# Patient Record
Sex: FEMALE | Race: WHITE | NOT HISPANIC OR LATINO | Employment: OTHER | URBAN - METROPOLITAN AREA
[De-identification: names, ages, dates, MRNs, and addresses within clinical notes are randomized per-mention and may not be internally consistent; named-entity substitution may affect disease eponyms.]

---

## 2017-01-01 LAB — PAP SMEAR - HIM PATIENT REPORTED: NEGATIVE

## 2017-01-26 ENCOUNTER — COMMUNICATION - HEALTHEAST (OUTPATIENT)
Dept: INTERNAL MEDICINE | Facility: CLINIC | Age: 62
End: 2017-01-26

## 2017-01-26 DIAGNOSIS — F41.1 GAD (GENERALIZED ANXIETY DISORDER): ICD-10-CM

## 2017-01-31 ENCOUNTER — COMMUNICATION - HEALTHEAST (OUTPATIENT)
Dept: INTERNAL MEDICINE | Facility: CLINIC | Age: 62
End: 2017-01-31

## 2017-01-31 DIAGNOSIS — F41.1 GAD (GENERALIZED ANXIETY DISORDER): ICD-10-CM

## 2017-04-03 ENCOUNTER — OFFICE VISIT (OUTPATIENT)
Dept: FAMILY MEDICINE | Facility: CLINIC | Age: 62
End: 2017-04-03
Payer: COMMERCIAL

## 2017-04-03 VITALS
RESPIRATION RATE: 16 BRPM | OXYGEN SATURATION: 100 % | DIASTOLIC BLOOD PRESSURE: 77 MMHG | WEIGHT: 143.2 LBS | BODY MASS INDEX: 23.83 KG/M2 | SYSTOLIC BLOOD PRESSURE: 125 MMHG | TEMPERATURE: 97.5 F | HEART RATE: 76 BPM

## 2017-04-03 DIAGNOSIS — R21 RASH: Primary | ICD-10-CM

## 2017-04-03 PROCEDURE — 99212 OFFICE O/P EST SF 10 MIN: CPT | Performed by: FAMILY MEDICINE

## 2017-04-03 ASSESSMENT — ENCOUNTER SYMPTOMS: FEVER: 0

## 2017-04-03 NOTE — NURSING NOTE
"Chief Complaint   Patient presents with     Derm Problem     rash on torso       Initial /77 (BP Location: Right arm, Patient Position: Chair, Cuff Size: Adult Regular)  Pulse 76  Temp 97.5  F (36.4  C) (Oral)  Resp 16  Wt 143 lb 3.2 oz (65 kg)  SpO2 100%  BMI 23.83 kg/m2 Estimated body mass index is 23.83 kg/(m^2) as calculated from the following:    Height as of 12/13/16: 5' 5\" (1.651 m).    Weight as of this encounter: 143 lb 3.2 oz (65 kg).  Medication Reconciliation: complete     Hoda Dang MA      "

## 2017-04-03 NOTE — PROGRESS NOTES
HPI CC:  61 yo F presents with a rash.  Rash      Duration: x couple days    Description  Location: torso  Itching: mild    Intensity:  mild    Accompanying signs and symptoms: a little sore and itchy    History (similar episodes/previous evaluation): history of eczema    Precipitating or alleviating factors:  New exposures:  None  Recent travel: no      Therapies tried and outcome: steroid cream - not sure (only used once last night)     Worried about potential shingles.  Did not have the vaccination.  No fever.  It looks like eczema she's had in the past, but on the left flank, there seemed to be a bump that might be a blister.  No new soaps, detergents, lotions, etc.  New clothes, perhaps.      Review of Systems   Constitutional: Negative for fever.   Skin: Positive for itching and rash.       Allergies   Allergen Reactions     Azithromycin Dihydrate Itching     Current Outpatient Prescriptions   Medication     LORAZEPAM PO     albuterol (PROAIR HFA/PROVENTIL HFA/VENTOLIN HFA) 108 (90 BASE) MCG/ACT Inhaler     VIVELLE TD     XOPENEX HFA 45 MCG/ACT IN AERO     ROBITUSSIN A-C  MG/5ML OR SYRP     No current facility-administered medications for this visit.      Active Ambulatory Problems     Diagnosis Date Noted     Acute pain of right knee 06/23/2016     SHAMA (generalized anxiety disorder) 12/13/2016     Resolved Ambulatory Problems     Diagnosis Date Noted     No Resolved Ambulatory Problems     Past Medical History:   Diagnosis Date     SHAMA (generalized anxiety disorder) 12/13/2016         Physical Exam   Constitutional: She is well-developed, well-nourished, and in no distress. No distress.   Skin: Skin is warm and dry. She is not diaphoretic.   Diffuse patch of slightly erythematous tiny papules to the left anterior flank and to the right abdomen.  There is a larger papule to the rash on the right, but it looks more like a flesh-toned nevus or skin tag rather than a vesicle.     Vitals reviewed.     BP  125/77 (BP Location: Right arm, Patient Position: Chair, Cuff Size: Adult Regular)  Pulse 76  Temp 97.5  F (36.4  C) (Oral)  Resp 16  Wt 143 lb 3.2 oz (65 kg)  SpO2 100%  BMI 23.83 kg/m2    A/P  Rash: bilateral, no vesicles, so unlikely to be shingles, though I advised she take this opportunity to be vaccinated (recommended to check at pharmacy, as she was concerned with cost).  Most likely eczema or perhaps contact dermatitis, recommended to use her steroid cream (which she gets from [employer] so isn't on our med list, and she wasn't sure which one she has) BID, to check back in with us if it is spreading or not responding to treatment.

## 2017-04-03 NOTE — MR AVS SNAPSHOT
"              After Visit Summary   4/3/2017    Maryann Atkinson    MRN: [id number]           Patient Information     Date Of Birth          1955        Visit Information        Provider Department      4/3/2017 9:00 AM Ban Skaggs MD [facility]        Today's Diagnoses     Rash    -  1       Follow-ups after your visit        Who to contact     If you have questions or need follow up information about today's clinic visit or your schedule please contact [facility] directly at [phone].  Normal or non-critical lab and imaging results will be communicated to you by [employer] Inchart, letter or phone within 4 business days after the clinic has received the results. If you do not hear from us within 7 days, please contact the clinic through [facility] or phone. If you have a critical or abnormal lab result, we will notify you by phone as soon as possible.  Submit refill requests through [email or web address] or call your pharmacy and they will forward the refill request to us. Please allow 3 business days for your refill to be completed.          Additional Information About Your Visit        MyChart Information     [email or web address] lets you send messages to your doctor, view your test results, renew your prescriptions, schedule appointments and more. To sign up, go to [email or web address] . Click on \"Log in\" on the left side of the screen, which will take you to the Welcome page. Then click on \"Sign up Now\" on the right side of the page.     You will be asked to enter the access code listed below, as well as some personal information. Please follow the directions to create your username and password.     Your access code is: [email or web address]  Expires: 2017  9:35 AM     Your access code will  in 90 days. If you need help or a new code, please call your [facility] or [phone].        Care EveryWhere ID     This is your Care EveryWhere ID. This could be used by other organizations " to access your [place] medical records  [id number]        Your Vitals Were     Pulse Temperature Respirations Pulse Oximetry BMI (Body Mass Index)       76 97.5  F (36.4  C) (Oral) 16 100% 23.83 kg/m2        Blood Pressure from Last 3 Encounters:   04/03/17 125/77   12/13/16 151/77   05/29/14 120/78    Weight from Last 3 Encounters:   04/03/17 143 lb 3.2 oz (65 kg)   12/13/16 143 lb (64.9 kg)   05/29/14 140 lb 9.6 oz (63.8 kg)              Today, you had the following     No orders found for display       Primary Care Provider    None Specified       No primary provider on file.        Thank you!     Thank you for choosing [facility]  for your care. Our goal is always to provide you with excellent care. Hearing back from our patients is one way we can continue to improve our services. Please take a few minutes to complete the written survey that you may receive in the mail after your visit with us. Thank you!             Your Updated Medication List - Protect others around you: Learn how to safely use, store and throw away your medicines at www.disposemymeds.org.          This list is accurate as of: 4/3/17  9:35 AM.  Always use your most recent med list.                   Brand Name Dispense Instructions for use    albuterol 108 (90 BASE) MCG/ACT Inhaler    PROAIR HFA/PROVENTIL HFA/VENTOLIN HFA    1 Inhaler    Inhale 2 puffs into the lungs every 6 hours as needed for shortness of breath / dyspnea or wheezing       LORAZEPAM PO          ROBITUSSIN A-C  MG/5ML Syrp   Generic drug:  CODEINE-GUAIFENESIN     4 OZ    ONE TO TWO TEASPOONS EVER 4 HOURS, AS NEEDED FOR COUGH       VIVELLE TD      None Entered       XOPENEX HFA 45 MCG/ACT Inhaler   Generic drug:  levalbuterol     1    1-2 puffs q 4-6 hours prn shortness of breath/cough

## 2017-08-02 ENCOUNTER — OFFICE VISIT - HEALTHEAST (OUTPATIENT)
Dept: INTERNAL MEDICINE | Facility: CLINIC | Age: 62
End: 2017-08-02

## 2017-08-02 DIAGNOSIS — K58.9 IRRITABLE BOWEL: ICD-10-CM

## 2017-08-02 ASSESSMENT — MIFFLIN-ST. JEOR: SCORE: 1162.51

## 2017-08-28 ENCOUNTER — COMMUNICATION - HEALTHEAST (OUTPATIENT)
Dept: INTERNAL MEDICINE | Facility: CLINIC | Age: 62
End: 2017-08-28

## 2017-08-29 ENCOUNTER — AMBULATORY - HEALTHEAST (OUTPATIENT)
Dept: INTERNAL MEDICINE | Facility: CLINIC | Age: 62
End: 2017-08-29

## 2017-08-29 DIAGNOSIS — R19.7 DIARRHEA: ICD-10-CM

## 2017-09-25 ENCOUNTER — RECORDS - HEALTHEAST (OUTPATIENT)
Dept: ADMINISTRATIVE | Facility: OTHER | Age: 62
End: 2017-09-25

## 2017-12-13 DIAGNOSIS — R00.2 PALPITATIONS: Primary | ICD-10-CM

## 2018-02-06 ENCOUNTER — COMMUNICATION - HEALTHEAST (OUTPATIENT)
Dept: INTERNAL MEDICINE | Facility: CLINIC | Age: 63
End: 2018-02-06

## 2018-02-06 DIAGNOSIS — F41.1 GAD (GENERALIZED ANXIETY DISORDER): ICD-10-CM

## 2018-02-08 ENCOUNTER — CONSULT (OUTPATIENT)
Dept: CARDIOLOGY | Facility: CLINIC | Age: 63
End: 2018-02-08

## 2018-02-08 VITALS
RESPIRATION RATE: 16 BRPM | OXYGEN SATURATION: 96 % | BODY MASS INDEX: 20.35 KG/M2 | DIASTOLIC BLOOD PRESSURE: 62 MMHG | HEART RATE: 44 BPM | WEIGHT: 119.2 LBS | SYSTOLIC BLOOD PRESSURE: 120 MMHG | HEIGHT: 64 IN

## 2018-02-08 DIAGNOSIS — R00.2 PALPITATIONS: ICD-10-CM

## 2018-02-08 DIAGNOSIS — R01.1 MURMUR, CARDIAC: ICD-10-CM

## 2018-02-08 DIAGNOSIS — R06.02 SHORTNESS OF BREATH: ICD-10-CM

## 2018-02-08 DIAGNOSIS — R94.31 ABNORMAL ECG: ICD-10-CM

## 2018-02-08 DIAGNOSIS — R55 SYNCOPE, UNSPECIFIED SYNCOPE TYPE: ICD-10-CM

## 2018-02-08 DIAGNOSIS — R07.9 CHEST PAIN IN ADULT: Primary | ICD-10-CM

## 2018-02-08 PROCEDURE — 99204 OFFICE O/P NEW MOD 45 MIN: CPT | Performed by: INTERNAL MEDICINE

## 2018-02-08 PROCEDURE — 93000 ELECTROCARDIOGRAM COMPLETE: CPT | Performed by: INTERNAL MEDICINE

## 2018-02-08 RX ORDER — METHENAMINE HIPPURATE 1000 MG/1
TABLET ORAL 2 TIMES DAILY
Refills: 11 | COMMUNITY
Start: 2017-11-09 | End: 2019-01-16

## 2018-02-08 NOTE — PROGRESS NOTES
"    Subjective:     Encounter Date:02/08/2018      Patient ID: Kiesha Dempsey is a 62 y.o. female.    Chief Complaint:Chest pain, shortness of breath and palpitations + syncope  HPI  This is a 62-year-old female patient with no prior history of documented heart disease who presents to cardiology clinic with a history of palpitations since the spring of 2017.  The patient had an episode of severe palpitations during that time but did not seek medical attention.  She had 2 more episodes prior to Thanksgiving.  She recently had a severe episode while at [employer].  The patient has had one syncopal episode and 1 presyncopal episode associated with these palpitations.  The patient reports having irregular heartbeat with dizziness and lightheadedness.  She will occasionally feel a hot flash sensation.  There is no associated nausea.  Recent indicates that if she waits a while and takes 1-2 aspirins the symptoms go away.  They generally last anywhere from 20 minutes to an hour.  She has no history of documented arrhythmia.  She does have a history of migraine headaches.  The patient also began having chest discomfort in the spring of 2017.  She describes this as a central sternal pressure sensation or a squeezing pain.  It has a tightness quality.  The discomfort does not radiate.  It generally has occurred approximately 6 times since the spring of 2017.  2 episodes awaken her from sleep.  One episode occurred while she was walking up a hill and one episode occurred while she was \"rushing\".  The discomfort is worse with physical activity and is relieved with rest.  It is associated with nausea and lightheadedness shortness of breath and dizziness.  She was experiencing chest tightness prior to passing out on one occasion.  The discomfort has approximately 5/10 in intensity.  She has no personal history of myocardial infarction or coronary revascularization.  She has never had a stress test.  She has no personal history of " hypertension diabetes or hypercholesterolemia.  Her family history is strongly positive for premature coronary disease.  She is a lifelong nonsmoker.  The patient also reports having shortness of breath which occurs primarily in association with her chest discomfort and palpitations.  She has also noticed some shortness of breath with activity.  There is no orthopnea or PND.  She has had occasional swelling of her feet and ankles.  The following portions of the patient's history were reviewed and updated as appropriate: allergies, current medications, past family history, past medical history, past social history, past surgical history and problem  Review of Systems   Constitution: Negative for chills, diaphoresis, fever, weakness, malaise/fatigue, night sweats, weight gain and weight loss.   HENT: Negative for ear discharge, hearing loss, hoarse voice and nosebleeds.    Eyes: Negative for discharge, double vision, pain and photophobia.   Cardiovascular: Positive for chest pain, dyspnea on exertion, irregular heartbeat, leg swelling, near-syncope, palpitations and syncope. Negative for claudication, cyanosis, orthopnea and paroxysmal nocturnal dyspnea.   Respiratory: Positive for shortness of breath. Negative for cough, hemoptysis, sputum production and wheezing.    Endocrine: Negative for cold intolerance, heat intolerance, polydipsia, polyphagia and polyuria.   Hematologic/Lymphatic: Negative for adenopathy and bleeding problem. Does not bruise/bleed easily.   Skin: Negative for color change, flushing, itching and rash.   Musculoskeletal: Negative for muscle cramps, muscle weakness, myalgias and stiffness.   Gastrointestinal: Negative for abdominal pain, diarrhea, hematemesis, hematochezia, nausea and vomiting.   Genitourinary: Negative for dysuria, frequency and nocturia.   Neurological: Positive for dizziness. Negative for focal weakness, loss of balance, numbness, paresthesias and seizures.  "  Psychiatric/Behavioral: Negative for altered mental status, hallucinations and suicidal ideas.   Allergic/Immunologic: Negative for HIV exposure, hives and persistent infections.       Current Outpatient Prescriptions:   •  methenamine (HIPREX) 1 g tablet, 2 (Two) Times a Day., Disp: , Rfl: 11  •  SUMATRIPTAN SUCCINATE PO, Take  by mouth As Needed., Disp: , Rfl:   •  TOPIRAMATE PO, Take 75 mg by mouth Daily., Disp: , Rfl:      Objective:     Physical Exam   Constitutional: She is oriented to person, place, and time. She appears well-developed and well-nourished.   HENT:   Head: Normocephalic and atraumatic.   Mouth/Throat: Oropharynx is clear and moist.   Eyes: Conjunctivae and EOM are normal. Pupils are equal, round, and reactive to light. No scleral icterus.   Neck: Normal range of motion. Neck supple. No JVD present. No tracheal deviation present. No thyromegaly present.   Cardiovascular: Normal rate, regular rhythm, S1 normal, S2 normal, normal heart sounds, intact distal pulses and normal pulses.  PMI is not displaced.  Exam reveals no gallop and no friction rub.    No murmur heard.  Pulmonary/Chest: Effort normal and breath sounds normal. No respiratory distress. She has no wheezes. She has no rales.   Abdominal: Soft. Bowel sounds are normal. She exhibits no distension and no mass. There is no tenderness. There is no rebound and no guarding.   Musculoskeletal: Normal range of motion. She exhibits no edema or deformity.   Neurological: She is alert and oriented to person, place, and time. She displays normal reflexes. No cranial nerve deficit. Coordination normal.   Skin: Skin is warm and dry. No rash noted. No erythema.   Psychiatric: She has a normal mood and affect. Her behavior is normal. Thought content normal.     Blood pressure 120/62, pulse (!) 44, resp. rate 16, height 162.6 cm (64\"), weight 54.1 kg (119 lb 3.2 oz), SpO2 96 %.   Lab Review:       Assessment:         1. Chest pain in adult  The " patient's chest discomfort has features both typical and atypical for coronary insufficiency.  She has multiple risk factors for coronary artery disease.  She has never had an ischemic evaluation.  - ECG 12 Lead  - Stress Test With Myocardial Perfusion (1 Day)  - Adult Transthoracic Echo Complete W/ Cont if Necessary Per Protocol    2. Shortness of breath  I suspect this is multifactorial in etiology.  There may be an element of unrecognized congestive heart failure.  There may be an element of valvular heart disease.  This could also represent an angina equivalent.  - ECG 12 Lead  - Stress Test With Myocardial Perfusion (1 Day)  - Adult Transthoracic Echo Complete W/ Cont if Necessary Per Protocol    3. Palpitations  Some of the patient's symptoms could be due to underlying arrhythmia and/or ectopy.  - ECG 12 Lead  - Adult Transthoracic Echo Complete W/ Cont if Necessary Per Protocol  - Mobile Cardiac Outpatient Telemetry    4. Syncope, unspecified syncope type  The clinical description does not seem suspicious for a vagal episode.  This seems to be more related to an underlying rhythm disturbance.  - Adult Transthoracic Echo Complete W/ Cont if Necessary Per Protocol  - Mobile Cardiac Outpatient Telemetry    5. Abnormal ECG  The patient is significantly bradycardic at today's visit.  This does not appear to be a sinus mechanism.  - Stress Test With Myocardial Perfusion (1 Day)  - Adult Transthoracic Echo Complete W/ Cont if Necessary Per Protocol    6. Murmur, cardiac  I did not appreciate a murmur at auscultation of the heart during today's cardiac evaluation.    ECG 12 Lead  Date/Time: 2/8/2018 3:57 PM  Performed by: GUERDA TANG  Authorized by: GUERDA TANG   Rate: normal  QRS axis: normal  Clinical impression: abnormal ECG  Comments: Ectopic atrial rhythm with bradycardia.  Decrease voltage in the right precordial leads.  Nonspecific T-wave changes.             Plan:       I have recommended a  vasodilator nuclear stress test as well as an echocardiogram.  I've also recommended a 30 day Mobile cardiac outpatient telemetry monitor.  This form of heart monitoring offers the best hope of diagnosing an arrhythmia as a cause of her syncope.  Given the relatively infrequent nature of her symptoms Holter monitor testing would be inappropriate.  No changes to her medication therapy a been made at today's visit.  Further recommendations will be predicated on the results of her outpatient testing.

## 2018-02-15 LAB
BH CV ECHO MEAS - % IVS THICK: 47.4 %
BH CV ECHO MEAS - % LVPW THICK: 25 %
BH CV ECHO MEAS - AO MAX PG (FULL): 1.3 MMHG
BH CV ECHO MEAS - AO MAX PG: 4 MMHG
BH CV ECHO MEAS - AO MEAN PG (FULL): 1 MMHG
BH CV ECHO MEAS - AO MEAN PG: 2 MMHG
BH CV ECHO MEAS - AO ROOT AREA (BSA CORRECTED): 1.7
BH CV ECHO MEAS - AO ROOT AREA: 5.3 CM^2
BH CV ECHO MEAS - AO ROOT DIAM: 2.6 CM
BH CV ECHO MEAS - AO V2 MAX: 100.1 CM/SEC
BH CV ECHO MEAS - AO V2 MEAN: 70.1 CM/SEC
BH CV ECHO MEAS - AO V2 VTI: 21.2 CM
BH CV ECHO MEAS - AVA(I,A): 2.8 CM^2
BH CV ECHO MEAS - AVA(I,D): 2.8 CM^2
BH CV ECHO MEAS - AVA(V,A): 2.8 CM^2
BH CV ECHO MEAS - AVA(V,D): 2.8 CM^2
BH CV ECHO MEAS - BSA(HAYCOCK): 1.6 M^2
BH CV ECHO MEAS - BSA: 1.6 M^2
BH CV ECHO MEAS - BZI_BMI: 20.4 KILOGRAMS/M^2
BH CV ECHO MEAS - BZI_METRIC_HEIGHT: 162.6 CM
BH CV ECHO MEAS - BZI_METRIC_WEIGHT: 54 KG
BH CV ECHO MEAS - CONTRAST EF 4CH: 77.5 ML/M^2
BH CV ECHO MEAS - EDV(CUBED): 103.8 ML
BH CV ECHO MEAS - EDV(MOD-SP4): 102 ML
BH CV ECHO MEAS - EDV(TEICH): 102.4 ML
BH CV ECHO MEAS - EF(CUBED): 80 %
BH CV ECHO MEAS - EF(MOD-SP4): 77.5 %
BH CV ECHO MEAS - EF(TEICH): 72.4 %
BH CV ECHO MEAS - ESV(CUBED): 20.8 ML
BH CV ECHO MEAS - ESV(MOD-SP4): 23 ML
BH CV ECHO MEAS - ESV(TEICH): 28.3 ML
BH CV ECHO MEAS - FS: 41.5 %
BH CV ECHO MEAS - IVS/LVPW: 1.2
BH CV ECHO MEAS - IVSD: 0.95 CM
BH CV ECHO MEAS - IVSS: 1.4 CM
BH CV ECHO MEAS - LA DIMENSION: 3.3 CM
BH CV ECHO MEAS - LA/AO: 1.3
BH CV ECHO MEAS - LV DIASTOLIC VOL/BSA (35-75): 65 ML/M^2
BH CV ECHO MEAS - LV IVRT: 0.18 SEC
BH CV ECHO MEAS - LV MASS(C)D: 137.5 GRAMS
BH CV ECHO MEAS - LV MASS(C)DI: 87.6 GRAMS/M^2
BH CV ECHO MEAS - LV MASS(C)S: 96.9 GRAMS
BH CV ECHO MEAS - LV MASS(C)SI: 61.8 GRAMS/M^2
BH CV ECHO MEAS - LV MAX PG: 2.7 MMHG
BH CV ECHO MEAS - LV MEAN PG: 1 MMHG
BH CV ECHO MEAS - LV SYSTOLIC VOL/BSA (12-30): 14.7 ML/M^2
BH CV ECHO MEAS - LV V1 MAX: 81.8 CM/SEC
BH CV ECHO MEAS - LV V1 MEAN: 51.6 CM/SEC
BH CV ECHO MEAS - LV V1 VTI: 17.4 CM
BH CV ECHO MEAS - LVIDD: 4.7 CM
BH CV ECHO MEAS - LVIDS: 2.8 CM
BH CV ECHO MEAS - LVLD AP4: 7.3 CM
BH CV ECHO MEAS - LVLS AP4: 5.9 CM
BH CV ECHO MEAS - LVOT AREA (M): 3.5 CM^2
BH CV ECHO MEAS - LVOT AREA: 3.5 CM^2
BH CV ECHO MEAS - LVOT DIAM: 2.1 CM
BH CV ECHO MEAS - LVPWD: 0.8 CM
BH CV ECHO MEAS - LVPWS: 1 CM
BH CV ECHO MEAS - MV A MAX VEL: 39.2 CM/SEC
BH CV ECHO MEAS - MV DEC SLOPE: 215.5 CM/SEC^2
BH CV ECHO MEAS - MV DEC TIME: 0.27 SEC
BH CV ECHO MEAS - MV E MAX VEL: 51.6 CM/SEC
BH CV ECHO MEAS - MV E/A: 1.3
BH CV ECHO MEAS - MV MAX PG: 1.2 MMHG
BH CV ECHO MEAS - MV MEAN PG: 1 MMHG
BH CV ECHO MEAS - MV P1/2T MAX VEL: 52.3 CM/SEC
BH CV ECHO MEAS - MV P1/2T: 71.1 MSEC
BH CV ECHO MEAS - MV V2 MAX: 55.1 CM/SEC
BH CV ECHO MEAS - MV V2 MEAN: 39.5 CM/SEC
BH CV ECHO MEAS - MV V2 VTI: 26.1 CM
BH CV ECHO MEAS - MVA P1/2T LCG: 4.2 CM^2
BH CV ECHO MEAS - MVA(P1/2T): 3.1 CM^2
BH CV ECHO MEAS - MVA(VTI): 2.3 CM^2
BH CV ECHO MEAS - PA MAX PG: 0.95 MMHG
BH CV ECHO MEAS - PA V2 MAX: 34.4 CM/SEC
BH CV ECHO MEAS - RAP SYSTOLE: 10 MMHG
BH CV ECHO MEAS - RVSP: 35 MMHG
BH CV ECHO MEAS - SI(AO): 71.8 ML/M^2
BH CV ECHO MEAS - SI(CUBED): 52.9 ML/M^2
BH CV ECHO MEAS - SI(LVOT): 38.4 ML/M^2
BH CV ECHO MEAS - SI(MOD-SP4): 50.4 ML/M^2
BH CV ECHO MEAS - SI(TEICH): 47.2 ML/M^2
BH CV ECHO MEAS - SV(AO): 112.6 ML
BH CV ECHO MEAS - SV(CUBED): 83 ML
BH CV ECHO MEAS - SV(LVOT): 60.3 ML
BH CV ECHO MEAS - SV(MOD-SP4): 79 ML
BH CV ECHO MEAS - SV(TEICH): 74.1 ML
BH CV ECHO MEAS - TR MAX VEL: 201.3 CM/SEC
BH CV ECHO MEAS - TV MAX PG: 0.94 MMHG
BH CV ECHO MEAS - TV V2 MAX: 48.5 CM/SEC
LV EF 2D ECHO EST: 60 %

## 2018-03-02 ENCOUNTER — TELEPHONE (OUTPATIENT)
Dept: CARDIOLOGY | Facility: CLINIC | Age: 63
End: 2018-03-02

## 2018-03-02 NOTE — TELEPHONE ENCOUNTER
Patient called about using the [facility] monitor. She spoke to Susana first talked with the her for about 5 mins. Then I picked up the call due to another patient checking in for their appointment.  I was on the call for 5-8 mins. listening to the patient explain what she was feeling with the monitor. She describes it as a vibration in her muscle on the right side under the white patch. She said that [employer] had replaced the monitor due to this feeling but it has continued with the new monitor. She started feeling this way on Sunday or Monday this week. She states that she said she had not been as active lately due to the weather but has been calm and restful. She states that when she takes the monitor off, she still feels this sensation. It is the same when she moved the patch to another area on her chest. She states it reminds her of a Tends unit where it tights & relax's the muscle. I told that maybe this was a muscle spasm. I told her she could wear it as tolerated. But, suggested that she may try to some activity more like she normally would do and see if that helps. I used an example to explain. ( It may be like when some patients do not notice heart palpitations until they lay down at night because they are busy all through the day.)   She then says to me that I am not listening to her and I am calling her psychotic. I tried to tell her that is not what I am saying. But, she refused to listen and said No one was listening to her and she said I just called for advise. She would not listen and hung up.  Maryam Petty MA

## 2018-03-05 ENCOUNTER — TELEPHONE (OUTPATIENT)
Dept: CARDIOLOGY | Facility: CLINIC | Age: 63
End: 2018-03-05

## 2018-03-05 DIAGNOSIS — R55 SYNCOPE, UNSPECIFIED SYNCOPE TYPE: ICD-10-CM

## 2018-03-05 DIAGNOSIS — R07.9 CHEST PAIN, UNSPECIFIED TYPE: ICD-10-CM

## 2018-03-05 DIAGNOSIS — R06.02 SOB (SHORTNESS OF BREATH): ICD-10-CM

## 2018-03-05 DIAGNOSIS — R00.2 PALPITATION: Primary | ICD-10-CM

## 2018-03-05 NOTE — TELEPHONE ENCOUNTER
Discontinue heart monitor. We will refer her to electrophysiologist in [facility] for potential alternative testing.

## 2018-03-05 NOTE — TELEPHONE ENCOUNTER
Patient called stating that she is still feeling vibrations in her muscle on the right side under the white patch.  [employer] has sent her a new monitor and it did not help. See telephone note from 03/02. Please advise.

## 2018-03-19 LAB
Lab: 10
TOAL ENROLLMENT DAYS: 22

## 2018-04-09 ENCOUNTER — HOSPITAL ENCOUNTER (OUTPATIENT)
Dept: NUCLEAR MEDICINE | Facility: HOSPITAL | Age: 63
Discharge: HOME | End: 2018-04-09
Attending: INTERNAL MEDICINE

## 2018-04-09 PROCEDURE — 93018 CV STRESS TEST I&R ONLY: CPT | Performed by: INTERNAL MEDICINE

## 2018-04-09 PROCEDURE — 78452 HT MUSCLE IMAGE SPECT MULT: CPT

## 2018-04-09 PROCEDURE — 25010000002 REGADENOSON 0.4 MG/5ML SOLUTION: Performed by: INTERNAL MEDICINE

## 2018-04-09 PROCEDURE — A9500 TC99M SESTAMIBI: HCPCS | Performed by: INTERNAL MEDICINE

## 2018-04-09 PROCEDURE — 0 TECHNETIUM SESTAMIBI: Performed by: INTERNAL MEDICINE

## 2018-04-09 PROCEDURE — 78452 HT MUSCLE IMAGE SPECT MULT: CPT | Performed by: INTERNAL MEDICINE

## 2018-04-09 PROCEDURE — 93017 CV STRESS TEST TRACING ONLY: CPT

## 2018-04-09 RX ADMIN — TECHNETIUM TC 99M SESTAMIBI 1 DOSE: 1 INJECTION INTRAVENOUS at 08:20

## 2018-04-09 RX ADMIN — TECHNETIUM TC 99M SESTAMIBI 1 DOSE: 1 INJECTION INTRAVENOUS at 10:05

## 2018-04-09 RX ADMIN — REGADENOSON 0.4 MG: 0.08 INJECTION, SOLUTION INTRAVENOUS at 10:05

## 2018-04-11 LAB
BH CV NUCLEAR PRIOR STUDY: 3
BH CV STRESS COMMENTS STAGE 1: NORMAL
BH CV STRESS DOSE REGADENOSON STAGE 1: 0.4
BH CV STRESS DURATION MIN STAGE 1: 0
BH CV STRESS DURATION SEC STAGE 1: 10
BH CV STRESS PROTOCOL 1: NORMAL
BH CV STRESS RECOVERY BP: NORMAL MMHG
BH CV STRESS RECOVERY HR: 78 BPM
BH CV STRESS STAGE 1: 1
LV EF NUC BP: 69 %
MAXIMAL PREDICTED HEART RATE: 155 BPM
PERCENT MAX PREDICTED HR: 56.77 %
STRESS BASELINE BP: NORMAL MMHG
STRESS BASELINE HR: 48 BPM
STRESS PERCENT HR: 67 %
STRESS POST PEAK BP: NORMAL MMHG
STRESS POST PEAK HR: 88 BPM
STRESS TARGET HR: 132 BPM

## 2018-04-17 ENCOUNTER — OFFICE VISIT (OUTPATIENT)
Dept: CARDIOLOGY | Facility: CLINIC | Age: 63
End: 2018-04-17

## 2018-04-17 VITALS
BODY MASS INDEX: 21 KG/M2 | HEART RATE: 55 BPM | OXYGEN SATURATION: 93 % | SYSTOLIC BLOOD PRESSURE: 110 MMHG | HEIGHT: 64 IN | WEIGHT: 123 LBS | DIASTOLIC BLOOD PRESSURE: 78 MMHG

## 2018-04-17 DIAGNOSIS — R07.2 PRECORDIAL PAIN: Primary | ICD-10-CM

## 2018-04-17 DIAGNOSIS — R00.2 PALPITATIONS: ICD-10-CM

## 2018-04-17 DIAGNOSIS — R06.02 SOB (SHORTNESS OF BREATH): ICD-10-CM

## 2018-04-17 DIAGNOSIS — I27.20 PULMONARY HYPERTENSION (HCC): ICD-10-CM

## 2018-04-17 PROCEDURE — 99214 OFFICE O/P EST MOD 30 MIN: CPT | Performed by: INTERNAL MEDICINE

## 2018-04-17 NOTE — PROGRESS NOTES
Subjective:     Encounter Date:04/17/2018      Patient ID: Kiesha Dempsey is a 63 y.o. female.    Chief Complaint:Shortness of breath  HPI  This is a 63-year-old female patient who presents to clinic for follow-up of multiple cardiac complaints.  The patient is primarily experiencing exertional dyspnea with exertional chest tightness and palpitations with activity.  She also indicates that she will have severe dizziness with a sense of presyncope when doing physical activities.  The patient underwent a vasodilator nuclear stress test which showed no evidence of ischemia or infarction.  The calculated ejection fraction was normal.  The patient underwent an echocardiogram which showed normal left ventricular systolic and diastolic function.  There was no valvular abnormalities or pericardial disease.  The ejection fraction was normal and there were no regional wall motion abnormalities.  The patient was demonstrated to have mild pulmonary hypertension at rest.  There is no evidence of intracardiac shunting.  The patient is a nonsmoker.  She has never smoked about the course of her lifetime and has no history of documented lung disease.  She has no history of DVT or pulmonary embolus.  She is low risk for obstructive sleep apnea.  She has no history of essential hypertension.  In fact her blood pressure has consistently been on the low side of normal.  Her cardiac monitor showed no evidence of arrhythmia.  There was no supraventricular or ventricular tachycardia.  There was no bradycardia, conduction disturbance, heart block or pauses greater than 2.0 seconds.  The following portions of the patient's history were reviewed and updated as appropriate: allergies, current medications, past family history, past medical history, past social history, past surgical history and problem  Review of Systems   Constitution: Negative for chills, diaphoresis, fever, weakness, malaise/fatigue, night sweats, weight gain and  weight loss.   HENT: Negative for ear discharge, hearing loss, hoarse voice and nosebleeds.    Eyes: Negative for discharge, double vision, pain and photophobia.   Cardiovascular: Positive for chest pain, dyspnea on exertion, near-syncope and palpitations. Negative for claudication, cyanosis, irregular heartbeat, leg swelling, orthopnea, paroxysmal nocturnal dyspnea and syncope.   Respiratory: Positive for shortness of breath. Negative for cough, hemoptysis, sputum production and wheezing.    Endocrine: Negative for cold intolerance, heat intolerance, polydipsia, polyphagia and polyuria.   Hematologic/Lymphatic: Negative for adenopathy and bleeding problem. Does not bruise/bleed easily.   Skin: Negative for color change, flushing, itching and rash.   Musculoskeletal: Negative for muscle cramps, muscle weakness, myalgias and stiffness.   Gastrointestinal: Negative for abdominal pain, diarrhea, hematemesis, hematochezia, nausea and vomiting.   Genitourinary: Negative for dysuria, frequency and nocturia.   Neurological: Positive for dizziness. Negative for focal weakness, loss of balance, numbness, paresthesias and seizures.   Psychiatric/Behavioral: Negative for altered mental status, hallucinations and suicidal ideas.   Allergic/Immunologic: Negative for HIV exposure, hives and persistent infections.           Current Outpatient Prescriptions:   •  methenamine (HIPREX) 1 g tablet, 2 (Two) Times a Day., Disp: , Rfl: 11  •  SUMATRIPTAN SUCCINATE PO, Take  by mouth As Needed., Disp: , Rfl:   •  TOPIRAMATE PO, Take 75 mg by mouth Daily., Disp: , Rfl:      Objective:     Physical Exam   Constitutional: She is oriented to person, place, and time. She appears well-developed and well-nourished.   HENT:   Head: Normocephalic and atraumatic.   Eyes: Conjunctivae are normal. No scleral icterus.   Neck: No JVD present.   Cardiovascular: Normal rate, regular rhythm, normal heart sounds and intact distal pulses.  Exam reveals no  "gallop and no friction rub.    No murmur heard.  Pulmonary/Chest: Effort normal and breath sounds normal. No respiratory distress.   Abdominal: Soft. Bowel sounds are normal. There is no tenderness.   Musculoskeletal: She exhibits no edema.   Neurological: She is alert and oriented to person, place, and time.   Skin: Skin is warm and dry. No rash noted.   Psychiatric: She has a normal mood and affect. Her behavior is normal.     Blood pressure 110/78, pulse 55, height 162.6 cm (64.02\"), weight 55.8 kg (123 lb), SpO2 93 %.   Lab Review:       Assessment:         1. Precordial pain  Noncardiac chest pain.  I suspect this is a symptom of pulmonary hypertension.  I suspect her pulmonary artery pressures increased with exercise.    2. Palpitations  No evidence of arrhythmia.  I suspect this is a manifestation of her pulmonary hypertension.    3. SOB (shortness of breath)  This appears to be due to pulmonary hypertension.  There is no cardiac etiology to her pulmonary hypertension.    4. Pulmonary hypertension  This may be primary pulmonary hypertension although the presentation is relatively late onset.  There may be an unrecognized underlying pulmonary etiology.    Procedures     Plan:       I have recommended that the patient first see a pulmonologist.  I have given her a \"heads up\" that she will probably undergo a complete set of pulmonary function studies with her and post bronchodilator spirometry as well as lung volumes and DlCO, she may end up having a high resolution CT scan of the chest and/or a VQ scan.  She may also require formal sleep study.  If the patient's pulmonary evaluation does not show a pulmonary etiology to her pulmonary hypertension she may require referral to the [facility] for formal evaluation for primary pulmonary hypertension.  No changes in her medication therapy have been made at today's visit.  Further recommendations will be predicated on " the results of her pulmonary evaluation.

## 2018-04-18 ENCOUNTER — TELEPHONE (OUTPATIENT)
Dept: CARDIOLOGY | Facility: CLINIC | Age: 63
End: 2018-04-18

## 2018-04-18 NOTE — TELEPHONE ENCOUNTER
Patient called stating that her appointment with  is not until 05/25.That's the soonest they had. I called  pulmonary in [place] and asked if they had any sooner appointments with anyone there. First available is in July. Notified patient and she wants to see if there is someone at  she can go to instead.

## 2018-04-18 NOTE — TELEPHONE ENCOUNTER
Yes but I don't know anyone specifically. I would just refer her to Dr. Rubin for Pulmonary Hypertension and he could pick the pulmonologist that he trusts the most.

## 2018-04-19 NOTE — TELEPHONE ENCOUNTER
office did not have anything until July now. Informed patient, she is just going to go to [facility].

## 2018-04-19 NOTE — TELEPHONE ENCOUNTER
booked out until August, and  (another provider in office) did not have anything until 05-29. Did not make appointment since patient is scheduled with . After talking to patient, I will call Dr. Rubin's office back and see what they have available.

## 2018-04-27 ENCOUNTER — TELEPHONE (OUTPATIENT)
Dept: CARDIOLOGY | Facility: CLINIC | Age: 63
End: 2018-04-27

## 2018-04-27 NOTE — TELEPHONE ENCOUNTER
FYI    Patient had left a vm asking what to do for her irregular hr and shortness of breath while she is waiting to go to the pulmonologist.  Lvm on patient's phone that if the issues get worse before her appointment with the pulmonologist, she can call this office and if we are not available she should go to ED.

## 2018-05-21 ENCOUNTER — COMMUNICATION - HEALTHEAST (OUTPATIENT)
Dept: INTERNAL MEDICINE | Facility: CLINIC | Age: 63
End: 2018-05-21

## 2018-08-28 ENCOUNTER — COMMUNICATION - HEALTHEAST (OUTPATIENT)
Dept: INTERNAL MEDICINE | Facility: CLINIC | Age: 63
End: 2018-08-28

## 2018-10-02 ENCOUNTER — OFFICE VISIT - HEALTHEAST (OUTPATIENT)
Dept: INTERNAL MEDICINE | Facility: CLINIC | Age: 63
End: 2018-10-02

## 2018-10-02 DIAGNOSIS — M26.609 TMJ (TEMPOROMANDIBULAR JOINT SYNDROME): ICD-10-CM

## 2018-10-02 ASSESSMENT — MIFFLIN-ST. JEOR: SCORE: 1167.05

## 2018-12-13 ENCOUNTER — OFFICE VISIT (OUTPATIENT)
Dept: CARDIOLOGY | Facility: CLINIC | Age: 63
End: 2018-12-13

## 2018-12-13 VITALS
HEART RATE: 57 BPM | SYSTOLIC BLOOD PRESSURE: 110 MMHG | OXYGEN SATURATION: 99 % | BODY MASS INDEX: 20.14 KG/M2 | WEIGHT: 118 LBS | DIASTOLIC BLOOD PRESSURE: 62 MMHG | HEIGHT: 64 IN

## 2018-12-13 DIAGNOSIS — I48.0 PAROXYSMAL ATRIAL FIBRILLATION (HCC): Primary | ICD-10-CM

## 2018-12-13 DIAGNOSIS — R00.2 PALPITATIONS: ICD-10-CM

## 2018-12-13 PROCEDURE — 99214 OFFICE O/P EST MOD 30 MIN: CPT | Performed by: INTERNAL MEDICINE

## 2018-12-13 RX ORDER — SUMATRIPTAN 100 %
POWDER (GRAM) MISCELLANEOUS
COMMUNITY
End: 2019-01-16

## 2018-12-13 RX ORDER — DIGOXIN 250 MCG
250 TABLET ORAL
Qty: 90 TABLET | Refills: 4 | Status: SHIPPED | OUTPATIENT
Start: 2018-12-13 | End: 2019-04-25

## 2018-12-13 RX ORDER — SUMATRIPTAN 100 MG/1
TABLET, FILM COATED ORAL
Refills: 1 | COMMUNITY
Start: 2018-09-13 | End: 2019-01-16

## 2018-12-13 RX ORDER — TOPIRAMATE 100 MG/1
CAPSULE, EXTENDED RELEASE ORAL
Refills: 0 | COMMUNITY
Start: 2018-10-08 | End: 2019-04-25

## 2018-12-13 RX ORDER — FLECAINIDE ACETATE 50 MG/1
50 TABLET ORAL 2 TIMES DAILY
Qty: 180 TABLET | Refills: 4 | Status: SHIPPED | OUTPATIENT
Start: 2018-12-13 | End: 2019-01-16

## 2018-12-13 NOTE — PROGRESS NOTES
Subjective:     Encounter Date:12/13/2018      Patient ID: Kiesha Dempsey is a 63 y.o. female.    Chief Complaint: Atrial fibrillation  HPI  This is a 63-year-old female patient who is been experiencing palpitations for years with no prior documentation of arrhythmia who was recently seen her primary care provider for a sore throat when she reported that she was experiencing severe palpitations.  A 12-lead electrocardiogram was performed which showed atrial fibrillation with rapid ventricular response.  This confirms that the patient does have underlying paroxysmal atrial fibrillation and probably accounts for her years of palpitations.  The patient was started on Eliquis was as well as metoprolol.  She is concerned about taken beta blockers due to her history of dizziness and tendency towards hypotension.  She has read that the potential side effects of metoprolol are low blood pressure and dizziness.  She indicates that she would prefer not to take any medications if possible.  We have received a fax from her primary care provider's office and confirmed that the 12-lead electrocardiogram did in fact show atrial fibrillation.  She also had thyroid function studies which were normal.  The patient has previously had an echocardiogram which was normal and a normal treadmill exercise stress test.  The patient has no chest discomfort at rest or with activity.  There is no exertional chest arm neck jaw shoulder or back discomfort.  There is no orthopnea PND or lower extremity edema.  There is no syncope.  She remains a nonsmoker.  The following portions of the patient's history were reviewed and updated as appropriate: allergies, current medications, past family history, past medical history, past social history, past surgical history and problem  Review of Systems   Constitution: Negative for chills, diaphoresis, fever, weakness, malaise/fatigue, weight gain and weight loss.   HENT: Negative for ear discharge,  hearing loss, hoarse voice and nosebleeds.    Eyes: Negative for discharge, double vision, pain and photophobia.   Cardiovascular: Positive for palpitations. Negative for chest pain, claudication, cyanosis, dyspnea on exertion, irregular heartbeat, leg swelling, near-syncope, orthopnea, paroxysmal nocturnal dyspnea and syncope.   Respiratory: Negative for cough, hemoptysis, shortness of breath, sputum production and wheezing.    Endocrine: Negative for cold intolerance, heat intolerance, polydipsia, polyphagia and polyuria.   Hematologic/Lymphatic: Negative for adenopathy and bleeding problem. Does not bruise/bleed easily.   Skin: Negative for color change, flushing, itching and rash.   Musculoskeletal: Negative for muscle cramps, muscle weakness, myalgias and stiffness.   Gastrointestinal: Negative for abdominal pain, diarrhea, hematemesis, hematochezia, nausea and vomiting.   Genitourinary: Negative for dysuria, frequency and nocturia.   Neurological: Negative for dizziness, focal weakness, light-headedness, loss of balance, numbness, paresthesias and seizures.   Psychiatric/Behavioral: Negative for altered mental status, hallucinations and suicidal ideas.   Allergic/Immunologic: Negative for HIV exposure, hives and persistent infections.       Current Outpatient Medications:   •  apixaban (ELIQUIS) 5 MG tablet tablet, Take 1 tablet by mouth Every 12 (Twelve) Hours., Disp: 60 tablet, Rfl: 11  •  digoxin (LANOXIN) 250 MCG tablet, Take 1 tablet by mouth Daily., Disp: 90 tablet, Rfl: 4  •  Estriol 10 % cream, Insert 1 mL into the vagina Daily As Needed., Disp: , Rfl: 5  •  flecainide (TAMBOCOR) 50 MG tablet, Take 1 tablet by mouth 2 (Two) Times a Day., Disp: 180 tablet, Rfl: 4  •  methenamine (HIPREX) 1 g tablet, 2 (Two) Times a Day., Disp: , Rfl: 11  •  SUMAtriptan (IMITREX) 100 MG tablet, Take one tablet at onset of headache. May repeat dose one time in 2 hours if headache not relieved., Disp: , Rfl: 1  •   "SUMAtriptan powder, Take  by mouth., Disp: , Rfl:   •  SUMATRIPTAN SUCCINATE PO, Take  by mouth As Needed., Disp: , Rfl:   •  TOPIRAMATE PO, Take 75 mg by mouth Daily., Disp: , Rfl:   •  TROKENDI  MG capsule sustained-release 24 hr, , Disp: , Rfl: 0     Objective:     Physical Exam   Constitutional: She is oriented to person, place, and time. She appears well-developed and well-nourished. No distress.   HENT:   Head: Normocephalic and atraumatic.   Mouth/Throat: Oropharynx is clear and moist.   Eyes: Conjunctivae and EOM are normal. Pupils are equal, round, and reactive to light. No scleral icterus.   Neck: Normal range of motion. Neck supple. No JVD present. No tracheal deviation present. No thyromegaly present.   Cardiovascular: Normal rate, regular rhythm, S1 normal, S2 normal, normal heart sounds, intact distal pulses and normal pulses. PMI is not displaced. Exam reveals no gallop and no friction rub.   No murmur heard.  Pulmonary/Chest: Effort normal and breath sounds normal. No stridor. No respiratory distress. She has no wheezes. She has no rales.   Abdominal: Soft. Bowel sounds are normal. She exhibits no distension and no mass. There is no tenderness. There is no rebound and no guarding.   Musculoskeletal: Normal range of motion. She exhibits no edema or deformity.   Lymphadenopathy:     She has no cervical adenopathy.   Neurological: She is alert and oriented to person, place, and time. She displays normal reflexes. No cranial nerve deficit. She exhibits normal muscle tone. Coordination normal.   Skin: Skin is warm and dry. No rash noted. She is not diaphoretic. No erythema.   Psychiatric: She has a normal mood and affect. Her behavior is normal. Thought content normal.     Blood pressure 110/62, pulse 57, height 162.6 cm (64.02\"), weight 53.5 kg (118 lb), SpO2 99 %.   Lab Review:       Assessment:         1. Paroxysmal atrial fibrillation (CMS/HCC)  Her chads 2 vascular score is 2.  Lifelong " anticoagulation therapy is indicated.    2. Palpitations  It is highly likely that her years of palpitations is due to paroxysmal of atrial fibrillation.    Procedures     Plan:       I have recommended discontinuation of Lopressor.  I have recommended starting digoxin 0.25 mg orally once per day as well as flecainide 50 mg orally twice per day.  The flecainide can be uptitrated as necessary for symptom control.  The pathophysiology of atrial fibrillation in an otherwise healthy patient has been explained.  It is likely that some of her atrial fibrillation is being triggered by her migraine medication.  She is instructed to follow-up with the individual treating her migraine headaches to see if there is alternative therapy.  I have discussed with the patient preliminarily the option of pulmonary vein radiofrequency ablation.  The procedure has been explained to the patient.  The success of the procedure has been explained to the patient and it has been reinforced that if she desires not to take medications this would be the ideal choice.  The patient and her  would like to think this option over before deciding to speak with an electrophysiologist.  The patient has been counseled regarding her symptoms and when to report to the emergency room.  The patient has been counseled that certain drug therapies are more effective than other drug therapies and it is a matter of trying different antiarrhythmic drugs to find a medication that is both effective and has tolerable side effects.  She has been reassured regarding the safety of flecainide in the absence of active ischemic heart disease or congestive heart failure.  No additional testing is indicated at this time.

## 2018-12-17 ENCOUNTER — TELEPHONE (OUTPATIENT)
Dept: CARDIOLOGY | Facility: CLINIC | Age: 63
End: 2018-12-17

## 2018-12-17 NOTE — TELEPHONE ENCOUNTER
Patient was seen on 12/13 and was prescribed Tambocor and Digoxin. Patient states she can not tolerate it.   Patient states she is having headaches and nausea that gets worse with every dose.

## 2019-01-05 ENCOUNTER — TRANSFERRED RECORDS (OUTPATIENT)
Dept: HEALTH INFORMATION MANAGEMENT | Facility: CLINIC | Age: 64
End: 2019-01-05

## 2019-01-16 ENCOUNTER — CONSULT (OUTPATIENT)
Dept: CARDIOLOGY | Facility: CLINIC | Age: 64
End: 2019-01-16

## 2019-01-16 VITALS
BODY MASS INDEX: 20.32 KG/M2 | WEIGHT: 119 LBS | HEIGHT: 64 IN | SYSTOLIC BLOOD PRESSURE: 114 MMHG | DIASTOLIC BLOOD PRESSURE: 70 MMHG | HEART RATE: 56 BPM

## 2019-01-16 DIAGNOSIS — I49.5 TACHY-BRADY SYNDROME (HCC): ICD-10-CM

## 2019-01-16 DIAGNOSIS — I48.0 PAROXYSMAL ATRIAL FIBRILLATION (HCC): Primary | ICD-10-CM

## 2019-01-16 PROCEDURE — 93000 ELECTROCARDIOGRAM COMPLETE: CPT | Performed by: INTERNAL MEDICINE

## 2019-01-16 PROCEDURE — 99204 OFFICE O/P NEW MOD 45 MIN: CPT | Performed by: INTERNAL MEDICINE

## 2019-01-16 RX ORDER — FLAVOXATE HYDROCHLORIDE 100 MG/1
100 TABLET ORAL 3 TIMES DAILY PRN
COMMUNITY
End: 2019-04-25

## 2019-01-16 RX ORDER — ZOLMITRIPTAN 5 MG/1
5 TABLET, FILM COATED ORAL ONCE AS NEEDED
COMMUNITY
End: 2023-01-11

## 2019-01-16 NOTE — PROGRESS NOTES
"Electrophysiology Consult     Kiesha Dempsey  1955  [unfilled]  [unfilled]    01/16/19    DATE OF ADMISSION: (Not on file)  [facility] CARDIOLOGY    Tyler Rucker MD  [address] / [place] [address]    Chief Complaint   Patient presents with   • Atrial Fibrillation     Problem List:    1. Paroxysmal Atrial Fibrillation  a. CHADSVasc = 2 on Eliquis  b. 24 Hour Holter 3/2018: no atrial fibrillation.   c. Diagnosed at PCP by EKG with symptoms of palpitations 11/15/18  d. Treated with metoprolol - caused low BP and low HR  e. Flecainide started 12/2018 - intolerant due to HA  f. Echocardiogram 2/15/18: EF 60%, mild TR  g. Stress Test 4/9/18: EF 69%, no ischemia. Low risk study.   2. Frequent UTIs  3. Migraines  4. Colon polyps  5. Chronic nausea  6. Surgical History  a. Hyterectomy  b. Total hip arthroplasty  c. Vein surgery    HPI:  63 year old WF who presents today in consultation referred by Dr. Mirza for atrial fibrillation and to discuss PVA. She has had palpitations for a couple of years, but only recently was diagnosed with atrial fibrillation after presenting to PCP office for sore throat and palpitations on 11/15/18.She was started on Metoprolol and Eliquis. Metoprolol caused her BP and HR to be low.  Since then, she has seen Dr Mirza. She had a stress test and echocardiogram which were normal back in February/April 2018. She has been put on Flecainide. She describes her symptoms while in atrial fibrillation as sudden onset of \"feeling strange\", irregular heart beat and tachycardia (160 bpm), SOB, exercise and heat intolerance, and presyncope with blurred vision, moderate in severity. She has fallen down with dizziness but no loss of consciousness. No CP or syncope. Episodes occur at irregular intervals, but usually about once every 3 months. Episodes last usually 30 minutes up to three hours. She was placed on Flecainide but was not able to tolerate it due to " "severe headaches. When in NSR, she feels okay but does have chronic nausea. She is still taking Digoxin and thinks she has side effects with it such as worsened nausea and also \"eyelid twitches\". Her last episode of atrial fibrillation was in November 2018. She denies tobacco. No ETOH. She drinks herbal tea, but no coffee or sodas. She denies testing for VICKY. She does snore. Her thyroid studies were normal in November 2018. She has baseline low BP.          Allergies   Allergen Reactions   • Bactrim [Sulfamethoxazole-Trimethoprim] Hives   • Tramadol Shortness Of Breath, Nausea Only and Other (See Comments)     CHEST PAIN   • Flecainide Other (See Comments)     Headaches          Cannot display prior to admission medications because the patient has not been admitted in this contact.            Current Outpatient Medications:   •  apixaban (ELIQUIS) 5 MG tablet tablet, Take 1 tablet by mouth Every 12 (Twelve) Hours., Disp: 60 tablet, Rfl: 11  •  BUTALBITAL-ACETAMINOPHEN PO, Take  by mouth Daily. With caffeine as well, Disp: , Rfl:   •  digoxin (LANOXIN) 250 MCG tablet, Take 1 tablet by mouth Daily. (Patient taking differently: Take 125 mcg by mouth 2 (Two) Times a Day.), Disp: 90 tablet, Rfl: 4  •  Estriol 10 % cream, Insert 1 mL into the vagina Daily As Needed., Disp: , Rfl: 5  •  flavoxATE (URISPAS) 100 MG tablet, Take 100 mg by mouth 3 (Three) Times a Day As Needed for bladder spasms., Disp: , Rfl:   •  TROKENDI  MG capsule sustained-release 24 hr, , Disp: , Rfl: 0  •  ZOLMitriptan (ZOMIG) 5 MG tablet, Take 5 mg by mouth 1 (One) Time As Needed for Migraine., Disp: , Rfl:     Social History     Socioeconomic History   • Marital status:      Spouse name: Not on file   • Number of children: Not on file   • Years of education: Not on file   • Highest education level: Not on file   Tobacco Use   • Smoking status: Never Smoker   • Smokeless tobacco: Never Used   Substance and Sexual Activity   • Alcohol use: " "No   • Drug use: No       Family History   Problem Relation Age of Onset   • Heart attack Mother    • Heart disease Mother         CABG   • Cancer Mother    • Hyperlipidemia Mother    • Stroke Mother    • Angina Father    • Heart attack Father    • Heart disease Father         CABG,VALVE REPLACEMENT   • Arrhythmia Father         pacemaker   • Mitral valve prolapse Father    • Cancer Father    • Hyperlipidemia Father    • Stroke Father    • Hypertension Brother    • Stroke Brother    Both parents were heavy smokers.     REVIEW OF SYSTEMS:   CONST:  No weight loss, fever, chills, weakness or fatigue.   HEENT:  No visual loss, blurred vision, double vision, yellow sclerae.                   No hearing loss, congestion, sore throat.   SKIN:      No rashes, urticaria, ulcers, sores.     RESP:     No shortness of breath, hemoptysis, cough, sputum.   GI:           No anorexia, nausea, vomiting, diarrhea. No abdominal pain, melena.   :         No burning on urination, hematuria or increased frequency. +  ENDO:    No diaphoresis, cold or heat intolerance. No polyuria or polydipsia.   NEURO:  No headache,+ dizziness, +pre syncope, - paralysis, ataxia, or parasthesias.                  No change in bowel or bladder control. No history of CVA/TIA  MUSC:    No muscle, back pain, joint pain or stiffness.   HEME:    No anemia, bleeding, bruising. No history of DVT/PE.  PSYCH:  No history of depression, anxiety    Vitals:    01/16/19 1357   BP: 114/70   BP Location: Right arm   Patient Position: Sitting   Pulse: 56   Weight: 54 kg (119 lb)   Height: 162.6 cm (64\")                 Physical Exam:  GEN: Well nourished, well-developed, no acute distress  HEENT: Normocephalic, atraumatic, PERRLA, moist mucous membranes  NECK: Supple, NO JVD, no thyromegaly, no lymphadenopathy  CARD: S1S2, RRR, no murmur, gallop, rub, PMI NL  LUNGS: Clear to auscultation, normal respiratory effort  ABDOMEN: Soft, nontender, normal bowel " sounds  EXTREMITIES: No gross deformities, no clubbing, cyanosis, or edema  SKIN: Warm, dry, no lesions  NEURO: No focal deficits, alert and oriented x 3  PSYCHIATRIC: Normal affect and mood      I personally viewed and interpreted the patient's EKG/Telemetry/lab data      ECG 12 Lead  Date/Time: 1/16/2019 2:45 PM  Performed by: Bruce Rodriguez MD  Authorized by: Bruce Rodriguez MD   Rhythm: sinus rhythm  BPM: 56                ICD-10-CM ICD-9-CM   1. Paroxysmal atrial fibrillation (CMS/HCC) I48.0 427.31   2. Tachy-clementina syndrome (CMS/HCC) I49.5 427.81       Assessment and Plan:   1. Paroxysmal Atrial Fibrillation/Tachy-Clementina Syndrome:  - moderately severe symptomatic episodes, failure of Flecainide due to side effects. She has component of tachy clementina syndrome and we are limited on use of AAD due to bradycardia.   Options include prn metoprolol for episodes of atrial fibrillation, PM implant + AAD, or PVA. PVA is recommended and probably her best option.  All options, risks, benefits, potential complications of each were discussed with the patient in detail.   CHADSVasc = 2 on Eliquis. Continue for now. For now, she will think about it and let us know. Will call in Metoprolol tartrate 25 mg prn. Stop Digoxin today.     Scribed for Bruce Rodriguez MD by Ester Mayer PA-C. 1/16/2019  2:51 PM     IBruce MD, personally performed the services described in this documentation as scribed by the above named individual in my presence, and it is both accurate and complete.  1/16/2019  2:51 PM

## 2019-02-18 ENCOUNTER — TELEPHONE (OUTPATIENT)
Dept: CARDIOLOGY | Facility: CLINIC | Age: 64
End: 2019-02-18

## 2019-02-18 NOTE — TELEPHONE ENCOUNTER
Patient was seen in the office on 1/16/19. You stopped Digoxin and gave her Metoprolol tartrate 25 mg PRN. She is calling back today stating that she is having palpitations all of the time, shortness of breath, and dizziness. Her BP=92/63 and HR=53. She also states that she had 3 episodes of sharp, pinching pain in her chest this weekend. Each episode only lasted about 5 seconds. The pain did not radiate at all, but she has never experienced this before. She also wanted to let you know that she felt better taking Metoprolol 25 mg BID.

## 2019-02-19 NOTE — TELEPHONE ENCOUNTER
I spoke with the patient and she is going to restart Metoprolol 25 mg BID. I also instructed her to hold the medication if her HR < 50.  I sent an RX to [facility] pharmacy in [place].

## 2019-03-12 ENCOUNTER — COMMUNICATION - HEALTHEAST (OUTPATIENT)
Dept: SCHEDULING | Facility: CLINIC | Age: 64
End: 2019-03-12

## 2019-03-12 ENCOUNTER — OFFICE VISIT (OUTPATIENT)
Dept: FAMILY MEDICINE | Facility: CLINIC | Age: 64
End: 2019-03-12
Payer: COMMERCIAL

## 2019-03-12 ENCOUNTER — RECORDS - HEALTHEAST (OUTPATIENT)
Dept: ADMINISTRATIVE | Facility: OTHER | Age: 64
End: 2019-03-12

## 2019-03-12 VITALS
WEIGHT: 143 LBS | HEART RATE: 70 BPM | SYSTOLIC BLOOD PRESSURE: 130 MMHG | BODY MASS INDEX: 23.8 KG/M2 | TEMPERATURE: 96.8 F | DIASTOLIC BLOOD PRESSURE: 74 MMHG

## 2019-03-12 DIAGNOSIS — R82.90 NONSPECIFIC FINDING ON EXAMINATION OF URINE: ICD-10-CM

## 2019-03-12 DIAGNOSIS — R30.0 DYSURIA: ICD-10-CM

## 2019-03-12 DIAGNOSIS — N30.01 ACUTE CYSTITIS WITH HEMATURIA: Primary | ICD-10-CM

## 2019-03-12 LAB
ALBUMIN UR-MCNC: >=300 MG/DL
APPEARANCE UR: ABNORMAL
BACTERIA #/AREA URNS HPF: ABNORMAL /HPF
BILIRUB UR QL STRIP: NEGATIVE
COLOR UR AUTO: YELLOW
GLUCOSE UR STRIP-MCNC: NEGATIVE MG/DL
HGB UR QL STRIP: ABNORMAL
KETONES UR STRIP-MCNC: 15 MG/DL
LEUKOCYTE ESTERASE UR QL STRIP: ABNORMAL
NITRATE UR QL: NEGATIVE
NON-SQ EPI CELLS #/AREA URNS LPF: ABNORMAL /LPF
PH UR STRIP: 5 PH (ref 5–7)
RBC #/AREA URNS AUTO: ABNORMAL /HPF
SOURCE: ABNORMAL
SP GR UR STRIP: 1.02 (ref 1–1.03)
TRANS CELLS #/AREA URNS HPF: ABNORMAL /HPF
UROBILINOGEN UR STRIP-ACNC: 0.2 EU/DL (ref 0.2–1)
WBC #/AREA URNS AUTO: ABNORMAL /HPF
WBC CLUMPS #/AREA URNS HPF: PRESENT /HPF

## 2019-03-12 PROCEDURE — 99213 OFFICE O/P EST LOW 20 MIN: CPT | Performed by: INTERNAL MEDICINE

## 2019-03-12 PROCEDURE — 87086 URINE CULTURE/COLONY COUNT: CPT | Performed by: INTERNAL MEDICINE

## 2019-03-12 PROCEDURE — 81001 URINALYSIS AUTO W/SCOPE: CPT | Performed by: INTERNAL MEDICINE

## 2019-03-12 PROCEDURE — 87088 URINE BACTERIA CULTURE: CPT | Performed by: INTERNAL MEDICINE

## 2019-03-12 PROCEDURE — 87186 SC STD MICRODIL/AGAR DIL: CPT | Performed by: INTERNAL MEDICINE

## 2019-03-12 RX ORDER — SULFAMETHOXAZOLE/TRIMETHOPRIM 800-160 MG
1 TABLET ORAL 2 TIMES DAILY
Qty: 6 TABLET | Refills: 0 | Status: SHIPPED | OUTPATIENT
Start: 2019-03-12 | End: 2019-03-15

## 2019-03-12 NOTE — PROGRESS NOTES
SUBJECTIVE:   Maryann Atkinson is a 63 year old female presenting with a chief complaint of   Chief Complaint   Patient presents with     UTI     pain with urination, frequency, started Sunday.        She is an established patient of [facility].    UTI    Onset of symptoms was 3day(s).  Course of illness is worsening    Current and associated symptoms dysuria, urgency and suprapubic pain and pressure  Blood in urine  Treatment and measures tried Increase fluid intake  Predisposing factors include none  Patient denies flank pain, temperature > 101 degrees F. and vomiting            Review of Systems    Past Medical History:   Diagnosis Date     SHAMA (generalized anxiety disorder) 12/13/2016     History reviewed. No pertinent family history.  Current Outpatient Medications   Medication Sig Dispense Refill     sulfamethoxazole-trimethoprim (BACTRIM DS/SEPTRA DS) 800-160 MG tablet Take 1 tablet by mouth 2 times daily for 3 days 6 tablet 0     albuterol (PROAIR HFA/PROVENTIL HFA/VENTOLIN HFA) 108 (90 BASE) MCG/ACT Inhaler Inhale 2 puffs into the lungs every 6 hours as needed for shortness of breath / dyspnea or wheezing (Patient not taking: Reported on 3/12/2019) 1 Inhaler 1     LORAZEPAM PO        XOPENEX HFA 45 MCG/ACT IN AERO 1-2 puffs q 4-6 hours prn shortness of breath/cough (Patient not taking: No sig reported) 1 0     Social History     Tobacco Use     Smoking status: Never Smoker     Smokeless tobacco: Never Used   Substance Use Topics     Alcohol use: No       OBJECTIVE  /74   Pulse 70   Temp 96.8  F (36  C) (Tympanic)   Wt 64.9 kg (143 lb)   BMI 23.80 kg/m      Physical Exam   Abdominal: Soft. There is no tenderness.   Musculoskeletal:   No CVA tenderness       Labs:  Results for orders placed or performed in visit on 03/12/19 (from the past 24 hour(s))   *UA reflex to Microscopic and Culture ([facility] (except [place] and [place])   Result Value Ref Range    Color Urine Yellow      Appearance Urine Slightly Cloudy     Glucose Urine Negative NEG^Negative mg/dL    Bilirubin Urine Negative NEG^Negative    Ketones Urine 15 (A) NEG^Negative mg/dL    Specific Gravity Urine 1.025 1.003 - 1.035    Blood Urine Large (A) NEG^Negative    pH Urine 5.0 5.0 - 7.0 pH    Protein Albumin Urine >=300 (A) NEG^Negative mg/dL    Urobilinogen Urine 0.2 0.2 - 1.0 EU/dL    Nitrite Urine Negative NEG^Negative    Leukocyte Esterase Urine Moderate (A) NEG^Negative    Source Midstream Urine    Urine Microscopic   Result Value Ref Range    WBC Urine  (A) OTO5^0 - 5 /HPF    RBC Urine  (A) OTO2^O - 2 /HPF    WBC Clumps Present (A) NEG^Negative /HPF    Squamous Epithelial /LPF Urine Few FEW^Few /LPF    Transitional Epi Few FEW^Few /HPF    Bacteria Urine Moderate (A) NEG^Negative /HPF         ASSESSMENT:      ICD-10-CM    1. Acute cystitis with hematuria N30.01 sulfamethoxazole-trimethoprim (BACTRIM DS/SEPTRA DS) 800-160 MG tablet   2. Dysuria R30.0 *UA reflex to Microscopic and Culture ([facility] (except [place] and [place])     Urine Microscopic   3. Nonspecific finding on examination of urine R82.90 Urine Culture Aerobic Bacterial          PLAN:      Patient Instructions   Bactrim 2 x day 3 days  Yogurt.      Call or return to clinic if symptoms worsen or fail to improve as anticipated.      Patient Education     Bladder Infection, Female (Adult)    Urine is normally doesn't have any bacteria in it. But bacteria can get into the urinary tract from the skin around the rectum. Or they can travel in the blood from elsewhere in the body. Once they are in your urinary tract, they can cause infection in the urethra (urethritis), the bladder (cystitis), or the kidneys (pyelonephritis).  The most common place for an infection is in the bladder. This is called a bladder infection. This is one of the most common infections in women. Most bladder infections are easily treated. They are not serious  "unless the infection spreads to the kidney.  The phrases \"bladder infection,\" \"UTI,\" and \"cystitis\" are often used to describe the same thing. But they are not always the same. Cystitis is an inflammation of the bladder. The most common cause of cystitis is an infection.  Symptoms  The infection causes inflammation in the urethra and bladder. This causes many of the symptoms. The most common symptoms of a bladder infection are:    Pain or burning when urinating    Having to urinate more often than usual    Urgent need to urinate    Only a small amount of urine comes out    Blood in urine    Abdominal discomfort. This is usually in the lower abdomen above the pubic bone.    Cloudy urine    Strong- or bad-smelling urine    Unable to urinate (urinary retention)    Unable to hold urine in (urinary incontinence)    Fever    Loss of appetite    Confusion (in older adults)  Causes  Bladder infections are not contagious. You can't get one from someone else, from a toilet seat, or from sharing a bath.  The most common cause of bladder infections is bacteria from the bowels. The bacteria get onto the skin around the opening of the urethra. From there, they can get into the urine and travel up to the bladder, causing inflammation and infection. This usually happens because of:    Wiping improperly after urinating. Always wipe from front to back.    Bowel incontinence    Pregnancy    Procedures such as having a catheter inserted    Older age    Not emptying your bladder. This can allow bacteria a chance to grow in your urine.    Dehydration    Constipation    Sex    Use of a diaphragm for birth control   Treatment  Bladder infections are diagnosed by a urine test. They are treated with antibiotics and usually clear up quickly without complications. Treatment helps prevent a more serious kidney infection.  Medicines  Medicines can help in the treatment of a bladder infection:    Take antibiotics until they are used up, even if " you feel better. It is important to finish them to make sure the infection has cleared.    You can use acetaminophen or ibuprofen for pain, fever, or discomfort, unless another medicine was prescribed. If you have chronic liver or kidney disease, talk with your healthcare provider before using these medicines. Also talk with your provider if you've ever had a stomach ulcer or gastrointestinal bleeding, or are taking blood-thinner medicines.    If you are given phenazopydridine to reduce burning with urination, it will cause your urine to become a bright orange color. This can stain clothing.  Care and prevention  These self-care steps can help prevent future infections:    Drink plenty of fluids to prevent dehydration and flush out your bladder. Do this unless you must restrict fluids for other health reasons, or your doctor told you not to.    Proper cleaning after going to the bathroom is important. Wipe from front to back after using the toilet to prevent the spread of bacteria.    Urinate more often. Don't try to hold urine in for a long time.    Wear loose-fitting clothes and cotton underwear. Avoid tight-fitting pants.    Improve your diet and prevent constipation. Eat more fresh fruit and vegetables, and fiber, and less junk and fatty foods.    Avoid sex until your symptoms are gone.    Avoid caffeine, alcohol, and spicy foods. These can irritate your bladder.    Urinate right after intercourse to flush out your bladder.    If you use birth control pills and have frequent bladder infections, discuss it with your doctor.  Follow-up care  Call your healthcare provider if all symptoms are not gone after 3 days of treatment. This is especially important if you have repeat infections.  If a culture was done, you will be told if your treatment needs to be changed. If directed, you can call to find out the results.  If X-rays were done, you will be told if the results will affect your treatment.  Call 911  Call [phone]  if any of the following occur:    Trouble breathing    Hard to wake up or confusion    Fainting or loss of consciousness    Rapid heart rate  When to seek medical advice  Call your healthcare provider right away if any of these occur:    Fever of 100.4 F (38.0 C) or higher, or as directed by your healthcare provider    Symptoms are not better by the third day of treatment    Back or belly (abdominal) pain that gets worse    Repeated vomiting, or unable to keep medicine down    Weakness or dizziness    Vaginal discharge    Pain, redness, or swelling in the outer vaginal area (labia)  Date Last Reviewed: 10/1/2016    3912-5033 [employer]. [address] [place] [address]. All rights reserved. This information is not intended as a substitute for professional medical care. Always follow your healthcare professional's instructions.

## 2019-03-12 NOTE — PATIENT INSTRUCTIONS
"Bactrim 2 x day 3 days  Yogurt.      Call or return to clinic if symptoms worsen or fail to improve as anticipated.      Patient Education     Bladder Infection, Female (Adult)    Urine is normally doesn't have any bacteria in it. But bacteria can get into the urinary tract from the skin around the rectum. Or they can travel in the blood from elsewhere in the body. Once they are in your urinary tract, they can cause infection in the urethra (urethritis), the bladder (cystitis), or the kidneys (pyelonephritis).  The most common place for an infection is in the bladder. This is called a bladder infection. This is one of the most common infections in women. Most bladder infections are easily treated. They are not serious unless the infection spreads to the kidney.  The phrases \"bladder infection,\" \"UTI,\" and \"cystitis\" are often used to describe the same thing. But they are not always the same. Cystitis is an inflammation of the bladder. The most common cause of cystitis is an infection.  Symptoms  The infection causes inflammation in the urethra and bladder. This causes many of the symptoms. The most common symptoms of a bladder infection are:    Pain or burning when urinating    Having to urinate more often than usual    Urgent need to urinate    Only a small amount of urine comes out    Blood in urine    Abdominal discomfort. This is usually in the lower abdomen above the pubic bone.    Cloudy urine    Strong- or bad-smelling urine    Unable to urinate (urinary retention)    Unable to hold urine in (urinary incontinence)    Fever    Loss of appetite    Confusion (in older adults)  Causes  Bladder infections are not contagious. You can't get one from someone else, from a toilet seat, or from sharing a bath.  The most common cause of bladder infections is bacteria from the bowels. The bacteria get onto the skin around the opening of the urethra. From there, they can get into the urine and travel up to the bladder, " causing inflammation and infection. This usually happens because of:    Wiping improperly after urinating. Always wipe from front to back.    Bowel incontinence    Pregnancy    Procedures such as having a catheter inserted    Older age    Not emptying your bladder. This can allow bacteria a chance to grow in your urine.    Dehydration    Constipation    Sex    Use of a diaphragm for birth control   Treatment  Bladder infections are diagnosed by a urine test. They are treated with antibiotics and usually clear up quickly without complications. Treatment helps prevent a more serious kidney infection.  Medicines  Medicines can help in the treatment of a bladder infection:    Take antibiotics until they are used up, even if you feel better. It is important to finish them to make sure the infection has cleared.    You can use acetaminophen or ibuprofen for pain, fever, or discomfort, unless another medicine was prescribed. If you have chronic liver or kidney disease, talk with your healthcare provider before using these medicines. Also talk with your provider if you've ever had a stomach ulcer or gastrointestinal bleeding, or are taking blood-thinner medicines.    If you are given phenazopydridine to reduce burning with urination, it will cause your urine to become a bright orange color. This can stain clothing.  Care and prevention  These self-care steps can help prevent future infections:    Drink plenty of fluids to prevent dehydration and flush out your bladder. Do this unless you must restrict fluids for other health reasons, or your doctor told you not to.    Proper cleaning after going to the bathroom is important. Wipe from front to back after using the toilet to prevent the spread of bacteria.    Urinate more often. Don't try to hold urine in for a long time.    Wear loose-fitting clothes and cotton underwear. Avoid tight-fitting pants.    Improve your diet and prevent constipation. Eat more fresh fruit and  vegetables, and fiber, and less junk and fatty foods.    Avoid sex until your symptoms are gone.    Avoid caffeine, alcohol, and spicy foods. These can irritate your bladder.    Urinate right after intercourse to flush out your bladder.    If you use birth control pills and have frequent bladder infections, discuss it with your doctor.  Follow-up care  Call your healthcare provider if all symptoms are not gone after 3 days of treatment. This is especially important if you have repeat infections.  If a culture was done, you will be told if your treatment needs to be changed. If directed, you can call to find out the results.  If X-rays were done, you will be told if the results will affect your treatment.  Call 911  Call 911 if any of the following occur:    Trouble breathing    Hard to wake up or confusion    Fainting or loss of consciousness    Rapid heart rate  When to seek medical advice  Call your healthcare provider right away if any of these occur:    Fever of 100.4 F (38.0 C) or higher, or as directed by your healthcare provider    Symptoms are not better by the third day of treatment    Back or belly (abdominal) pain that gets worse    Repeated vomiting, or unable to keep medicine down    Weakness or dizziness    Vaginal discharge    Pain, redness, or swelling in the outer vaginal area (labia)  Date Last Reviewed: 10/1/2016    1448-2498 [employer]. [address] [place] [address], [place] [address]. All rights reserved. This information is not intended as a substitute for professional medical care. Always follow your healthcare professional's instructions.

## 2019-03-12 NOTE — Clinical Note
Please abstract the following data from this visit with this patient into the appropriate field in Epic:Colonoscopy done on this date: 1/5/19 (approximately), by this group: [facility], results were  normal. Mammogram done on this date: 9/17 (approximately), by this group: [facility], results were normal. Pap smear done on this date: 2 years ago (approximately), by this group: [facility], results were normal, hysterectomy.

## 2019-03-14 ENCOUNTER — TELEPHONE (OUTPATIENT)
Dept: URGENT CARE | Facility: URGENT CARE | Age: 64
End: 2019-03-14

## 2019-03-14 LAB
BACTERIA SPEC CULT: ABNORMAL
SPECIMEN SOURCE: ABNORMAL

## 2019-03-14 NOTE — TELEPHONE ENCOUNTER
Pt would like to know if she could have a second abx(alternative)  She thinks the bactrim is making her feels sick, and have a headache  Her uti feels better- but she doesn't want to take the last dose of Bactrim.   She grew out 100,000 colonies of e. Coli  Ok to send in a short course of a diff abx? Culture done and pharmacy loaded- allergic to zithromax    Thanks!     Rosalie Beckham RN

## 2019-03-15 NOTE — TELEPHONE ENCOUNTER
Patient is calling to check on the status of this and would like a call back asap. Please advise, thank you!    Call mobile first, No VM set up.   Home has     Skye Vaughn

## 2019-03-15 NOTE — TELEPHONE ENCOUNTER
Triaged discussed with patient. Her UTI sx are gone.  She wants [facility] [place] Dr. Wm Jay to get urine  results and will fax as she is asking for a repeat UA if needed.  Will direct her to her primary care clinic.  Phone [phone]  [phone]  Varsha Hinkle RN

## 2019-03-15 NOTE — TELEPHONE ENCOUNTER
Dr. Hinkle can yo respond to this message as the  provider did not .  Thank you.  Varsha Hinkle RN

## 2019-04-01 ENCOUNTER — TELEPHONE (OUTPATIENT)
Dept: CARDIOLOGY | Facility: CLINIC | Age: 64
End: 2019-04-01

## 2019-04-01 DIAGNOSIS — I48.0 PAROXYSMAL ATRIAL FIBRILLATION (HCC): Primary | ICD-10-CM

## 2019-04-01 NOTE — TELEPHONE ENCOUNTER
PT calling to let us know that her PCP stopped her metoprolol tart 25mg 1 bid due to bradycardia. She never would check her HR prior to taking her metoprolol. I instructed her to try 12.5mg 1 BID but to check HR and if lower than 60 to hold. Her rates was 40's on 25mg 1 bid and felt tired and fatigued. She also wants to go ahead and schedule PVA for July after her trip.

## 2019-04-03 ENCOUNTER — TELEPHONE (OUTPATIENT)
Dept: FAMILY MEDICINE | Facility: CLINIC | Age: 64
End: 2019-04-03

## 2019-04-03 NOTE — TELEPHONE ENCOUNTER
Called pt x2 and no answer- but vm not set up.  Will have to try her back later.   Thanks!     Rosalie Beckham RN

## 2019-04-03 NOTE — TELEPHONE ENCOUNTER
Reason for Call:  Other call back    Detailed comments: Patient would like a call back to discus a UTI.    Phone Number Patient can be reached at: Cell number on file:    Telephone Information:   Mobile [phone]       Best Time: Any     Can we leave a detailed message on this number? YES    Call taken on 4/3/2019 at 11:38 AM by Jordan Concepcion

## 2019-04-05 ENCOUNTER — COMMUNICATION - HEALTHEAST (OUTPATIENT)
Dept: INTERNAL MEDICINE | Facility: CLINIC | Age: 64
End: 2019-04-05

## 2019-04-05 DIAGNOSIS — F41.1 GAD (GENERALIZED ANXIETY DISORDER): ICD-10-CM

## 2019-04-16 ENCOUNTER — OFFICE VISIT - HEALTHEAST (OUTPATIENT)
Dept: INTERNAL MEDICINE | Facility: CLINIC | Age: 64
End: 2019-04-16

## 2019-04-16 ENCOUNTER — COMMUNICATION - HEALTHEAST (OUTPATIENT)
Dept: TELEHEALTH | Facility: CLINIC | Age: 64
End: 2019-04-16

## 2019-04-16 DIAGNOSIS — M79.10 MYALGIA: ICD-10-CM

## 2019-04-16 DIAGNOSIS — Z12.31 VISIT FOR SCREENING MAMMOGRAM: ICD-10-CM

## 2019-04-16 DIAGNOSIS — R53.82 CHRONIC FATIGUE: ICD-10-CM

## 2019-04-16 DIAGNOSIS — E78.2 MIXED HYPERLIPIDEMIA: ICD-10-CM

## 2019-04-16 LAB
ALBUMIN SERPL-MCNC: 4.2 G/DL (ref 3.5–5)
ALP SERPL-CCNC: 82 U/L (ref 45–120)
ALT SERPL W P-5'-P-CCNC: 15 U/L (ref 0–45)
ANION GAP SERPL CALCULATED.3IONS-SCNC: 8 MMOL/L (ref 5–18)
AST SERPL W P-5'-P-CCNC: 19 U/L (ref 0–40)
BILIRUB SERPL-MCNC: 0.6 MG/DL (ref 0–1)
BUN SERPL-MCNC: 14 MG/DL (ref 8–22)
CALCIUM SERPL-MCNC: 9.9 MG/DL (ref 8.5–10.5)
CHLORIDE BLD-SCNC: 104 MMOL/L (ref 98–107)
CHOLEST SERPL-MCNC: 246 MG/DL
CK SERPL-CCNC: 56 U/L (ref 30–190)
CO2 SERPL-SCNC: 26 MMOL/L (ref 22–31)
CREAT SERPL-MCNC: 0.77 MG/DL (ref 0.6–1.1)
ERYTHROCYTE [DISTWIDTH] IN BLOOD BY AUTOMATED COUNT: 11.8 % (ref 11–14.5)
ERYTHROCYTE [SEDIMENTATION RATE] IN BLOOD BY WESTERGREN METHOD: 9 MM/HR (ref 0–20)
FASTING STATUS PATIENT QL REPORTED: ABNORMAL
GFR SERPL CREATININE-BSD FRML MDRD: >60 ML/MIN/1.73M2
GLUCOSE BLD-MCNC: 100 MG/DL (ref 70–125)
HCT VFR BLD AUTO: 40.6 % (ref 35–47)
HDLC SERPL-MCNC: 107 MG/DL
HGB BLD-MCNC: 14.1 G/DL (ref 12–16)
LDLC SERPL CALC-MCNC: 127 MG/DL
MCH RBC QN AUTO: 31.1 PG (ref 27–34)
MCHC RBC AUTO-ENTMCNC: 34.8 G/DL (ref 32–36)
MCV RBC AUTO: 89 FL (ref 80–100)
PLATELET # BLD AUTO: 257 THOU/UL (ref 140–440)
PMV BLD AUTO: 8.1 FL (ref 7–10)
POTASSIUM BLD-SCNC: 4.6 MMOL/L (ref 3.5–5)
PROT SERPL-MCNC: 7.2 G/DL (ref 6–8)
RBC # BLD AUTO: 4.54 MILL/UL (ref 3.8–5.4)
SODIUM SERPL-SCNC: 138 MMOL/L (ref 136–145)
TRIGL SERPL-MCNC: 58 MG/DL
TSH SERPL DL<=0.005 MIU/L-ACNC: 1.71 UIU/ML (ref 0.3–5)
WBC: 4.3 THOU/UL (ref 4–11)

## 2019-04-16 ASSESSMENT — MIFFLIN-ST. JEOR: SCORE: 1180.66

## 2019-04-22 ENCOUNTER — TELEPHONE (OUTPATIENT)
Dept: CARDIOLOGY | Facility: CLINIC | Age: 64
End: 2019-04-22

## 2019-04-22 NOTE — TELEPHONE ENCOUNTER
Patient is calling back today to let us know that she had an episode of A-fib/hypotension. She went to the ER at [facility]. They wanted to transfer her to [facility] and she refused. She had been having chest pain/squeezing off and on. She said that she was not in A-fib on arrival or during her stay. She was in NSR. She feels fine today but wants to schedule an appointment with a PA/ APRN to be evaluated because the chest pain is not related to A-fib. I instructed her to go to the ER if she has another episode of chest pain again before her appointment date. She agreed and verbalized understanding.I transferred her to scheduling.

## 2019-04-23 ENCOUNTER — RECORDS - HEALTHEAST (OUTPATIENT)
Dept: MAMMOGRAPHY | Facility: CLINIC | Age: 64
End: 2019-04-23

## 2019-04-23 DIAGNOSIS — Z12.31 ENCOUNTER FOR SCREENING MAMMOGRAM FOR MALIGNANT NEOPLASM OF BREAST: ICD-10-CM

## 2019-04-25 ENCOUNTER — OFFICE VISIT (OUTPATIENT)
Dept: CARDIOLOGY | Facility: CLINIC | Age: 64
End: 2019-04-25

## 2019-04-25 VITALS
HEART RATE: 60 BPM | DIASTOLIC BLOOD PRESSURE: 62 MMHG | BODY MASS INDEX: 20.42 KG/M2 | HEIGHT: 64 IN | WEIGHT: 119.6 LBS | SYSTOLIC BLOOD PRESSURE: 102 MMHG

## 2019-04-25 DIAGNOSIS — R07.2 PRECORDIAL PAIN: ICD-10-CM

## 2019-04-25 DIAGNOSIS — R00.2 PALPITATIONS: Primary | ICD-10-CM

## 2019-04-25 DIAGNOSIS — I48.0 PAROXYSMAL ATRIAL FIBRILLATION (HCC): ICD-10-CM

## 2019-04-25 PROCEDURE — 99214 OFFICE O/P EST MOD 30 MIN: CPT | Performed by: PHYSICIAN ASSISTANT

## 2019-04-25 RX ORDER — TOPIRAMATE 25 MG/1
25 CAPSULE, COATED PELLETS ORAL DAILY
COMMUNITY
End: 2022-01-05

## 2019-04-25 NOTE — PROGRESS NOTES
[facility] at [facility]   OFFICE NOTE      Kiesha Dempsey  1955  PCP: Tyler Rucker MD    SUBJECTIVE:   Kiesha Dempsey is a 64 y.o. female seen for a follow up visit regarding the following:     CC:Chest pain    Problem List:  1. Paroxysmal Atrial Fibrillation  a. CHADSVasc = 2 on Eliquis  b. 24 Hour Holter 3/2018: no atrial fibrillation.   c. Diagnosed at PCP by EKG with symptoms of palpitations 11/15/18  d. Treated with metoprolol - caused low BP and low HR  e. Flecainide started 12/2018 - intolerant due to HA  f. Echocardiogram 2/15/18: EF 60%, mild TR  g. Stress Test 4/9/18: EF 69%, no ischemia. Low risk study.   h. Admit to [facility] ER for Atypical chest pain, Negative markers. Normal EKG Sinus Bradycardia.  2. Frequent UTIs  3. Migraines  4. Colon polyps  5. Chronic nausea  6. Surgical History  a. Hyterectomy  b. Total hip arthroplasty  c. Vein surgery        HPI:     The patient is a pleasant 64-year-old female seen in office regarding history of atrial fibrillation and chest pain.  Mrs. Dempsey has seen Dr. Rodriguez is considering pulmonary vein ablation for atrial fibrillation as she also has sick sinus syndrome with intolerance to beta-blocker therapy.  The patient reports over the past couple weeks has been doing a lot of physical exertion because her  was out of town she had to carry a big 50 pound bags of feed for her goats.  She states on Saturday she began having sharp stabbing knifelike pain in the central region of her chest that radiated to her back.  This was while she was sitting down at rest.  She felt the pain is quite severe therefore she presented to the [facility] ER.  In the ER her cardiac work-up was negative normal EKG and negative cardiac markers.  Her symptoms suggest muscle skeletal discomfort.  She is also concerned about atrial fibrillation continues to have breakthrough episodes longest episode lasting a couple hours.  She was unsure if  the chest pain A. fib were related to each other.  She denies heart failure symptoms she denies chest pain suggesting angina pectoris.  She states that atrial fibrillation is still occurring but not as regular as it used to lasting a few hours and she does get symptomatic with palpitations fatigue and weakness.      ROS:  Review of Symptoms:  General: no recent weight loss/gain, weakness or fatigue  Skin: no rashes, lumps, or other skin changes  HEENT: no dizziness, lightheadedness, or vision changes  Respiratory: no cough or hemoptysis  Cardiovascular: + palpitations, and tachycardia  Gastrointestinal: no black/tarry stools or diarrhea  Urinary: no change in frequency or urgency  Peripheral Vascular: no claudication or leg cramps  Musculoskeletal: no muscle or joint pain/stiffness. Positive chest wall pain  Psychiatric: no depression or excessive stress  Neurological: no sensory or motor loss, no syncope  Hematologic: no anemia, easy bruising or bleeding  Endocrine: no thyroid problems, nor heat or cold intolerance    Cardiac PMH: (Old records have been reviewed and summarized below)      Past Medical History, Past Surgical History, Family history, Social History, and Medications were all reviewed with the patient today and updated as necessary.       Current Outpatient Medications:   •  apixaban (ELIQUIS) 5 MG tablet tablet, Take 1 tablet by mouth Every 12 (Twelve) Hours., Disp: 60 tablet, Rfl: 11  •  BUTALBITAL-ACETAMINOPHEN PO, Take  by mouth Daily. With caffeine as well, Disp: , Rfl:   •  digoxin (LANOXIN) 250 MCG tablet, Take 1 tablet by mouth Daily. (Patient taking differently: Take 125 mcg by mouth 2 (Two) Times a Day.), Disp: 90 tablet, Rfl: 4  •  Estriol 10 % cream, Insert 1 mL into the vagina Daily As Needed., Disp: , Rfl: 5  •  flavoxATE (URISPAS) 100 MG tablet, Take 100 mg by mouth 3 (Three) Times a Day As Needed for bladder spasms., Disp: , Rfl:   •  metoprolol tartrate (LOPRESSOR) 25 MG tablet, Take 1  tablet by mouth Daily As Needed (up to twice a day, for a-fib)., Disp: 30 tablet, Rfl: 3  •  metoprolol tartrate (LOPRESSOR) 25 MG tablet, Take 1 tablet by mouth 2 (Two) Times a Day., Disp: 180 tablet, Rfl: 3  •  TROKENDI  MG capsule sustained-release 24 hr, , Disp: , Rfl: 0  •  ZOLMitriptan (ZOMIG) 5 MG tablet, Take 5 mg by mouth 1 (One) Time As Needed for Migraine., Disp: , Rfl:       Allergies   Allergen Reactions   • Bactrim [Sulfamethoxazole-Trimethoprim] Hives   • Tramadol Shortness Of Breath, Nausea Only and Other (See Comments)     CHEST PAIN   • Flecainide Other (See Comments)     Headaches       Patient Active Problem List   Diagnosis   • Precordial pain   • SOB (shortness of breath)   • Palpitations   • Syncope   • Abnormal ECG   • Murmur, cardiac   • Pulmonary hypertension (CMS/HCC)   • Paroxysmal atrial fibrillation (CMS/HCC)   • Tachy-clementina syndrome (CMS/HCC)     Past Medical History:   Diagnosis Date   • Colon polyp    • Frequent UTI    • Kidney infection    • Migraines      Past Surgical History:   Procedure Laterality Date   • HYSTERECTOMY  2006   • TOTAL HIP ARTHROPLASTY  2015   • VEIN SURGERY  2005     Family History   Problem Relation Age of Onset   • Heart attack Mother    • Heart disease Mother         CABG   • Cancer Mother    • Hyperlipidemia Mother    • Stroke Mother    • Angina Father    • Heart attack Father    • Heart disease Father         CABG,VALVE REPLACEMENT   • Arrhythmia Father         pacemaker   • Mitral valve prolapse Father    • Cancer Father    • Hyperlipidemia Father    • Stroke Father    • Hypertension Brother    • Stroke Brother      Social History     Tobacco Use   • Smoking status: Never Smoker   • Smokeless tobacco: Never Used   Substance Use Topics   • Alcohol use: No         PHYSICAL EXAM:    There were no vitals taken for this visit.       Wt Readings from Last 5 Encounters:   01/16/19 54 kg (119 lb)   12/13/18 53.5 kg (118 lb)   04/17/18 55.8 kg (123 lb)    02/15/18 54 kg (119 lb)   02/08/18 54.1 kg (119 lb 3.2 oz)       BP Readings from Last 5 Encounters:   01/16/19 114/70   12/13/18 110/62   04/17/18 110/78   02/15/18 120/64   02/08/18 120/62       General appearance - Alert, well appearing, and in no distress   Mental status - Affect appropriate to mood.  Eyes - Sclerae anicteric,  ENMT - Hearing grossly normal bilaterally, Dental hygiene good.  Neck - Carotids upstroke normal bilaterally, no bruits, no JVD.  Resp - Clear to auscultation, no wheezes, rales or rhonchi, symmetric air entry.  Heart - Normal rate, regular rhythm, normal S1, S2, no murmurs, rubs, clicks or gallops.  GI - Soft, nontender, nondistended, no masses or organomegaly.  Neurological - Grossly intact - normal speech, no focal findings  Musculoskeletal - No joint tenderness, deformity or swelling, no muscular tenderness noted.  Extremities - Peripheral pulses normal, no pedal edema, no clubbing or cyanosis.  Skin - Normal coloration and turgor.  Psych -  oriented to person, place, and time.    Medical problems and test results were reviewed with the patient today.     No results found for this or any previous visit (from the past 672 hour(s)).            ASSESSMENT   1. Afib: Intolerance to flecainide secondary to headaches and intolerance to beta-blocker secondary to severe bradycardia.  Patient still considering point of an ablation procedure.  2. Symptomatic Bradycardia, Early SSS  2. Chest pain: Her chest pain is musculoskeletal in nature as it is reproducible with palpation in central region or chest.  We have instructed her to use Tylenol and NSAIDs sparingly for discomfort.  3. Migraine headaches  4. Anticoagulation: Continue Eliquis    PLAN  · Today we discussed in detail options for treatment of atrial fibrillation such as other types of antiarrhythmic medication versus considering PVA.  We explained to her again the Tomassoni is recommended as the best option for her as she is  intolerant to AAD in past.  She like to be scheduled near future she is taking a trip to [place] for 6 weeks and would like to pursue the procedure after this.  For now she will take a half a dose of Lopressor if she has breakthrough episodes of atrial fibrillation.  We recommend she continue Eliquis 5 mg twice daily.  · Atypical chest pain, her discomfort is muscle skeletal nature as she has an area in the midsternal region that is tender to palpation.  She will use Tylenol and NSAIDs for discomfort.  · Return for follow-up as scheduled.      4/25/2019  10:31 AM    Will Amalia ALMODOVAR

## 2019-05-06 ENCOUNTER — RECORDS - HEALTHEAST (OUTPATIENT)
Dept: RADIOLOGY | Facility: CLINIC | Age: 64
End: 2019-05-06

## 2019-05-06 ENCOUNTER — RECORDS - HEALTHEAST (OUTPATIENT)
Dept: ADMINISTRATIVE | Facility: OTHER | Age: 64
End: 2019-05-06

## 2019-05-30 ENCOUNTER — COMMUNICATION - HEALTHEAST (OUTPATIENT)
Dept: INTERNAL MEDICINE | Facility: CLINIC | Age: 64
End: 2019-05-30

## 2019-05-30 ENCOUNTER — OFFICE VISIT - HEALTHEAST (OUTPATIENT)
Dept: INTERNAL MEDICINE | Facility: CLINIC | Age: 64
End: 2019-05-30

## 2019-05-30 ENCOUNTER — COMMUNICATION - HEALTHEAST (OUTPATIENT)
Dept: SCHEDULING | Facility: CLINIC | Age: 64
End: 2019-05-30

## 2019-05-30 DIAGNOSIS — H00.012 HORDEOLUM EXTERNUM OF RIGHT LOWER EYELID: ICD-10-CM

## 2019-06-12 ENCOUNTER — COMMUNICATION - HEALTHEAST (OUTPATIENT)
Dept: INTERNAL MEDICINE | Facility: CLINIC | Age: 64
End: 2019-06-12

## 2019-06-12 DIAGNOSIS — B36.9 FUNGAL DERMATITIS: ICD-10-CM

## 2019-07-12 ENCOUNTER — COMMUNICATION - HEALTHEAST (OUTPATIENT)
Dept: INTERNAL MEDICINE | Facility: CLINIC | Age: 64
End: 2019-07-12

## 2019-07-12 DIAGNOSIS — F41.1 GAD (GENERALIZED ANXIETY DISORDER): ICD-10-CM

## 2019-07-25 ENCOUNTER — PREP FOR SURGERY (OUTPATIENT)
Dept: OTHER | Facility: HOSPITAL | Age: 64
End: 2019-07-25

## 2019-07-25 DIAGNOSIS — I48.0 PAROXYSMAL ATRIAL FIBRILLATION (HCC): Primary | ICD-10-CM

## 2019-07-25 RX ORDER — PROMETHAZINE HYDROCHLORIDE 25 MG/ML
12.5 INJECTION, SOLUTION INTRAMUSCULAR; INTRAVENOUS EVERY 4 HOURS PRN
Status: CANCELLED | OUTPATIENT
Start: 2019-07-25

## 2019-07-25 RX ORDER — SODIUM CHLORIDE 9 MG/ML
1 INJECTION, SOLUTION INTRAVENOUS CONTINUOUS
Status: CANCELLED | OUTPATIENT
Start: 2019-07-25 | End: 2019-07-25

## 2019-07-25 RX ORDER — NITROGLYCERIN 0.4 MG/1
0.4 TABLET SUBLINGUAL
Status: CANCELLED | OUTPATIENT
Start: 2019-07-25

## 2019-07-25 RX ORDER — SODIUM CHLORIDE 0.9 % (FLUSH) 0.9 %
3 SYRINGE (ML) INJECTION EVERY 12 HOURS SCHEDULED
Status: CANCELLED | OUTPATIENT
Start: 2019-07-25

## 2019-07-25 RX ORDER — ACETAMINOPHEN 325 MG/1
650 TABLET ORAL EVERY 4 HOURS PRN
Status: CANCELLED | OUTPATIENT
Start: 2019-07-25

## 2019-07-25 RX ORDER — SODIUM CHLORIDE 0.9 % (FLUSH) 0.9 %
1-10 SYRINGE (ML) INJECTION AS NEEDED
Status: CANCELLED | OUTPATIENT
Start: 2019-07-25

## 2019-10-23 ENCOUNTER — TELEPHONE (OUTPATIENT)
Dept: CARDIOLOGY | Facility: CLINIC | Age: 64
End: 2019-10-23

## 2019-10-23 RX ORDER — CARVEDILOL 3.12 MG/1
3.12 TABLET ORAL 2 TIMES DAILY
Qty: 60 TABLET | Refills: 6
Start: 2019-10-23 | End: 2019-11-01 | Stop reason: HOSPADM

## 2019-10-23 NOTE — TELEPHONE ENCOUNTER
I tried to call the patient to clarify her medications but her  said that she was not available. He said that he would have her call us back.

## 2019-10-23 NOTE — TELEPHONE ENCOUNTER
Regarding: Non-Urgent Medical Question  Contact: [phone]  ----- Message from [employer], [employer] sent at 10/23/2019  7:42 AM EDT -----    Before my schedule surgical procedure next week, should I stop or change anything with the medications I am taking?   present medications:   Eliquis 5 mq, twice daily  Topiramate 25 mg, twice daily  Carvedilol 3.125 mg, twice daily   ZolMitriptan 5 mg, as needed for Migraine (approx. every 10 -12 days)  Supplements:   Co-Q10  Probiotics    Kiesha Dempsey

## 2019-10-25 DIAGNOSIS — R06.83 SNORING: Primary | ICD-10-CM

## 2019-10-28 ENCOUNTER — TELEPHONE (OUTPATIENT)
Dept: CARDIOLOGY | Facility: HOSPITAL | Age: 64
End: 2019-10-28

## 2019-10-28 NOTE — TELEPHONE ENCOUNTER
Pt advised of MD task.  Sheeba Ramon aware of cryoablation.    Estrella Wiggins RN    ----- Message from Bruce Rodriguez MD sent at 10/27/2019  2:31 PM EDT -----  Yes schedule for cryoablation    GT    ----- Message -----  From: Estrella Wiggins RN  Sent: 10/25/2019   3:14 PM  To: Bruce Rodriguez MD    PVA scheduled 10/31    Pt with h/o severe UTIs following franks catheter for hip surgery in 2015.  Pt is anxious about franks can PVA be done without franks?

## 2019-10-30 ENCOUNTER — HOSPITAL ENCOUNTER (OUTPATIENT)
Dept: CT IMAGING | Facility: HOSPITAL | Age: 64
Discharge: HOME | End: 2019-10-30
Admitting: INTERNAL MEDICINE

## 2019-10-30 ENCOUNTER — APPOINTMENT (OUTPATIENT)
Dept: PREADMISSION TESTING | Facility: HOSPITAL | Age: 64
End: 2019-10-30

## 2019-10-30 DIAGNOSIS — I48.0 PAROXYSMAL ATRIAL FIBRILLATION (HCC): Primary | ICD-10-CM

## 2019-10-30 LAB
ANION GAP SERPL CALCULATED.3IONS-SCNC: 7 MMOL/L (ref 5–15)
BUN BLD-MCNC: 19 MG/DL (ref 8–23)
BUN/CREAT SERPL: 23.8 (ref 7–25)
CALCIUM SPEC-SCNC: 8.9 MG/DL (ref 8.6–10.5)
CHLORIDE SERPL-SCNC: 107 MMOL/L (ref 98–107)
CO2 SERPL-SCNC: 28 MMOL/L (ref 22–29)
CREAT BLD-MCNC: 0.8 MG/DL (ref 0.57–1)
DEPRECATED RDW RBC AUTO: 42.2 FL (ref 37–54)
ERYTHROCYTE [DISTWIDTH] IN BLOOD BY AUTOMATED COUNT: 11.7 % (ref 12.3–15.4)
GFR SERPL CREATININE-BSD FRML MDRD: 72 ML/MIN/1.73
GLUCOSE BLD-MCNC: 86 MG/DL (ref 65–99)
HBA1C MFR BLD: 5.4 % (ref 4.8–5.6)
HCT VFR BLD AUTO: 42.4 % (ref 34–46.6)
HGB BLD-MCNC: 13.6 G/DL (ref 12–15.9)
INR PPP: 1.42 (ref 0.85–1.16)
MCH RBC QN AUTO: 31.1 PG (ref 26.6–33)
MCHC RBC AUTO-ENTMCNC: 32.1 G/DL (ref 31.5–35.7)
MCV RBC AUTO: 97 FL (ref 79–97)
PLATELET # BLD AUTO: 171 10*3/MM3 (ref 140–450)
PMV BLD AUTO: 9.8 FL (ref 6–12)
POTASSIUM BLD-SCNC: 4.4 MMOL/L (ref 3.5–5.2)
PROTHROMBIN TIME: 16.7 SECONDS (ref 11.2–14.3)
RBC # BLD AUTO: 4.37 10*6/MM3 (ref 3.77–5.28)
SODIUM BLD-SCNC: 142 MMOL/L (ref 136–145)
TSH SERPL DL<=0.05 MIU/L-ACNC: 4.05 UIU/ML (ref 0.27–4.2)
WBC NRBC COR # BLD: 3.96 10*3/MM3 (ref 3.4–10.8)

## 2019-10-30 PROCEDURE — 80048 BASIC METABOLIC PNL TOTAL CA: CPT | Performed by: NURSE PRACTITIONER

## 2019-10-30 PROCEDURE — 84443 ASSAY THYROID STIM HORMONE: CPT | Performed by: NURSE PRACTITIONER

## 2019-10-30 PROCEDURE — 85610 PROTHROMBIN TIME: CPT | Performed by: NURSE PRACTITIONER

## 2019-10-30 PROCEDURE — 71275 CT ANGIOGRAPHY CHEST: CPT

## 2019-10-30 PROCEDURE — 36415 COLL VENOUS BLD VENIPUNCTURE: CPT

## 2019-10-30 PROCEDURE — 85027 COMPLETE CBC AUTOMATED: CPT | Performed by: NURSE PRACTITIONER

## 2019-10-30 PROCEDURE — 83036 HEMOGLOBIN GLYCOSYLATED A1C: CPT | Performed by: NURSE PRACTITIONER

## 2019-10-30 PROCEDURE — 0 IOPAMIDOL PER 1 ML: Performed by: INTERNAL MEDICINE

## 2019-10-30 RX ORDER — UBIDECARENONE 100 MG
100 CAPSULE ORAL DAILY
COMMUNITY
End: 2022-01-05

## 2019-10-30 RX ADMIN — IOPAMIDOL 80 ML: 755 INJECTION, SOLUTION INTRAVENOUS at 10:58

## 2019-10-31 ENCOUNTER — RECORDS - HEALTHEAST (OUTPATIENT)
Dept: ADMINISTRATIVE | Facility: OTHER | Age: 64
End: 2019-10-31

## 2019-10-31 ENCOUNTER — HOSPITAL ENCOUNTER (OUTPATIENT)
Facility: HOSPITAL | Age: 64
Discharge: HOME | End: 2019-11-01
Attending: INTERNAL MEDICINE | Admitting: INTERNAL MEDICINE

## 2019-10-31 ENCOUNTER — ANESTHESIA (OUTPATIENT)
Dept: CARDIOLOGY | Facility: HOSPITAL | Age: 64
End: 2019-10-31

## 2019-10-31 ENCOUNTER — ANESTHESIA EVENT (OUTPATIENT)
Dept: CARDIOLOGY | Facility: HOSPITAL | Age: 64
End: 2019-10-31

## 2019-10-31 DIAGNOSIS — I48.0 PAROXYSMAL ATRIAL FIBRILLATION (HCC): ICD-10-CM

## 2019-10-31 LAB
ACT BLD: 125 SECONDS (ref 82–152)
ACT BLD: 125 SECONDS (ref 82–152)
ACT BLD: 351 SECONDS (ref 82–152)
ACT BLD: 395 SECONDS (ref 82–152)
ACT BLD: 428 SECONDS (ref 82–152)

## 2019-10-31 PROCEDURE — 25010000002 NEOSTIGMINE 10 MG/10ML SOLUTION: Performed by: NURSE ANESTHETIST, CERTIFIED REGISTERED

## 2019-10-31 PROCEDURE — 85347 COAGULATION TIME ACTIVATED: CPT

## 2019-10-31 PROCEDURE — C1733 CATH, EP, OTHR THAN COOL-TIP: HCPCS | Performed by: INTERNAL MEDICINE

## 2019-10-31 PROCEDURE — C1769 GUIDE WIRE: HCPCS | Performed by: INTERNAL MEDICINE

## 2019-10-31 PROCEDURE — C1894 INTRO/SHEATH, NON-LASER: HCPCS | Performed by: INTERNAL MEDICINE

## 2019-10-31 PROCEDURE — 25010000002 ONDANSETRON PER 1 MG: Performed by: NURSE ANESTHETIST, CERTIFIED REGISTERED

## 2019-10-31 PROCEDURE — 93613 INTRACARDIAC EPHYS 3D MAPG: CPT | Performed by: INTERNAL MEDICINE

## 2019-10-31 PROCEDURE — C1730 CATH, EP, 19 OR FEW ELECT: HCPCS | Performed by: INTERNAL MEDICINE

## 2019-10-31 PROCEDURE — C1732 CATH, EP, DIAG/ABL, 3D/VECT: HCPCS | Performed by: INTERNAL MEDICINE

## 2019-10-31 PROCEDURE — 93623 PRGRMD STIMJ&PACG IV RX NFS: CPT | Performed by: INTERNAL MEDICINE

## 2019-10-31 PROCEDURE — C1766 INTRO/SHEATH,STRBLE,NON-PEEL: HCPCS | Performed by: INTERNAL MEDICINE

## 2019-10-31 PROCEDURE — 93662 INTRACARDIAC ECG (ICE): CPT | Performed by: INTERNAL MEDICINE

## 2019-10-31 PROCEDURE — 93656 COMPRE EP EVAL ABLTJ ATR FIB: CPT | Performed by: INTERNAL MEDICINE

## 2019-10-31 PROCEDURE — C1759 CATH, INTRA ECHOCARDIOGRAPHY: HCPCS | Performed by: INTERNAL MEDICINE

## 2019-10-31 PROCEDURE — 0 IOPAMIDOL PER 1 ML: Performed by: INTERNAL MEDICINE

## 2019-10-31 PROCEDURE — 25010000002 PROPOFOL 10 MG/ML EMULSION: Performed by: NURSE ANESTHETIST, CERTIFIED REGISTERED

## 2019-10-31 PROCEDURE — 25010000002 PROTAMINE SULFATE PER 10 MG: Performed by: NURSE ANESTHETIST, CERTIFIED REGISTERED

## 2019-10-31 PROCEDURE — 25010000002 DEXAMETHASONE PER 1 MG: Performed by: NURSE ANESTHETIST, CERTIFIED REGISTERED

## 2019-10-31 PROCEDURE — C1893 INTRO/SHEATH, FIXED,NON-PEEL: HCPCS | Performed by: INTERNAL MEDICINE

## 2019-10-31 PROCEDURE — 25010000003 LIDOCAINE 1 % SOLUTION: Performed by: NURSE ANESTHETIST, CERTIFIED REGISTERED

## 2019-10-31 PROCEDURE — 25010000002 HEPARIN (PORCINE) PER 1000 UNITS: Performed by: INTERNAL MEDICINE

## 2019-10-31 PROCEDURE — 25010000002 PHENYLEPHRINE PER 1 ML: Performed by: NURSE ANESTHETIST, CERTIFIED REGISTERED

## 2019-10-31 PROCEDURE — 25010000003 LIDOCAINE 1 % SOLUTION: Performed by: INTERNAL MEDICINE

## 2019-10-31 PROCEDURE — 25010000002 FENTANYL CITRATE (PF) 100 MCG/2ML SOLUTION: Performed by: NURSE ANESTHETIST, CERTIFIED REGISTERED

## 2019-10-31 RX ORDER — SUCRALFATE 1 G/1
1 TABLET ORAL
Status: DISCONTINUED | OUTPATIENT
Start: 2019-10-31 | End: 2019-11-01 | Stop reason: HOSPADM

## 2019-10-31 RX ORDER — PANTOPRAZOLE SODIUM 40 MG/1
40 TABLET, DELAYED RELEASE ORAL
Status: DISCONTINUED | OUTPATIENT
Start: 2019-11-01 | End: 2019-11-01 | Stop reason: HOSPADM

## 2019-10-31 RX ORDER — NEOSTIGMINE METHYLSULFATE 5 MG/5 ML
SYRINGE (ML) INTRAVENOUS AS NEEDED
Status: DISCONTINUED | OUTPATIENT
Start: 2019-10-31 | End: 2019-10-31 | Stop reason: SURG

## 2019-10-31 RX ORDER — DEXAMETHASONE SODIUM PHOSPHATE 4 MG/ML
INJECTION, SOLUTION INTRA-ARTICULAR; INTRALESIONAL; INTRAMUSCULAR; INTRAVENOUS; SOFT TISSUE AS NEEDED
Status: DISCONTINUED | OUTPATIENT
Start: 2019-10-31 | End: 2019-10-31 | Stop reason: SURG

## 2019-10-31 RX ORDER — SODIUM CHLORIDE 9 MG/ML
1 INJECTION, SOLUTION INTRAVENOUS CONTINUOUS
Status: ACTIVE | OUTPATIENT
Start: 2019-10-31 | End: 2019-10-31

## 2019-10-31 RX ORDER — ACETAMINOPHEN 160 MG/5ML
650 SOLUTION ORAL EVERY 4 HOURS PRN
Status: DISCONTINUED | OUTPATIENT
Start: 2019-10-31 | End: 2019-11-01 | Stop reason: HOSPADM

## 2019-10-31 RX ORDER — NITROGLYCERIN 0.4 MG/1
0.4 TABLET SUBLINGUAL
Status: DISCONTINUED | OUTPATIENT
Start: 2019-10-31 | End: 2019-10-31 | Stop reason: HOSPADM

## 2019-10-31 RX ORDER — ONDANSETRON 2 MG/ML
INJECTION INTRAMUSCULAR; INTRAVENOUS AS NEEDED
Status: DISCONTINUED | OUTPATIENT
Start: 2019-10-31 | End: 2019-10-31

## 2019-10-31 RX ORDER — ACETAMINOPHEN 325 MG/1
650 TABLET ORAL EVERY 4 HOURS PRN
Status: DISCONTINUED | OUTPATIENT
Start: 2019-10-31 | End: 2019-10-31 | Stop reason: HOSPADM

## 2019-10-31 RX ORDER — LIDOCAINE HYDROCHLORIDE 10 MG/ML
INJECTION, SOLUTION INFILTRATION; PERINEURAL AS NEEDED
Status: DISCONTINUED | OUTPATIENT
Start: 2019-10-31 | End: 2019-11-01 | Stop reason: HOSPADM

## 2019-10-31 RX ORDER — SODIUM CHLORIDE 0.9 % (FLUSH) 0.9 %
3 SYRINGE (ML) INJECTION EVERY 12 HOURS SCHEDULED
Status: DISCONTINUED | OUTPATIENT
Start: 2019-10-31 | End: 2019-10-31 | Stop reason: HOSPADM

## 2019-10-31 RX ORDER — TOPIRAMATE 25 MG/1
25 CAPSULE, COATED PELLETS ORAL EVERY 12 HOURS SCHEDULED
Status: DISCONTINUED | OUTPATIENT
Start: 2019-10-31 | End: 2019-11-01 | Stop reason: HOSPADM

## 2019-10-31 RX ORDER — PROPOFOL 10 MG/ML
VIAL (ML) INTRAVENOUS AS NEEDED
Status: DISCONTINUED | OUTPATIENT
Start: 2019-10-31 | End: 2019-10-31 | Stop reason: SURG

## 2019-10-31 RX ORDER — PROTAMINE SULFATE 10 MG/ML
INJECTION, SOLUTION INTRAVENOUS AS NEEDED
Status: DISCONTINUED | OUTPATIENT
Start: 2019-10-31 | End: 2019-10-31 | Stop reason: SURG

## 2019-10-31 RX ORDER — SODIUM CHLORIDE 9 MG/ML
INJECTION, SOLUTION INTRAVENOUS CONTINUOUS PRN
Status: COMPLETED | OUTPATIENT
Start: 2019-10-31 | End: 2019-10-31

## 2019-10-31 RX ORDER — SODIUM CHLORIDE 0.9 % (FLUSH) 0.9 %
1-10 SYRINGE (ML) INJECTION AS NEEDED
Status: DISCONTINUED | OUTPATIENT
Start: 2019-10-31 | End: 2019-10-31 | Stop reason: HOSPADM

## 2019-10-31 RX ORDER — HEPARIN SODIUM 1000 [USP'U]/ML
INJECTION, SOLUTION INTRAVENOUS; SUBCUTANEOUS AS NEEDED
Status: DISCONTINUED | OUTPATIENT
Start: 2019-10-31 | End: 2019-11-01 | Stop reason: HOSPADM

## 2019-10-31 RX ORDER — FENTANYL CITRATE 50 UG/ML
50 INJECTION, SOLUTION INTRAMUSCULAR; INTRAVENOUS
Status: DISCONTINUED | OUTPATIENT
Start: 2019-10-31 | End: 2019-10-31 | Stop reason: HOSPADM

## 2019-10-31 RX ORDER — CARVEDILOL 3.12 MG/1
3.12 TABLET ORAL 2 TIMES DAILY
Status: DISCONTINUED | OUTPATIENT
Start: 2019-10-31 | End: 2019-11-01

## 2019-10-31 RX ORDER — ROCURONIUM BROMIDE 10 MG/ML
INJECTION, SOLUTION INTRAVENOUS AS NEEDED
Status: DISCONTINUED | OUTPATIENT
Start: 2019-10-31 | End: 2019-10-31 | Stop reason: SURG

## 2019-10-31 RX ORDER — GLYCOPYRROLATE 0.2 MG/ML
INJECTION INTRAMUSCULAR; INTRAVENOUS AS NEEDED
Status: DISCONTINUED | OUTPATIENT
Start: 2019-10-31 | End: 2019-10-31 | Stop reason: SURG

## 2019-10-31 RX ORDER — ONDANSETRON 2 MG/ML
INJECTION INTRAMUSCULAR; INTRAVENOUS AS NEEDED
Status: DISCONTINUED | OUTPATIENT
Start: 2019-10-31 | End: 2019-10-31 | Stop reason: SURG

## 2019-10-31 RX ORDER — LIDOCAINE HYDROCHLORIDE 10 MG/ML
INJECTION, SOLUTION INFILTRATION; PERINEURAL AS NEEDED
Status: DISCONTINUED | OUTPATIENT
Start: 2019-10-31 | End: 2019-10-31 | Stop reason: SURG

## 2019-10-31 RX ORDER — SODIUM CHLORIDE, SODIUM LACTATE, POTASSIUM CHLORIDE, CALCIUM CHLORIDE 600; 310; 30; 20 MG/100ML; MG/100ML; MG/100ML; MG/100ML
INJECTION, SOLUTION INTRAVENOUS CONTINUOUS PRN
Status: DISCONTINUED | OUTPATIENT
Start: 2019-10-31 | End: 2019-10-31 | Stop reason: SURG

## 2019-10-31 RX ORDER — PROMETHAZINE HYDROCHLORIDE 25 MG/ML
12.5 INJECTION, SOLUTION INTRAMUSCULAR; INTRAVENOUS EVERY 4 HOURS PRN
Status: DISCONTINUED | OUTPATIENT
Start: 2019-10-31 | End: 2019-10-31 | Stop reason: HOSPADM

## 2019-10-31 RX ORDER — KETOROLAC TROMETHAMINE 30 MG/ML
15 INJECTION, SOLUTION INTRAMUSCULAR; INTRAVENOUS EVERY 8 HOURS
Status: DISCONTINUED | OUTPATIENT
Start: 2019-10-31 | End: 2019-11-01 | Stop reason: HOSPADM

## 2019-10-31 RX ORDER — ACETAMINOPHEN 650 MG/1
650 SUPPOSITORY RECTAL EVERY 4 HOURS PRN
Status: DISCONTINUED | OUTPATIENT
Start: 2019-10-31 | End: 2019-11-01 | Stop reason: HOSPADM

## 2019-10-31 RX ORDER — ONDANSETRON 2 MG/ML
4 INJECTION INTRAMUSCULAR; INTRAVENOUS ONCE AS NEEDED
Status: COMPLETED | OUTPATIENT
Start: 2019-10-31 | End: 2019-10-31

## 2019-10-31 RX ORDER — ACETAMINOPHEN 325 MG/1
650 TABLET ORAL EVERY 4 HOURS PRN
Status: DISCONTINUED | OUTPATIENT
Start: 2019-10-31 | End: 2019-11-01 | Stop reason: HOSPADM

## 2019-10-31 RX ADMIN — FENTANYL CITRATE 50 MCG: 0.05 INJECTION, SOLUTION INTRAMUSCULAR; INTRAVENOUS at 16:20

## 2019-10-31 RX ADMIN — GLYCOPYRROLATE 0.4 MG: 0.2 INJECTION, SOLUTION INTRAMUSCULAR; INTRAVENOUS at 13:44

## 2019-10-31 RX ADMIN — ONDANSETRON 4 MG: 2 INJECTION INTRAMUSCULAR; INTRAVENOUS at 16:10

## 2019-10-31 RX ADMIN — EPHEDRINE SULFATE 20 MG: 50 INJECTION INTRAMUSCULAR; INTRAVENOUS; SUBCUTANEOUS at 12:53

## 2019-10-31 RX ADMIN — PROTAMINE SULFATE 50 MG: 10 INJECTION, SOLUTION INTRAVENOUS at 15:30

## 2019-10-31 RX ADMIN — CARVEDILOL 3.12 MG: 3.12 TABLET, FILM COATED ORAL at 21:41

## 2019-10-31 RX ADMIN — PROPOFOL 150 MG: 10 INJECTION, EMULSION INTRAVENOUS at 12:39

## 2019-10-31 RX ADMIN — SODIUM CHLORIDE 1000 ML: 9 INJECTION, SOLUTION INTRAVENOUS at 16:22

## 2019-10-31 RX ADMIN — SUCRALFATE 1 G: 1 TABLET ORAL at 21:41

## 2019-10-31 RX ADMIN — GLYCOPYRROLATE 0.2 MG: 0.2 INJECTION, SOLUTION INTRAMUSCULAR; INTRAVENOUS at 13:02

## 2019-10-31 RX ADMIN — SODIUM CHLORIDE, POTASSIUM CHLORIDE, SODIUM LACTATE AND CALCIUM CHLORIDE: 600; 310; 30; 20 INJECTION, SOLUTION INTRAVENOUS at 12:29

## 2019-10-31 RX ADMIN — FENTANYL CITRATE 50 MCG: 0.05 INJECTION, SOLUTION INTRAMUSCULAR; INTRAVENOUS at 16:35

## 2019-10-31 RX ADMIN — SODIUM CHLORIDE 1 ML/KG/HR: 9 INJECTION, SOLUTION INTRAVENOUS at 09:01

## 2019-10-31 RX ADMIN — ONDANSETRON 4 MG: 2 INJECTION INTRAMUSCULAR; INTRAVENOUS at 15:25

## 2019-10-31 RX ADMIN — DEXAMETHASONE SODIUM PHOSPHATE 8 MG: 4 INJECTION, SOLUTION INTRAMUSCULAR; INTRAVENOUS at 12:52

## 2019-10-31 RX ADMIN — EPHEDRINE SULFATE 20 MG: 50 INJECTION INTRAMUSCULAR; INTRAVENOUS; SUBCUTANEOUS at 12:58

## 2019-10-31 RX ADMIN — Medication 3 MG: at 13:44

## 2019-10-31 RX ADMIN — PHENYLEPHRINE HYDROCHLORIDE 100 MCG: 10 INJECTION INTRAVENOUS at 13:40

## 2019-10-31 RX ADMIN — LIDOCAINE HYDROCHLORIDE 50 MG: 10 INJECTION, SOLUTION INFILTRATION; PERINEURAL at 12:39

## 2019-10-31 RX ADMIN — TOPIRAMATE 25 MG: 25 CAPSULE, COATED PELLETS ORAL at 21:48

## 2019-10-31 RX ADMIN — ROCURONIUM BROMIDE 50 MG: 10 SOLUTION INTRAVENOUS at 12:39

## 2019-10-31 RX ADMIN — PHENYLEPHRINE HYDROCHLORIDE 0.3 MCG/KG/MIN: 10 INJECTION INTRAVENOUS at 13:40

## 2019-10-31 NOTE — ANESTHESIA PROCEDURE NOTES
Airway  Urgency: elective    Date/Time: 10/31/2019 12:40 PM  Airway not difficult    General Information and Staff    Patient location during procedure: OR  CRNA: Kym Salgado CRNA    Indications and Patient Condition  Indications for airway management: airway protection    Preoxygenated: yes  MILS not maintained throughout  Mask difficulty assessment: 1 - vent by mask    Final Airway Details  Final airway type: endotracheal airway      Successful airway: ETT  Cuffed: yes   Successful intubation technique: direct laryngoscopy  Facilitating devices/methods: intubating stylet  Endotracheal tube insertion site: oral  Blade: [place]  Blade size: 2  ETT size (mm): 7.0  Cormack-Lehane Classification: grade I - full view of glottis  Placement verified by: chest auscultation and capnometry   Measured from: lips  ETT/EBT  to lips (cm): 20  Number of attempts at approach: 1    Additional Comments  Negative epigastric sounds, Breath sound equal bilaterally with symmetric chest rise and fall.  Teeth intact, atraumatic

## 2019-10-31 NOTE — ANESTHESIA PREPROCEDURE EVALUATION
Anesthesia Evaluation     Patient summary reviewed and Nursing notes reviewed   NPO Solid Status: > 8 hours  NPO Liquid Status: > 8 hours           Airway   Mallampati: I  TM distance: >3 FB  Neck ROM: full  No difficulty expected  Dental      Pulmonary    (+) shortness of breath,   (-) COPD, asthma, recent URI, not a smoker, no home oxygen    ROS comment: pulm HTN   Cardiovascular     ECG reviewed    (+) dysrhythmias Paroxysmal Atrial Fib, angina (with PAF ),   (-) hypertension, valvular problems/murmurs (echo essentiually  normal ), past MI, CAD, hyperlipidemia    ROS comment: EKG A Fib RVR lat MI?vs same day EKG SB (post CV?)    ECHO Mild TVR   EF = 60%.    GXT 2018  EF 69%.normal perfusion study. No ischemia.  Low risk study.Normal ECG stress test.    Neuro/Psych  (+) headaches (migraine ), syncope,     (-) seizures, CVA  GI/Hepatic/Renal/Endo    (-) liver disease, no renal disease (creatnine normal ), diabetes, no thyroid disorder    Musculoskeletal     Abdominal    Substance History      OB/GYN          Other   arthritis,      ROS/Med Hx Other: eliquis   tachy clementina syndrome  HA after GA                  Anesthesia Plan    ASA 3     general     intravenous induction   Anesthetic plan, all risks, benefits, and alternatives have been provided, discussed and informed consent has been obtained with: patient.    Plan discussed with CRNA.

## 2019-10-31 NOTE — ANESTHESIA POSTPROCEDURE EVALUATION
Patient: Kiesha Dempsey    Procedure Summary     Date:  10/31/19 Room / Location:  [facility] / [facility]IVE [facility]    Anesthesia Start:  1229 Anesthesia Stop:  1553    Procedure:  Ablation atrial fibrillation, hold metoprolol 3-5 days prior (N/A ) Diagnosis:       Paroxysmal atrial fibrillation (CMS/HCC)      (afib)    Provider:  Bruce Rodriguez MD Provider:  Malick Pizarro MD    Anesthesia Type:  general ASA Status:  3          Anesthesia Type: general  Last vitals  BP   100/64   Temp   97.0   Pulse   89   Resp   16     SpO2   100     Post Anesthesia Care and Evaluation    Patient location during evaluation: PACU  Patient participation: complete - patient participated  Level of consciousness: sleepy but conscious  Pain score: 0  Pain management: adequate  Airway patency: patent  Anesthetic complications: No anesthetic complications  PONV Status: none  Cardiovascular status: hemodynamically stable and acceptable  Respiratory status: nonlabored ventilation, acceptable, nasal cannula and oral airway  Hydration status: acceptable

## 2019-11-01 VITALS
DIASTOLIC BLOOD PRESSURE: 67 MMHG | HEIGHT: 64 IN | OXYGEN SATURATION: 97 % | BODY MASS INDEX: 19.9 KG/M2 | SYSTOLIC BLOOD PRESSURE: 104 MMHG | RESPIRATION RATE: 18 BRPM | HEART RATE: 75 BPM | TEMPERATURE: 97.5 F | WEIGHT: 116.56 LBS

## 2019-11-01 LAB
ANION GAP SERPL CALCULATED.3IONS-SCNC: 8 MMOL/L (ref 5–15)
BASOPHILS # BLD AUTO: 0.01 10*3/MM3 (ref 0–0.2)
BASOPHILS NFR BLD AUTO: 0.1 % (ref 0–1.5)
BUN BLD-MCNC: 16 MG/DL (ref 8–23)
BUN/CREAT SERPL: 19.8 (ref 7–25)
CALCIUM SPEC-SCNC: 7.8 MG/DL (ref 8.6–10.5)
CHLORIDE SERPL-SCNC: 110 MMOL/L (ref 98–107)
CO2 SERPL-SCNC: 23 MMOL/L (ref 22–29)
CREAT BLD-MCNC: 0.81 MG/DL (ref 0.57–1)
DEPRECATED RDW RBC AUTO: 43.2 FL (ref 37–54)
EOSINOPHIL # BLD AUTO: 0 10*3/MM3 (ref 0–0.4)
EOSINOPHIL NFR BLD AUTO: 0 % (ref 0.3–6.2)
ERYTHROCYTE [DISTWIDTH] IN BLOOD BY AUTOMATED COUNT: 11.9 % (ref 12.3–15.4)
GFR SERPL CREATININE-BSD FRML MDRD: 71 ML/MIN/1.73
GLUCOSE BLD-MCNC: 97 MG/DL (ref 65–99)
HCT VFR BLD AUTO: 35.3 % (ref 34–46.6)
HCT VFR BLD AUTO: 37.5 % (ref 34–46.6)
HGB BLD-MCNC: 11.2 G/DL (ref 12–15.9)
HGB BLD-MCNC: 12 G/DL (ref 12–15.9)
IMM GRANULOCYTES # BLD AUTO: 0.03 10*3/MM3 (ref 0–0.05)
IMM GRANULOCYTES NFR BLD AUTO: 0.3 % (ref 0–0.5)
LYMPHOCYTES # BLD AUTO: 0.94 10*3/MM3 (ref 0.7–3.1)
LYMPHOCYTES NFR BLD AUTO: 10.9 % (ref 19.6–45.3)
MCH RBC QN AUTO: 31.2 PG (ref 26.6–33)
MCHC RBC AUTO-ENTMCNC: 31.7 G/DL (ref 31.5–35.7)
MCV RBC AUTO: 98.3 FL (ref 79–97)
MONOCYTES # BLD AUTO: 0.73 10*3/MM3 (ref 0.1–0.9)
MONOCYTES NFR BLD AUTO: 8.5 % (ref 5–12)
NEUTROPHILS # BLD AUTO: 6.91 10*3/MM3 (ref 1.7–7)
NEUTROPHILS NFR BLD AUTO: 80.2 % (ref 42.7–76)
NRBC BLD AUTO-RTO: 0 /100 WBC (ref 0–0.2)
PLATELET # BLD AUTO: 137 10*3/MM3 (ref 140–450)
PMV BLD AUTO: 10.4 FL (ref 6–12)
POTASSIUM BLD-SCNC: 4.3 MMOL/L (ref 3.5–5.2)
RBC # BLD AUTO: 3.59 10*6/MM3 (ref 3.77–5.28)
SODIUM BLD-SCNC: 141 MMOL/L (ref 136–145)
WBC NRBC COR # BLD: 8.62 10*3/MM3 (ref 3.4–10.8)

## 2019-11-01 PROCEDURE — 93010 ELECTROCARDIOGRAM REPORT: CPT | Performed by: INTERNAL MEDICINE

## 2019-11-01 PROCEDURE — 80048 BASIC METABOLIC PNL TOTAL CA: CPT | Performed by: INTERNAL MEDICINE

## 2019-11-01 PROCEDURE — 93005 ELECTROCARDIOGRAM TRACING: CPT | Performed by: INTERNAL MEDICINE

## 2019-11-01 PROCEDURE — 85025 COMPLETE CBC W/AUTO DIFF WBC: CPT | Performed by: INTERNAL MEDICINE

## 2019-11-01 PROCEDURE — 85014 HEMATOCRIT: CPT | Performed by: NURSE PRACTITIONER

## 2019-11-01 PROCEDURE — 99214 OFFICE O/P EST MOD 30 MIN: CPT | Performed by: INTERNAL MEDICINE

## 2019-11-01 PROCEDURE — 85018 HEMOGLOBIN: CPT | Performed by: NURSE PRACTITIONER

## 2019-11-01 RX ORDER — SUCRALFATE 1 G/1
1 TABLET ORAL
Qty: 63 TABLET | Refills: 0 | Status: SHIPPED | OUTPATIENT
Start: 2019-11-01 | End: 2019-11-22

## 2019-11-01 RX ORDER — PANTOPRAZOLE SODIUM 40 MG/1
40 TABLET, DELAYED RELEASE ORAL DAILY
Qty: 30 TABLET | Refills: 0 | Status: SHIPPED | OUTPATIENT
Start: 2019-11-01 | End: 2019-12-01

## 2019-11-01 RX ADMIN — SUCRALFATE 1 G: 1 TABLET ORAL at 09:07

## 2019-11-01 RX ADMIN — PANTOPRAZOLE SODIUM 40 MG: 40 TABLET, DELAYED RELEASE ORAL at 05:23

## 2019-11-01 RX ADMIN — SUCRALFATE 1 G: 1 TABLET ORAL at 11:50

## 2019-11-01 RX ADMIN — SODIUM CHLORIDE 250 ML: 9 INJECTION, SOLUTION INTRAVENOUS at 03:43

## 2019-11-01 RX ADMIN — APIXABAN 5 MG: 5 TABLET, FILM COATED ORAL at 09:07

## 2019-11-01 RX ADMIN — SODIUM CHLORIDE 500 ML: 9 INJECTION, SOLUTION INTRAVENOUS at 09:06

## 2019-11-01 NOTE — PROGRESS NOTES
Continued Stay Note  JIN Beckford     Patient Name: Kiesha Dempsey  MRN: [id number]  Today's Date: 11/1/2019    Admit Date: 10/31/2019    Discharge Plan     Row Name 11/01/19 1112       Plan    Plan  Home    Patient/Family in Agreement with Plan  yes    Plan Comments  Spoke with patient at bedside regarding discharge planning.  Patient reports that she has used HH in 2015 after having a Right Hip Replacement but does not remember what agency and denies use of DME.  Patient reports that she has prescription coverage.  Patient lives with her  in a single level house with 1 step to access and denies concenrs regarding home safety.  No discharge needs verbalized.  CM following.  Patient plan is to discharge home via car with family to transport when medically ready.     Final Discharge Disposition Code  01 - home or self-care        Discharge Codes    No documentation.       Expected Discharge Date and Time     Expected Discharge Date Expected Discharge Time    Nov 2, 2019             Mckenna Gaviria RN

## 2019-11-01 NOTE — DISCHARGE INSTR - APPOINTMENTS
You have an appointment with Loren Gusman APRN  on November 6, 2019 @ 1:15 PM.   Call them if you have any questions. Phone: [phone]  [address] [place] [address]

## 2019-11-01 NOTE — NURSING NOTE
Post 500 ml normal saline bolus, BP remains 85/51 after ambulating to bathroom. Patient states she has been unable to tolerate beta blockers in the past due to hypotension. Patient has remained asymptomatic        0630: Bp remains 80/50. Lynn consulted again. STAT labs and a second bolus ordered. Patient remains asymptomatic

## 2019-11-01 NOTE — SIGNIFICANT NOTE
Patient states she takes Eliquis at home. Dr Jimenez did not continue Eliquis post ablation. Will address in the morning prior to discharge.

## 2019-11-01 NOTE — PROGRESS NOTES
[facility] at [facility]  Progress Note     LOS: 0 days   Patient Care Team:  Tyler Rucker MD as PCP - General    Chief Complaint:  Follow up afib    Subjective     No CP or SOB. Feels well. Has been up walking around. Her BP has been low since she was given Coreg last night. States that her BP has been low at home.  Overall feels well.  No significant tach palpitations.        Review of Systems:   Pertinent positives in HPI, all others reviewed and negative.      Objective       Current Facility-Administered Medications:   •  acetaminophen (TYLENOL) tablet 650 mg, 650 mg, Oral, Q4H PRN **OR** acetaminophen (TYLENOL) 160 MG/5ML solution 650 mg, 650 mg, Oral, Q4H PRN **OR** acetaminophen (TYLENOL) suppository 650 mg, 650 mg, Rectal, Q4H PRN, Bruce Rodriguez MD  •  apixaban (ELIQUIS) tablet 5 mg, 5 mg, Oral, Q12H, Ester Mayer PA  •  carvedilol (COREG) tablet 3.125 mg, 3.125 mg, Oral, BID, Shakila Cobb, APRN, 3.125 mg at 10/31/19 2141  •  heparin (porcine) injection, , , PRN, Bruce Rodriguez MD, 1,000 Units at 10/31/19 1354  •  iopamidol (ISOVUE-370) 76 % injection, , , PRN, Bruce Rodriguez MD, 85 mL at 10/31/19 1532  •  ketorolac (TORADOL) injection 15 mg, 15 mg, Intravenous, Q8H, Bruce Rodriguez MD, Stopped at 11/01/19 0344  •  lidocaine (XYLOCAINE) 1 % injection, , , PRN, Bruce Rodriguez MD, 10 mL at 10/31/19 1322  •  pantoprazole (PROTONIX) EC tablet 40 mg, 40 mg, Oral, Q AM, Bruce Rodriguez MD, 40 mg at 11/01/19 0523  •  sodium chloride 0.9 % bolus 500 mL, 500 mL, Intravenous, Once, Mónica Bailey MD  •  sucralfate (CARAFATE) tablet 1 g, 1 g, Oral, 4x Daily AC & at Bedtime, Bruce Rodriguez MD, 1 g at 10/31/19 2141  •  topiramate (TOPAMAX) capsule 25 mg, 25 mg, Oral, Q12H, Shakila Cobb APRN, 25 mg at 10/31/19 2148    Vital Sign Min/Max for last 24 hours  Temp  Min: 97 °F (36.1 °C)  Max: 98.4 °F (36.9 °C)   BP  Min: 78/51  Max:  "116/78   Pulse  Min: 52  Max: 97   Resp  Min: 12  Max: 22   SpO2  Min: 90 %  Max: 100 %   Flow (L/min)  Min: 2  Max: 4   Weight  Min: 52.9 kg (116 lb 9 oz)  Max: 52.9 kg (116 lb 9 oz)     Flowsheet Rows      First Filed Value   Admission Height  162.6 cm (64\") Documented at 10/31/2019 0845   Admission Weight  52.9 kg (116 lb 9 oz) Documented at 10/31/2019 0845        No intake or output data in the 24 hours ending 11/01/19 0802    Physical Exam:     General Appearance:    Alert, cooperative, in no acute distress   Lungs:    Clear to auscultation bilaterally.  Respiratory effort normal.    Heart:   Regular rate rhythm normal S1-S2.  No S3-S4 murmurs.   Chest Wall:    No abnormalities observed   Abdomen:     Normal bowel sounds, no masses,  soft  non-tender, non-distended, no guarding, no rebound tenderness   Extremities:   Moves all extremities well, no edema, no cyanosis, no             redness   Pulses:   Pulses palpable and equal bilaterally   Skin:   Groin and neck sites are clean, dry and intact. No redness, swelling or drainage. Right groin suture removed without complication.  Venous access sites normal.        Results Review:   Results from last 7 days   Lab Units 10/30/19  0927   WBC 10*3/mm3 3.96   HEMOGLOBIN g/dL 13.6   HEMATOCRIT % 42.4   PLATELETS 10*3/mm3 171     Results from last 7 days   Lab Units 10/30/19  0927   SODIUM mmol/L 142   POTASSIUM mmol/L 4.4   CHLORIDE mmol/L 107   CO2 mmol/L 28.0   BUN mg/dL 19   CREATININE mg/dL 0.80   GLUCOSE mg/dL 86      Results from last 7 days   Lab Units 10/30/19  0927   HEMOGLOBIN A1C % 5.40         Results from last 7 days   Lab Units 10/30/19  0927   TSH uIU/mL 4.050         Results from last 7 days   Lab Units 10/30/19  0927   PROTIME Seconds 16.7*   INR  1.42*         No intake or output data in the 24 hours ending 11/01/19 0802    I personally viewed and interpreted the patient's EKG/Telemetry data    EKG: NSR 78 bpm normal QRS, QTc, , and normal MO " interval.    Telemetry: NSR 52-97 bpm, no atrial fibrillation.    Present on Admission:  **None**    Assessment/Plan     1. Paroxysmal Atrial Fibrillation S/p Pulmonary Vein ablation with cryoablation. Now in NSR. Will rx: Carafate suspension 1 gm TID x 7 days and Protonix 40 mg PO daily 30 days. Will follow up in 4 weeks in the Afib Clinic and in 12 weeks with Dr. Rodriguez. Has had some low BPs overnight after receiving Coreg. Improved with 1 L NS. CBC pending this AM. Patient feels well with no symptoms and Bilateral groin sites WNL with no bleeding.   Will d/c coreg due to hypotension. Her BPs have been running low at home. Clinically improved from an atrial fibrillation standpoint.  2. Anticoagulation: PT will  Continue Eliquis. CHADSvasc = 2        Plan for disposition:The patient is stable and will be discharged to home  today     Electronically signed by REINA Guillermo, 11/01/19, 7:52 AM.      I, Bruce Rodriguez MD, personally performed the services face to face as described and documented by the above named individual. I have made any necessary edits and it is both accurate and complete 11/1/2019  8:21 AM

## 2019-11-01 NOTE — NURSING NOTE
Ablation Nurse Navigator    Pt s/p cryoPVA yesterday. Doing well this AM. Reports sore throat. NSR on Tele.  HR 77.  BP 93/62.  Low Bps last night after coreg.  CBC pending.  Has had breakfast without nausea, has voided without difficulty and has ambulated without dizziness or groin issue.  Going home later today.        Atrial fibrillation / Atrial flutter:  Quality Measure    CHADS-VASc Score:2    Anticoagulation for CHADS-VASc >/=2 on eliquis 5 mg bid     Statin Therapy no h/o HLP     Reviewed D/C information with patient, particularly normal post procedural expectations and when to call.  Ablation discharge instruction handout left for future reference.  Verbalized understanding.  Will follow up in the AFib Clinic in one month and with Dr. Rodriguez in 3 months.  I gave them my contact information and encouraged to call me with questions or concerns in the interim.    Estrella Wiggins RN      11/01/19  8:33 AM

## 2019-11-01 NOTE — PLAN OF CARE
Problem: Patient Care Overview  Goal: Plan of Care Review  Outcome: Ongoing (interventions implemented as appropriate)      11/01/19 0411   Coping/Psychosocial   Plan of Care Reviewed With patient   Plan of Care Review   Progress improving   OTHER   Outcome Summary groins remain soft and dry. Coreg administered at evening med pass. Blood pressure stayed in the 80's/50's for couple hours. Lynn consulted and saline bolus initiated. Patient has been up ambulating and has not been symptomatic with hypotension. bilateral       Problem: Pain, Chronic (Adult)  Goal: Identify Related Risk Factors and Signs and Symptoms  Outcome: Ongoing (interventions implemented as appropriate)   11/01/19 0411   Pain, Chronic (Adult)   Related Risk Factors (Chronic Pain) (migraines)   Signs and Symptoms (Chronic Pain) social withdrawal

## 2019-11-01 NOTE — PROGRESS NOTES
Case Management Discharge Note    Final Note: Spoke with patient and  at bedside regarding discharge plan.  Patient has orders to go home.  No discharge needs verbalized.  Patient plan is to discharge home today via car with family to transport.     Destination      No service has been selected for the patient.      Durable Medical Equipment      No service has been selected for the patient.      Dialysis/Infusion      No service has been selected for the patient.      Home Medical Care      No service has been selected for the patient.      Therapy      No service has been selected for the patient.      Community Resources      No service has been selected for the patient.             Final Discharge Disposition Code: 01 - home or self-care

## 2019-11-01 NOTE — PROGRESS NOTES
Discharge Planning Assessment  [facility]     Patient Name: Kiesha Dempsey  MRN: [id number]  Today's Date: 11/1/2019    Admit Date: 10/31/2019    Discharge Needs Assessment     Row Name 11/01/19 1109       Living Environment    Lives With  spouse    Name(s) of Who Lives With Patient  Tyler Rucker MD    Current Living Arrangements  home/apartment/condo    Primary Care Provided by  self;spouse/significant other    Provides Primary Care For  no one    Family Caregiver if Needed  spouse    Family Caregiver Names  Wale Dempsey    Quality of Family Relationships  unable to assess    Able to Return to Prior Arrangements  no       Resource/Environmental Concerns    Resource/Environmental Concerns  none       Transition Planning    Patient/Family Anticipates Transition to  home with family    Patient/Family Anticipated Services at Transition  none    Transportation Anticipated  family or friend will provide       Discharge Needs Assessment    Readmission Within the Last 30 Days  no previous admission in last 30 days    Concerns to be Addressed  no discharge needs identified;denies needs/concerns at this time    Equipment Currently Used at Home  none    Anticipated Changes Related to Illness  none    Equipment Needed After Discharge  none    Offered/Gave Vendor List  yes    Discharge Coordination/Progress  Home        Discharge Plan     Row Name 11/01/19 1112       Plan    Plan  Home    Patient/Family in Agreement with Plan  yes    Plan Comments  Spoke with patient at bedside regarding discharge planning.  Patient reports that she has used HH in 2015 after having a Right Hip Replacement but does not remember what agency and denies use of DME.  Patient reports that she has prescription coverage.  Patient lives with her  in a single level house with 1 step to access and denies concenrs regarding home safety.  No discharge needs verbalized.  CM following.  Patient plan is to discharge home via car with family to  transport when medically ready.     Final Discharge Disposition Code  01 - home or self-care        Destination      No service coordination in this encounter.      Durable Medical Equipment      No service coordination in this encounter.      Dialysis/Infusion      No service coordination in this encounter.      Home Medical Care      No service coordination in this encounter.      Therapy      No service coordination in this encounter.      Community Resources      No service coordination in this encounter.        Expected Discharge Date and Time     Expected Discharge Date Expected Discharge Time    Nov 2, 2019         Demographic Summary     Row Name 11/01/19 1105       General Information    Admission Type  same day    Arrived From  home    Referral Source  admission list    Reason for Consult  discharge planning    Preferred Language  English     Used During This Interaction  no    General Information Comments  Tyler Rucker MD       Contact Information    Permission Granted to Share Info With      Contact Information Obtained for      Contact Information Comments  Wale Dempsey, spouse  [phone]        Functional Status     Row Name 11/01/19 1106       Functional Status    Usual Activity Tolerance  good    Current Activity Tolerance  good       Functional Status, IADL    Medications  independent    Meal Preparation  independent    Housekeeping  independent    Laundry  independent    Shopping  independent       Employment/    Employment/ Comments  Medicare/[employer] Med Supp           Current or Previous  Service  none        Psychosocial    No documentation.       Abuse/Neglect    No documentation.       Legal    No documentation.       Substance Abuse    No documentation.       Patient Forms    No documentation.           Mckenna Gaviria, RN

## 2019-11-03 ENCOUNTER — HOSPITAL ENCOUNTER (EMERGENCY)
Facility: HOSPITAL | Age: 64
Discharge: HOME | End: 2019-11-03
Attending: EMERGENCY MEDICINE | Admitting: EMERGENCY MEDICINE

## 2019-11-03 ENCOUNTER — APPOINTMENT (OUTPATIENT)
Dept: CT IMAGING | Facility: HOSPITAL | Age: 64
End: 2019-11-03

## 2019-11-03 VITALS
SYSTOLIC BLOOD PRESSURE: 133 MMHG | RESPIRATION RATE: 16 BRPM | TEMPERATURE: 98.3 F | DIASTOLIC BLOOD PRESSURE: 72 MMHG | WEIGHT: 124 LBS | BODY MASS INDEX: 21.17 KG/M2 | HEART RATE: 70 BPM | OXYGEN SATURATION: 97 % | HEIGHT: 64 IN

## 2019-11-03 DIAGNOSIS — R10.30 LOWER ABDOMINAL PAIN: Primary | ICD-10-CM

## 2019-11-03 DIAGNOSIS — S30.1XXA CONTUSION OF RIGHT GROIN, INITIAL ENCOUNTER: ICD-10-CM

## 2019-11-03 DIAGNOSIS — G89.18 POST-OPERATIVE PAIN: ICD-10-CM

## 2019-11-03 LAB
ALBUMIN SERPL-MCNC: 3.9 G/DL (ref 3.5–5.2)
ALBUMIN/GLOB SERPL: 1.3 G/DL
ALP SERPL-CCNC: 63 U/L (ref 39–117)
ALT SERPL W P-5'-P-CCNC: 15 U/L (ref 1–33)
ANION GAP SERPL CALCULATED.3IONS-SCNC: 8 MMOL/L (ref 5–15)
AST SERPL-CCNC: 29 U/L (ref 1–32)
BASOPHILS # BLD AUTO: 0.02 10*3/MM3 (ref 0–0.2)
BASOPHILS NFR BLD AUTO: 0.4 % (ref 0–1.5)
BILIRUB SERPL-MCNC: 0.5 MG/DL (ref 0.2–1.2)
BILIRUB UR QL STRIP: NEGATIVE
BUN BLD-MCNC: 10 MG/DL (ref 8–23)
BUN/CREAT SERPL: 13.5 (ref 7–25)
CALCIUM SPEC-SCNC: 9 MG/DL (ref 8.6–10.5)
CHLORIDE SERPL-SCNC: 107 MMOL/L (ref 98–107)
CLARITY UR: CLEAR
CO2 SERPL-SCNC: 27 MMOL/L (ref 22–29)
COLOR UR: YELLOW
CREAT BLD-MCNC: 0.74 MG/DL (ref 0.57–1)
DEPRECATED RDW RBC AUTO: 42.3 FL (ref 37–54)
EOSINOPHIL # BLD AUTO: 0.11 10*3/MM3 (ref 0–0.4)
EOSINOPHIL NFR BLD AUTO: 2.4 % (ref 0.3–6.2)
ERYTHROCYTE [DISTWIDTH] IN BLOOD BY AUTOMATED COUNT: 11.8 % (ref 12.3–15.4)
GFR SERPL CREATININE-BSD FRML MDRD: 79 ML/MIN/1.73
GLOBULIN UR ELPH-MCNC: 2.9 GM/DL
GLUCOSE BLD-MCNC: 99 MG/DL (ref 65–99)
GLUCOSE UR STRIP-MCNC: NEGATIVE MG/DL
HCT VFR BLD AUTO: 38.5 % (ref 34–46.6)
HGB BLD-MCNC: 12.1 G/DL (ref 12–15.9)
HGB UR QL STRIP.AUTO: NEGATIVE
HOLD SPECIMEN: NORMAL
HOLD SPECIMEN: NORMAL
IMM GRANULOCYTES # BLD AUTO: 0.01 10*3/MM3 (ref 0–0.05)
IMM GRANULOCYTES NFR BLD AUTO: 0.2 % (ref 0–0.5)
INR PPP: 1.19 (ref 0.85–1.16)
KETONES UR QL STRIP: NEGATIVE
LEUKOCYTE ESTERASE UR QL STRIP.AUTO: NEGATIVE
LIPASE SERPL-CCNC: 34 U/L (ref 13–60)
LYMPHOCYTES # BLD AUTO: 1.51 10*3/MM3 (ref 0.7–3.1)
LYMPHOCYTES NFR BLD AUTO: 32.5 % (ref 19.6–45.3)
MCH RBC QN AUTO: 30.6 PG (ref 26.6–33)
MCHC RBC AUTO-ENTMCNC: 31.4 G/DL (ref 31.5–35.7)
MCV RBC AUTO: 97.5 FL (ref 79–97)
MONOCYTES # BLD AUTO: 0.45 10*3/MM3 (ref 0.1–0.9)
MONOCYTES NFR BLD AUTO: 9.7 % (ref 5–12)
NEUTROPHILS # BLD AUTO: 2.55 10*3/MM3 (ref 1.7–7)
NEUTROPHILS NFR BLD AUTO: 54.8 % (ref 42.7–76)
NITRITE UR QL STRIP: NEGATIVE
NRBC BLD AUTO-RTO: 0 /100 WBC (ref 0–0.2)
PH UR STRIP.AUTO: 7.5 [PH] (ref 5–8)
PLATELET # BLD AUTO: 146 10*3/MM3 (ref 140–450)
PMV BLD AUTO: 9.9 FL (ref 6–12)
POTASSIUM BLD-SCNC: 3.8 MMOL/L (ref 3.5–5.2)
PROT SERPL-MCNC: 6.8 G/DL (ref 6–8.5)
PROT UR QL STRIP: NEGATIVE
PROTHROMBIN TIME: 14.6 SECONDS (ref 11.2–14.3)
RBC # BLD AUTO: 3.95 10*6/MM3 (ref 3.77–5.28)
SODIUM BLD-SCNC: 142 MMOL/L (ref 136–145)
SP GR UR STRIP: 1.02 (ref 1–1.03)
UROBILINOGEN UR QL STRIP: NORMAL
WBC NRBC COR # BLD: 4.65 10*3/MM3 (ref 3.4–10.8)
WHOLE BLOOD HOLD SPECIMEN: NORMAL
WHOLE BLOOD HOLD SPECIMEN: NORMAL

## 2019-11-03 PROCEDURE — 75635 CT ANGIO ABDOMINAL ARTERIES: CPT

## 2019-11-03 PROCEDURE — 99282 EMERGENCY DEPT VISIT SF MDM: CPT

## 2019-11-03 PROCEDURE — 85025 COMPLETE CBC W/AUTO DIFF WBC: CPT | Performed by: EMERGENCY MEDICINE

## 2019-11-03 PROCEDURE — 83690 ASSAY OF LIPASE: CPT | Performed by: EMERGENCY MEDICINE

## 2019-11-03 PROCEDURE — 0 IOPAMIDOL PER 1 ML: Performed by: EMERGENCY MEDICINE

## 2019-11-03 PROCEDURE — 99283 EMERGENCY DEPT VISIT LOW MDM: CPT

## 2019-11-03 PROCEDURE — 81003 URINALYSIS AUTO W/O SCOPE: CPT | Performed by: EMERGENCY MEDICINE

## 2019-11-03 PROCEDURE — 80053 COMPREHEN METABOLIC PANEL: CPT | Performed by: EMERGENCY MEDICINE

## 2019-11-03 PROCEDURE — 85610 PROTHROMBIN TIME: CPT | Performed by: EMERGENCY MEDICINE

## 2019-11-03 RX ORDER — HYDROMORPHONE HYDROCHLORIDE 1 MG/ML
0.5 INJECTION, SOLUTION INTRAMUSCULAR; INTRAVENOUS; SUBCUTANEOUS ONCE
Status: DISCONTINUED | OUTPATIENT
Start: 2019-11-03 | End: 2019-11-04 | Stop reason: HOSPADM

## 2019-11-03 RX ORDER — ONDANSETRON 2 MG/ML
4 INJECTION INTRAMUSCULAR; INTRAVENOUS ONCE
Status: DISCONTINUED | OUTPATIENT
Start: 2019-11-03 | End: 2019-11-04 | Stop reason: HOSPADM

## 2019-11-03 RX ORDER — SODIUM CHLORIDE 0.9 % (FLUSH) 0.9 %
10 SYRINGE (ML) INJECTION AS NEEDED
Status: DISCONTINUED | OUTPATIENT
Start: 2019-11-03 | End: 2019-11-04 | Stop reason: HOSPADM

## 2019-11-03 RX ADMIN — SODIUM CHLORIDE 500 ML: 9 INJECTION, SOLUTION INTRAVENOUS at 23:15

## 2019-11-03 RX ADMIN — IOPAMIDOL 100 ML: 755 INJECTION, SOLUTION INTRAVENOUS at 22:57

## 2019-11-04 ENCOUNTER — TELEPHONE (OUTPATIENT)
Dept: CARDIOLOGY | Facility: HOSPITAL | Age: 64
End: 2019-11-04

## 2019-11-04 NOTE — TELEPHONE ENCOUNTER
"Pt left message 11/3 stating she \"didn't feel well\"    Returned pts call this am.  She presented to ER yesterday with abdominal pain, LE edema and some swelling in groin.  Per pt workup, imaging and labs unremarkable.      She states he has not felt hungry, missed some doses of carafate and only had 1 BM.  Pt encouraged to take carafate as directed, use miralax per directions (has it on hand), fiber and fluids.  Will call with any changes and in the am with update.    Estrella Wiggins RN     "

## 2019-11-04 NOTE — ED PROVIDER NOTES
Subjective   64-year-old female presents for evaluation of postoperative complication.  She states that on October 31 she underwent a successful cardiac ablation by Dr. Rodriguez for atrial fibrillation.  She was discharged home on Friday in good condition.  However, she states that over the past day she has had pain and bruising to her right groin and pain in her lower abdomen as well.  She states that she feels as if her lower abdomen is distended and has gained approximately 7 pounds over the past 24 hours as a result.  Her pain is worse with palpation.  She endorses compliance with her anticoagulation.  No fevers or other systemic symptoms.        History provided by:  Patient  Abdominal Pain   Pain location: lower abdominal pain.  Pain severity:  Moderate  Duration:  1 day  Timing:  Constant  Progression:  Worsening  Chronicity:  New  Context: previous surgery (underwent cardiac ablation on 10/31)    Relieved by:  None tried  Worsened by:  Palpation  Ineffective treatments:  None tried  Associated symptoms comment:  Unexpected weight gain  Pain and swelling around surgical site      Review of Systems   Constitutional: Positive for unexpected weight change (7 lb gain).   Gastrointestinal: Positive for abdominal pain.   Skin:        Swelling in the right upper leg, around surgical site   All other systems reviewed and are negative.      Past Medical History:   Diagnosis Date   • Anesthesia     post anesthesia headahces    • Atrial fibrillation (CMS/HCC)    • Colon polyp    • DDD (degenerative disc disease), lumbar    • Frequent UTI     none recently    • Kidney infection    • Migraines        Allergies   Allergen Reactions   • Bactrim [Sulfamethoxazole-Trimethoprim] Hives   • Tramadol Shortness Of Breath, Nausea Only and Other (See Comments)     CHEST PAIN   • Flecainide Other (See Comments)     Headaches         Past Surgical History:   Procedure Laterality Date   • COLONOSCOPY     • HYSTERECTOMY  2006    bso    • SKIN BIOPSY     • TOTAL HIP ARTHROPLASTY Right 2015   • VEIN SURGERY  2005       Family History   Problem Relation Age of Onset   • Heart attack Mother    • Heart disease Mother         CABG   • Cancer Mother    • Hyperlipidemia Mother    • Stroke Mother    • Angina Father    • Heart attack Father    • Heart disease Father         CABG,VALVE REPLACEMENT   • Arrhythmia Father         pacemaker   • Mitral valve prolapse Father    • Cancer Father    • Hyperlipidemia Father    • Stroke Father    • Hypertension Brother    • Stroke Brother        Social History     Socioeconomic History   • Marital status:      Spouse name: Not on file   • Number of children: Not on file   • Years of education: Not on file   • Highest education level: Not on file   Tobacco Use   • Smoking status: Never Smoker   • Smokeless tobacco: Never Used   Substance and Sexual Activity   • Alcohol use: No   • Drug use: No   • Sexual activity: Defer         Objective   Physical Exam   Constitutional: She is oriented to person, place, and time. She appears well-developed and well-nourished. No distress.   Well-appearing female no acute distress   HENT:   Head: Normocephalic and atraumatic.   Mouth/Throat: Oropharynx is clear and moist.   Cardiovascular: Normal rate, regular rhythm, normal heart sounds and intact distal pulses. Exam reveals no gallop and no friction rub.   No murmur heard.  Pulmonary/Chest: Effort normal and breath sounds normal. No respiratory distress. She has no wheezes. She has no rales.   Abdominal: Soft. Bowel sounds are normal. She exhibits no distension and no mass. There is tenderness. There is no rebound and no guarding.   Mild tenderness noted to lower abdomen and right lower quadrant, left lower quadrant, and suprapubic regions with voluntary guarding present, no pain out of proportion to exam   Musculoskeletal: Normal range of motion. She exhibits no edema.   Neurological: She is alert and oriented to person,  place, and time.   Neurovascularly intact distally in bilateral lower extremities with bounding distal pulses normal sensation, 5 out of 5 strength in both lower extremities   Skin: She is not diaphoretic.   Bruising noted to proximal aspect of right medial groin, mild tenderness to palpation, no crepitus, no asymmetry or soft tissue swelling when compared to the left   Psychiatric: She has a normal mood and affect. Judgment and thought content normal.   Nursing note and vitals reviewed.      Procedures         ED Course  ED Course as of Nov 04 0320   Sun Nov 03, 2019   2305 64-year-old female presents for evaluation of postoperative complication.  She underwent a cardiac ablation on October 31 and was discharged home on Friday.  She presents today complaining of pain in her right groin, bruising in her right groin.  Lower abdominal pain, abdominal distention, and 7 pound unintentional weight gain.  On arrival to the ED, patient nontoxic-appearing.  Exam remarkable for mild focal lower abdominal tenderness as well as bruising to proximal right medial groin.  She is neurovascularly intact distally in bilateral lower extremities.  Labs unrevealing.  We will obtain imaging and will reassess following initial interventions.  [DD]   2333 CT angiogram of abdomen with runoff negative for leak or postoperative complication.  [DD]   2340 Upon reevaluation, patient is much improved.  Reassured and counseled regarding symptomatic management.  The patient has an appointment scheduled with her primary care physician within the next week and will follow-up as directed.  Doubt emergent process at this time.  Agreeable with plan and given appropriate strict return precautions.  [DD]      ED Course User Index  [DD] Russell Olmedo MD     Recent Results (from the past 24 hour(s))   Light Blue Top    Collection Time: 11/03/19 10:11 PM   Result Value Ref Range    Extra Tube hold for add-on    Green Top (Gel)    Collection Time:  11/03/19 10:11 PM   Result Value Ref Range    Extra Tube Hold for add-ons.    Lavender Top    Collection Time: 11/03/19 10:11 PM   Result Value Ref Range    Extra Tube hold for add-on    Gold Top - SST    Collection Time: 11/03/19 10:11 PM   Result Value Ref Range    Extra Tube Hold for add-ons.    Comprehensive Metabolic Panel    Collection Time: 11/03/19 10:11 PM   Result Value Ref Range    Glucose 99 65 - 99 mg/dL    BUN 10 8 - 23 mg/dL    Creatinine 0.74 0.57 - 1.00 mg/dL    Sodium 142 136 - 145 mmol/L    Potassium 3.8 3.5 - 5.2 mmol/L    Chloride 107 98 - 107 mmol/L    CO2 27.0 22.0 - 29.0 mmol/L    Calcium 9.0 8.6 - 10.5 mg/dL    Total Protein 6.8 6.0 - 8.5 g/dL    Albumin 3.90 3.50 - 5.20 g/dL    ALT (SGPT) 15 1 - 33 U/L    AST (SGOT) 29 1 - 32 U/L    Alkaline Phosphatase 63 39 - 117 U/L    Total Bilirubin 0.5 0.2 - 1.2 mg/dL    eGFR Non African Amer 79 >60 mL/min/1.73    Globulin 2.9 gm/dL    A/G Ratio 1.3 g/dL    BUN/Creatinine Ratio 13.5 7.0 - 25.0    Anion Gap 8.0 5.0 - 15.0 mmol/L   Protime-INR    Collection Time: 11/03/19 10:11 PM   Result Value Ref Range    Protime 14.6 (H) 11.2 - 14.3 Seconds    INR 1.19 (H) 0.85 - 1.16   Lipase    Collection Time: 11/03/19 10:11 PM   Result Value Ref Range    Lipase 34 13 - 60 U/L   CBC Auto Differential    Collection Time: 11/03/19 10:11 PM   Result Value Ref Range    WBC 4.65 3.40 - 10.80 10*3/mm3    RBC 3.95 3.77 - 5.28 10*6/mm3    Hemoglobin 12.1 12.0 - 15.9 g/dL    Hematocrit 38.5 34.0 - 46.6 %    MCV 97.5 (H) 79.0 - 97.0 fL    MCH 30.6 26.6 - 33.0 pg    MCHC 31.4 (L) 31.5 - 35.7 g/dL    RDW 11.8 (L) 12.3 - 15.4 %    RDW-SD 42.3 37.0 - 54.0 fl    MPV 9.9 6.0 - 12.0 fL    Platelets 146 140 - 450 10*3/mm3    Neutrophil % 54.8 42.7 - 76.0 %    Lymphocyte % 32.5 19.6 - 45.3 %    Monocyte % 9.7 5.0 - 12.0 %    Eosinophil % 2.4 0.3 - 6.2 %    Basophil % 0.4 0.0 - 1.5 %    Immature Grans % 0.2 0.0 - 0.5 %    Neutrophils, Absolute 2.55 1.70 - 7.00 10*3/mm3     "Lymphocytes, Absolute 1.51 0.70 - 3.10 10*3/mm3    Monocytes, Absolute 0.45 0.10 - 0.90 10*3/mm3    Eosinophils, Absolute 0.11 0.00 - 0.40 10*3/mm3    Basophils, Absolute 0.02 0.00 - 0.20 10*3/mm3    Immature Grans, Absolute 0.01 0.00 - 0.05 10*3/mm3    nRBC 0.0 0.0 - 0.2 /100 WBC   Urinalysis With Microscopic If Indicated (No Culture) - Urine, Clean Catch    Collection Time: 11/03/19 11:13 PM   Result Value Ref Range    Color, UA Yellow Yellow, Straw    Appearance, UA Clear Clear    pH, UA 7.5 5.0 - 8.0    Specific Gravity, UA 1.018 1.001 - 1.030    Glucose, UA Negative Negative    Ketones, UA Negative Negative    Bilirubin, UA Negative Negative    Blood, UA Negative Negative    Protein, UA Negative Negative    Leuk Esterase, UA Negative Negative    Nitrite, UA Negative Negative    Urobilinogen, UA 0.2 E.U./dL 0.2 - 1.0 E.U./dL     Note: In addition to lab results from this visit, the labs listed above may include labs taken at another facility or during a different encounter within the last 24 hours. Please correlate lab times with ED admission and discharge times for further clarification of the services performed during this visit.    CT Angio Abdominal Aorta Bilateral Iliofem Runoff   Final Result      1. Normal CT angiogram of the abdomen, pelvis, and both thighs.      Signer Name: Tam Anaya MD    Signed: 11/3/2019 11:20 PM    Work[facility]: [email or web address]     [facility]        Vitals:    11/03/19 2203   BP: 133/72   BP Location: Right arm   Patient Position: Sitting   Pulse: 70   Resp: 16   Temp: 98.3 °F (36.8 °C)   TempSrc: Oral   SpO2: 97%   Weight: 56.2 kg (124 lb)   Height: 162.6 cm (64\")     Medications   sodium chloride 0.9 % flush 10 mL (not administered)   sodium chloride 0.9 % flush 10 mL (not administered)   sodium chloride 0.9 % bolus 500 mL (500 mL Intravenous New Bag 11/3/19 4664)   HYDROmorphone (DILAUDID) injection 0.5 mg (not administered)   ondansetron " (ZOFRAN) injection 4 mg (not administered)   iopamidol (ISOVUE-370) 76 % injection 100 mL (100 mL Intravenous Given 11/3/19 5679)     ECG/EMG Results (last 24 hours)     ** No results found for the last 24 hours. **        ECG 12 Lead    (Results Pending)                 Recent Results (from the past 24 hour(s))   Light Blue Top    Collection Time: 11/03/19 10:11 PM   Result Value Ref Range    Extra Tube hold for add-on    Green Top (Gel)    Collection Time: 11/03/19 10:11 PM   Result Value Ref Range    Extra Tube Hold for add-ons.    Lavender Top    Collection Time: 11/03/19 10:11 PM   Result Value Ref Range    Extra Tube hold for add-on    Gold Top - SST    Collection Time: 11/03/19 10:11 PM   Result Value Ref Range    Extra Tube Hold for add-ons.    Comprehensive Metabolic Panel    Collection Time: 11/03/19 10:11 PM   Result Value Ref Range    Glucose 99 65 - 99 mg/dL    BUN 10 8 - 23 mg/dL    Creatinine 0.74 0.57 - 1.00 mg/dL    Sodium 142 136 - 145 mmol/L    Potassium 3.8 3.5 - 5.2 mmol/L    Chloride 107 98 - 107 mmol/L    CO2 27.0 22.0 - 29.0 mmol/L    Calcium 9.0 8.6 - 10.5 mg/dL    Total Protein 6.8 6.0 - 8.5 g/dL    Albumin 3.90 3.50 - 5.20 g/dL    ALT (SGPT) 15 1 - 33 U/L    AST (SGOT) 29 1 - 32 U/L    Alkaline Phosphatase 63 39 - 117 U/L    Total Bilirubin 0.5 0.2 - 1.2 mg/dL    eGFR Non African Amer 79 >60 mL/min/1.73    Globulin 2.9 gm/dL    A/G Ratio 1.3 g/dL    BUN/Creatinine Ratio 13.5 7.0 - 25.0    Anion Gap 8.0 5.0 - 15.0 mmol/L   Protime-INR    Collection Time: 11/03/19 10:11 PM   Result Value Ref Range    Protime 14.6 (H) 11.2 - 14.3 Seconds    INR 1.19 (H) 0.85 - 1.16   Lipase    Collection Time: 11/03/19 10:11 PM   Result Value Ref Range    Lipase 34 13 - 60 U/L   CBC Auto Differential    Collection Time: 11/03/19 10:11 PM   Result Value Ref Range    WBC 4.65 3.40 - 10.80 10*3/mm3    RBC 3.95 3.77 - 5.28 10*6/mm3    Hemoglobin 12.1 12.0 - 15.9 g/dL    Hematocrit 38.5 34.0 - 46.6 %    MCV 97.5  (H) 79.0 - 97.0 fL    MCH 30.6 26.6 - 33.0 pg    MCHC 31.4 (L) 31.5 - 35.7 g/dL    RDW 11.8 (L) 12.3 - 15.4 %    RDW-SD 42.3 37.0 - 54.0 fl    MPV 9.9 6.0 - 12.0 fL    Platelets 146 140 - 450 10*3/mm3    Neutrophil % 54.8 42.7 - 76.0 %    Lymphocyte % 32.5 19.6 - 45.3 %    Monocyte % 9.7 5.0 - 12.0 %    Eosinophil % 2.4 0.3 - 6.2 %    Basophil % 0.4 0.0 - 1.5 %    Immature Grans % 0.2 0.0 - 0.5 %    Neutrophils, Absolute 2.55 1.70 - 7.00 10*3/mm3    Lymphocytes, Absolute 1.51 0.70 - 3.10 10*3/mm3    Monocytes, Absolute 0.45 0.10 - 0.90 10*3/mm3    Eosinophils, Absolute 0.11 0.00 - 0.40 10*3/mm3    Basophils, Absolute 0.02 0.00 - 0.20 10*3/mm3    Immature Grans, Absolute 0.01 0.00 - 0.05 10*3/mm3    nRBC 0.0 0.0 - 0.2 /100 WBC   Urinalysis With Microscopic If Indicated (No Culture) - Urine, Clean Catch    Collection Time: 11/03/19 11:13 PM   Result Value Ref Range    Color, UA Yellow Yellow, Straw    Appearance, UA Clear Clear    pH, UA 7.5 5.0 - 8.0    Specific Gravity, UA 1.018 1.001 - 1.030    Glucose, UA Negative Negative    Ketones, UA Negative Negative    Bilirubin, UA Negative Negative    Blood, UA Negative Negative    Protein, UA Negative Negative    Leuk Esterase, UA Negative Negative    Nitrite, UA Negative Negative    Urobilinogen, UA 0.2 E.U./dL 0.2 - 1.0 E.U./dL     Note: In addition to lab results from this visit, the labs listed above may include labs taken at another facility or during a different encounter within the last 24 hours. Please correlate lab times with ED admission and discharge times for further clarification of the services performed during this visit.    CT Angio Abdominal Aorta Bilateral Iliofem Runoff   Final Result      1. Normal CT angiogram of the abdomen, pelvis, and both thighs.      Signer Name: Tam Anaya MD    Signed: 11/3/2019 11:20 PM    Workstation Name: [email or web address]     [facility]        Vitals:    11/03/19 2203   BP: 133/72   BP Location:  "Right arm   Patient Position: Sitting   Pulse: 70   Resp: 16   Temp: 98.3 °F (36.8 °C)   TempSrc: Oral   SpO2: 97%   Weight: 56.2 kg (124 lb)   Height: 162.6 cm (64\")     Medications   sodium chloride 0.9 % bolus 500 mL (0 mL Intravenous Stopped 11/3/19 9520)   iopamidol (ISOVUE-370) 76 % injection 100 mL (100 mL Intravenous Given 11/3/19 8847)     ECG/EMG Results (last 24 hours)     ** No results found for the last 24 hours. **        No orders to display             MDM    Final diagnoses:   Lower abdominal pain   Post-operative pain   Contusion of right groin, initial encounter       Documentation assistance provided by nino Alvarez.  Information recorded by the scribe was done at my direction and has been verified and validated by me.     Inna Alvarez  11/03/19 1663       Inna Alvarez  11/03/19 0272       Russell Olmedo MD  11/04/19 0320    "

## 2019-11-06 ENCOUNTER — TELEPHONE (OUTPATIENT)
Dept: CARDIOLOGY | Facility: HOSPITAL | Age: 64
End: 2019-11-06

## 2019-11-06 NOTE — TELEPHONE ENCOUNTER
Pt phoned reporting extreme HA with pain causing nausea.  Had an appt with PCP today.  /100.  They advised her HA seems related to blood pressure.  Order given to restart coreg 3.125 mg daily with bp checks daily and 1 week follow up.  GFT advised of update and agrees with plan.      Estrella Wiggins RN

## 2019-11-11 ENCOUNTER — COMMUNICATION - HEALTHEAST (OUTPATIENT)
Dept: INTERNAL MEDICINE | Facility: CLINIC | Age: 64
End: 2019-11-11

## 2019-11-11 DIAGNOSIS — B36.9 FUNGAL DERMATITIS: ICD-10-CM

## 2019-11-14 ENCOUNTER — RECORDS - HEALTHEAST (OUTPATIENT)
Dept: ADMINISTRATIVE | Facility: OTHER | Age: 64
End: 2019-11-14

## 2019-12-02 ENCOUNTER — OFFICE VISIT (OUTPATIENT)
Dept: CARDIOLOGY | Facility: HOSPITAL | Age: 64
End: 2019-12-02

## 2019-12-02 ENCOUNTER — HOSPITAL ENCOUNTER (OUTPATIENT)
Dept: CARDIOLOGY | Facility: HOSPITAL | Age: 64
Discharge: HOME | End: 2019-12-02
Admitting: NURSE PRACTITIONER

## 2019-12-02 VITALS
BODY MASS INDEX: 20.34 KG/M2 | HEIGHT: 64 IN | SYSTOLIC BLOOD PRESSURE: 103 MMHG | HEART RATE: 69 BPM | RESPIRATION RATE: 18 BRPM | OXYGEN SATURATION: 99 % | DIASTOLIC BLOOD PRESSURE: 65 MMHG | TEMPERATURE: 97.9 F | WEIGHT: 119.13 LBS

## 2019-12-02 DIAGNOSIS — I48.0 PAROXYSMAL ATRIAL FIBRILLATION (HCC): ICD-10-CM

## 2019-12-02 DIAGNOSIS — R10.13 EPIGASTRIC PAIN: ICD-10-CM

## 2019-12-02 DIAGNOSIS — I48.0 PAROXYSMAL ATRIAL FIBRILLATION (HCC): Primary | ICD-10-CM

## 2019-12-02 PROCEDURE — 93010 ELECTROCARDIOGRAM REPORT: CPT | Performed by: INTERNAL MEDICINE

## 2019-12-02 PROCEDURE — 99214 OFFICE O/P EST MOD 30 MIN: CPT | Performed by: NURSE PRACTITIONER

## 2019-12-02 PROCEDURE — 93005 ELECTROCARDIOGRAM TRACING: CPT | Performed by: NURSE PRACTITIONER

## 2019-12-02 RX ORDER — CARVEDILOL 3.12 MG/1
3.12 TABLET ORAL
COMMUNITY
End: 2020-02-12

## 2019-12-02 NOTE — PROGRESS NOTES
[facility]  [facility]      Encounter Date:12/02/2019     Kiesha QUACH [id number]  [unfilled]    1955    Tyler Rucker MD    Kiesha Dempsey is a 64 y.o. female.      Subjective:     Chief Complaint:  Atrial Fibrillation and Establish Care       HPI 64-year-old female presents the office today for ongoing evaluation of her paroxysmal atrial fibrillation.  She underwent a PVA per Dr. Rodriguez on 10/31/2019.After PVA, patient presented [facility] ED on 11/3/2019 with abdominal pain and lower extremity edema and swelling in the groin.  Work-up, imaging and labs were unremarkable.  On 11/6/2019 patient reported extreme headache with pain causing nausea.  She was evaluated by her primary care and blood pressure was elevated at 133/100.  Patient restarted carvedilol at that time and was to monitor blood pressure closely.  She reports blood pressures are well controlled now.  She is taking carvedilol 3.125 mg nightly.  She reports some brief irregular heartbeats and palpitations but denies any A. fib episodes since ablation.  Reports ongoing abdominal pain that worsens with eating.  She reports she stopped her Carafate prior to the week post ablation.  She reports she took her last dose of pantoprazole yesterday.  She is anticoagulated with Eliquis and denies any signs and symptoms of bleeding.  Notes ecchymosis in groin has resolved.  Denies any tenderness in groin.    Patient Active Problem List   Diagnosis   • Precordial pain   • SOB (shortness of breath)   • Palpitations   • Syncope   • Abnormal ECG   • Murmur, cardiac   • Pulmonary hypertension (CMS/HCC)   • Paroxysmal atrial fibrillation (CMS/HCC)   • Tachy-clementina syndrome (CMS/HCC)       Past Medical History:   Diagnosis Date   • Anesthesia     post anesthesia headahces    • Atrial fibrillation (CMS/HCC)    • Colon polyp    • DDD (degenerative disc disease), lumbar    • Frequent UTI      none recently    • Kidney infection    • Migraines        Past Surgical History:   Procedure Laterality Date   • CARDIAC ELECTROPHYSIOLOGY PROCEDURE N/A 10/31/2019    Procedure: Ablation atrial fibrillation, hold metoprolol 3-5 days prior;  Surgeon: Bruce Rodriguez MD;  Location: [facility] INVASIVE LOCATION;  Service: Cardiovascular   • COLONOSCOPY     • HYSTERECTOMY  2006    bso   • SKIN BIOPSY     • TOTAL HIP ARTHROPLASTY Right 2015   • VEIN SURGERY  2005       Family History   Problem Relation Age of Onset   • Heart attack Mother    • Heart disease Mother         CABG   • Cancer Mother    • Hyperlipidemia Mother    • Stroke Mother    • Angina Father    • Heart attack Father    • Heart disease Father         CABG,VALVE REPLACEMENT   • Arrhythmia Father         pacemaker   • Mitral valve prolapse Father    • Cancer Father    • Hyperlipidemia Father    • Stroke Father    • Hypertension Brother    • Stroke Brother        Social History     Socioeconomic History   • Marital status:      Spouse name: Not on file   • Number of children: Not on file   • Years of education: Not on file   • Highest education level: Not on file   Tobacco Use   • Smoking status: Never Smoker   • Smokeless tobacco: Never Used   Substance and Sexual Activity   • Alcohol use: No   • Drug use: No   • Sexual activity: Defer   Social History Narrative    Caffeine use: drinks herbal tea occassionally caffinated tea       Allergies   Allergen Reactions   • Bactrim [Sulfamethoxazole-Trimethoprim] Hives   • Tramadol Shortness Of Breath, Nausea Only and Other (See Comments)     CHEST PAIN   • Flecainide Other (See Comments)     Headaches           Current Outpatient Medications:   •  apixaban (ELIQUIS) 5 MG tablet tablet, Take 1 tablet by mouth Every 12 (Twelve) Hours., Disp: 60 tablet, Rfl: 11  •  carvedilol (COREG) 3.125 MG tablet, Take 3.125 mg by mouth every night at bedtime., Disp: , Rfl:   •  coenzyme Q10 100 MG capsule, Take 100 mg by  mouth Daily., Disp: , Rfl:   •  Estriol 10 % cream, Insert 1 mL into the vagina Daily As Needed., Disp: , Rfl: 5  •  topiramate (TOPAMAX) 25 MG capsule, Take 25 mg by mouth 2 (Two) Times a Day., Disp: , Rfl:   •  ZOLMitriptan (ZOMIG) 5 MG tablet, Take 5 mg by mouth 1 (One) Time As Needed for Migraine., Disp: , Rfl:     The following portions of the patient's history were reviewed today and updated as appropriate: allergies, current medications, past family history, past medical history, past social history, past surgical history and problem list     Review of Systems   Constitution: Negative for chills, decreased appetite, diaphoresis, fever, weakness, malaise/fatigue, night sweats, weight gain and weight loss.   HENT: Negative for congestion, hearing loss, hoarse voice and nosebleeds.    Eyes: Negative for blurred vision, visual disturbance and visual halos.   Cardiovascular: Negative for chest pain, claudication, cyanosis, dyspnea on exertion, irregular heartbeat, leg swelling, near-syncope, orthopnea, palpitations, paroxysmal nocturnal dyspnea and syncope.   Respiratory: Negative for cough, hemoptysis, shortness of breath, sleep disturbances due to breathing, snoring, sputum production and wheezing.    Hematologic/Lymphatic: Negative for bleeding problem. Does not bruise/bleed easily.   Skin: Negative for dry skin, itching and rash.   Musculoskeletal: Negative for arthritis, falls, joint pain, joint swelling and myalgias.   Gastrointestinal: Positive for abdominal pain and nausea. Negative for bloating, constipation, diarrhea, flatus, heartburn, hematemesis, hematochezia, melena and vomiting.   Genitourinary: Negative for dysuria, frequency, hematuria, nocturia and urgency.   Neurological: Negative for excessive daytime sleepiness, dizziness, headaches, light-headedness and loss of balance.   Psychiatric/Behavioral: Negative for depression. The patient does not have insomnia and is not nervous/anxious.   "      Objective:     Vitals:    12/02/19 1129 12/02/19 1131 12/02/19 1132   BP: 108/68 110/71 103/65   BP Location: Right arm Left arm Left arm   Patient Position: Sitting Sitting Standing   Cuff Size: Adult Adult Adult   Pulse: 67  69   Resp: 18     Temp: 97.9 °F (36.6 °C)     TempSrc: Temporal     SpO2: 100%  99%   Weight: 54 kg (119 lb 2 oz)     Height: 162.6 cm (64\")       Body mass index is 20.45 kg/m².  Physical Exam   Constitutional: She is oriented to person, place, and time. She appears well-developed and well-nourished. She is active and cooperative. No distress.   HENT:   Head: Normocephalic and atraumatic.   Mouth/Throat: Oropharynx is clear and moist.   Eyes: Conjunctivae and EOM are normal. Pupils are equal, round, and reactive to light.   Neck: Normal range of motion. Neck supple. No JVD present. No tracheal deviation present. No thyromegaly present.   Cardiovascular: Normal rate, regular rhythm, normal heart sounds and intact distal pulses.   Pulmonary/Chest: Effort normal and breath sounds normal.   Abdominal: Soft. Bowel sounds are normal. She exhibits no distension. There is no tenderness.   Musculoskeletal: Normal range of motion.   Neurological: She is alert and oriented to person, place, and time.   Skin: Skin is warm, dry and intact.   Psychiatric: She has a normal mood and affect. Her behavior is normal.   Nursing note and vitals reviewed.      Lab and Diagnostic Review:  Results for orders placed or performed during the hospital encounter of 11/03/19   Comprehensive Metabolic Panel   Result Value Ref Range    Glucose 99 65 - 99 mg/dL    BUN 10 8 - 23 mg/dL    Creatinine 0.74 0.57 - 1.00 mg/dL    Sodium 142 136 - 145 mmol/L    Potassium 3.8 3.5 - 5.2 mmol/L    Chloride 107 98 - 107 mmol/L    CO2 27.0 22.0 - 29.0 mmol/L    Calcium 9.0 8.6 - 10.5 mg/dL    Total Protein 6.8 6.0 - 8.5 g/dL    Albumin 3.90 3.50 - 5.20 g/dL    ALT (SGPT) 15 1 - 33 U/L    AST (SGOT) 29 1 - 32 U/L    Alkaline " Phosphatase 63 39 - 117 U/L    Total Bilirubin 0.5 0.2 - 1.2 mg/dL    eGFR Non African Amer 79 >60 mL/min/1.73    Globulin 2.9 gm/dL    A/G Ratio 1.3 g/dL    BUN/Creatinine Ratio 13.5 7.0 - 25.0    Anion Gap 8.0 5.0 - 15.0 mmol/L   Protime-INR   Result Value Ref Range    Protime 14.6 (H) 11.2 - 14.3 Seconds    INR 1.19 (H) 0.85 - 1.16   Urinalysis With Microscopic If Indicated (No Culture) - Urine, Clean Catch   Result Value Ref Range    Color, UA Yellow Yellow, Straw    Appearance, UA Clear Clear    pH, UA 7.5 5.0 - 8.0    Specific Gravity, UA 1.018 1.001 - 1.030    Glucose, UA Negative Negative    Ketones, UA Negative Negative    Bilirubin, UA Negative Negative    Blood, UA Negative Negative    Protein, UA Negative Negative    Leuk Esterase, UA Negative Negative    Nitrite, UA Negative Negative    Urobilinogen, UA 0.2 E.U./dL 0.2 - 1.0 E.U./dL   Lipase   Result Value Ref Range    Lipase 34 13 - 60 U/L   CBC Auto Differential   Result Value Ref Range    WBC 4.65 3.40 - 10.80 10*3/mm3    RBC 3.95 3.77 - 5.28 10*6/mm3    Hemoglobin 12.1 12.0 - 15.9 g/dL    Hematocrit 38.5 34.0 - 46.6 %    MCV 97.5 (H) 79.0 - 97.0 fL    MCH 30.6 26.6 - 33.0 pg    MCHC 31.4 (L) 31.5 - 35.7 g/dL    RDW 11.8 (L) 12.3 - 15.4 %    RDW-SD 42.3 37.0 - 54.0 fl    MPV 9.9 6.0 - 12.0 fL    Platelets 146 140 - 450 10*3/mm3    Neutrophil % 54.8 42.7 - 76.0 %    Lymphocyte % 32.5 19.6 - 45.3 %    Monocyte % 9.7 5.0 - 12.0 %    Eosinophil % 2.4 0.3 - 6.2 %    Basophil % 0.4 0.0 - 1.5 %    Immature Grans % 0.2 0.0 - 0.5 %    Neutrophils, Absolute 2.55 1.70 - 7.00 10*3/mm3    Lymphocytes, Absolute 1.51 0.70 - 3.10 10*3/mm3    Monocytes, Absolute 0.45 0.10 - 0.90 10*3/mm3    Eosinophils, Absolute 0.11 0.00 - 0.40 10*3/mm3    Basophils, Absolute 0.02 0.00 - 0.20 10*3/mm3    Immature Grans, Absolute 0.01 0.00 - 0.05 10*3/mm3    nRBC 0.0 0.0 - 0.2 /100 WBC   Light Blue Top   Result Value Ref Range    Extra Tube hold for add-on    Green Top (Gel)   Result  Value Ref Range    Extra Tube Hold for add-ons.    Lavender Top   Result Value Ref Range    Extra Tube hold for add-on    Gold Top - SST   Result Value Ref Range    Extra Tube Hold for add-ons.      EKG: Normal sinus rhythm at 61 bpm    Assessment and Plan:   1. Paroxysmal atrial fibrillation (CMS/HCC)  Status post PVA on 10/31/2019; maintaining normal sinus rhythm  - ECG 12 Lead; Future  CHADS-VASc Risk Assessment            2       Total Score        1 Age 65-74    1 Sex: Female        Criteria that do not apply:    CHF    Hypertension    Age >/= 75    DM    PRIOR STROKE/TIA/THROMBO    Vascular Disease        Anticoagulated with eliquis and denies any s/s of bleeding   2. Epigastric pain  Restart carafate 4 times a day for one week  Avoid carbonated beverages    Begin miralax for constipation          It has been a pleasure to participate in the care of this patient.  Patient was instructed to call the [facility] Center with any questions, concerns, or worsening symptoms.    *Please note that portions of this note were completed with a voice recognition program. Efforts were made to edit the dictations, but occasionally words are mistranscribed.

## 2019-12-05 ENCOUNTER — TELEPHONE (OUTPATIENT)
Dept: CARDIOLOGY | Facility: HOSPITAL | Age: 64
End: 2019-12-05

## 2019-12-05 NOTE — TELEPHONE ENCOUNTER
----- Message from Estrella Wiggins RN sent at 12/4/2019  2:48 PM EST -----  Pt phoned today @ 1245 stating she has had 2 dizzy episodes today accompanied by diaphoresis.  She denied pain/any other symptons.  When nurse writer phoned pt back she stated she felt so poorly it was hard to talk.  /72, P 68.  Offered appt for evaluation today she stated she is 2 hours away and unable to come in today.  Nurse writer rec'd she be evaluated.  Pt stated she would call PCP for an appt this afternoon.

## 2019-12-10 ENCOUNTER — CONSULT (OUTPATIENT)
Dept: SLEEP MEDICINE | Facility: HOSPITAL | Age: 64
End: 2019-12-10

## 2019-12-10 VITALS
OXYGEN SATURATION: 97 % | HEART RATE: 67 BPM | HEIGHT: 64 IN | BODY MASS INDEX: 20.49 KG/M2 | SYSTOLIC BLOOD PRESSURE: 91 MMHG | DIASTOLIC BLOOD PRESSURE: 67 MMHG | WEIGHT: 120 LBS

## 2019-12-10 DIAGNOSIS — G47.33 OBSTRUCTIVE SLEEP APNEA (ADULT) (PEDIATRIC): ICD-10-CM

## 2019-12-10 DIAGNOSIS — R06.83 SNORING: Primary | ICD-10-CM

## 2019-12-10 DIAGNOSIS — I48.0 PAROXYSMAL ATRIAL FIBRILLATION (HCC): ICD-10-CM

## 2019-12-10 PROCEDURE — 99204 OFFICE O/P NEW MOD 45 MIN: CPT | Performed by: INTERNAL MEDICINE

## 2019-12-10 RX ORDER — SUCRALFATE 1 G/1
1 TABLET ORAL 4 TIMES DAILY
COMMUNITY
End: 2020-02-12

## 2019-12-10 NOTE — PROGRESS NOTES
New Sleep Patient Office Visit      Patient Name: Kiesha Dempsey    Referring Physician: Bruce Rodriguez MD    Chief Complaint:    Chief Complaint   Patient presents with   • Sleeping Problem       History of Present Illness: Kiesha Dempsey is a 64 y.o. female who is here today to establish care with Sleep Medicine.     64-year-old female coming here for initial evaluation.  Patient states that she was recently diagnosed with atrial fibrillation about 2 years ago.  She has been on anticoagulation ever since and recently underwent pulmonary vein ablation procedure by electrophysiology and she was advised to get evaluated for sleep apnea.  Patient states that her  does complain about her snoring and pauses in her breathing at nighttime.  She states that she generally goes to sleep easily but if anything wakes her up she has tough time going back to sleep.  She however wakes up between 7 to 7:30 AM in the morning regardless.  She does feel somewhat fatigued during the daytime.  Can go to sleep easily.  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  She does not take any naps during the daytime.  Does not drink much caffeine.  No smoking or alcohol use or illicit drug use.  Her blood pressure usually used to run low but after ablation it has been running high and she has been on Coreg which is controlling her blood pressure better.  She denies any restless leg symptoms.  Denies any other parasomnias.  She does have history of migraines but also get morning headaches intermittently as well.  Does wake up with dry mouth occasionally.  States that she has been losing weight consistently because she changed her diet to go dairy free and sugar-free because she was having a lot of abdominal symptoms.  Her symptoms have resolved but she continues to lose weight slowly.  Denies any driving  accidents or near miss accidents due to sleep at this point.  Denies any heartburn symptoms at night.  No orthopnea or PND symptoms at night either.  Denies any leg edema.    Patient's father had sleep apnea.    Further sleep history is as below.      [place] Scale: 11/24    Estimated average amount of sleep per night: 6-7 hrs  Number of times  she wakes up at night: 0  Difficulty falling back asleep: yes  It usually takes 15-30 minutes to go to sleep.  She feels sleepy upon waking up: no  Rotating or night shift work: no    Drowsiness/Sleepiness:  She exhibits the following:  falls asleep watching TV  difficulty driving due to sleepiness    Snoring/Breathing:  She exhibits the following:  loud snoring  awoken with dry mouth  quits breathing at night  awakens gasping for breath  morning headaches    Reflux:  She describes the following:  exercises daily    Narcolepsy:  She exhibits the following:  none    RLS/PLMs:  She describes the following:  none    Insomnia:  She describes the following:  problems initiating sleep at night    Parasomnia:  She exhibits the following:  None    Weight:  Weight change in the last year:  loss: 3 lbs    Subjective      Review of Systems:   Review of Systems   Constitutional: Positive for fatigue.   HENT: Positive for tinnitus.    Respiratory: Positive for cough and shortness of breath.    Cardiovascular: Positive for palpitations.   Gastrointestinal: Positive for abdominal pain and constipation.   Endocrine: Negative.    Musculoskeletal: Positive for back pain.   Skin: Negative.    Neurological: Positive for light-headedness and headache.   Hematological: Negative.    Psychiatric/Behavioral: Negative.    All other systems reviewed and are negative.      Past Medical History:   Past Medical History:   Diagnosis Date   • Anesthesia     post anesthesia headahces    • Atrial fibrillation (CMS/HCC)    • Colon polyp    • DDD (degenerative disc disease), lumbar    • Frequent UTI     none  recently    • Kidney infection    • Migraines        Past Surgical History:   Past Surgical History:   Procedure Laterality Date   • CARDIAC ABLATION  10/31/2019   • CARDIAC ELECTROPHYSIOLOGY PROCEDURE N/A 10/31/2019    Procedure: Ablation atrial fibrillation, hold metoprolol 3-5 days prior;  Surgeon: Bruce Rodriguez MD;  Location: [facility] INVASIVE LOCATION;  Service: Cardiovascular   • COLONOSCOPY     • HYSTERECTOMY  2006    bso   • SKIN BIOPSY     • TOTAL HIP ARTHROPLASTY Right 2015   • VEIN SURGERY  2005       Family History:   Family History   Problem Relation Age of Onset   • Heart attack Mother    • Heart disease Mother         CABG   • Hyperlipidemia Mother    • Stroke Mother    • Hypertension Mother    • COPD Mother    • Emphysema Mother    • Asthma Mother    • Cancer Mother         lung   • Angina Father    • Heart attack Father    • Heart disease Father         CABG,VALVE REPLACEMENT   • Arrhythmia Father         pacemaker   • Mitral valve prolapse Father    • Cancer Father         lung   • Hyperlipidemia Father    • Stroke Father    • Hypertension Father    • Sleep apnea Father    • Narcolepsy Father    • Hypersomnolence Father    • Stroke Brother        Social History:   Social History     Socioeconomic History   • Marital status:      Spouse name: Not on file   • Number of children: Not on file   • Years of education: Not on file   • Highest education level: Not on file   Tobacco Use   • Smoking status: Never Smoker   • Smokeless tobacco: Never Used   Substance and Sexual Activity   • Alcohol use: No   • Drug use: No   • Sexual activity: Defer   Social History Narrative    Caffeine use: drinks herbal tea occassionally caffinated tea       Medications:     Current Outpatient Medications:   •  apixaban (ELIQUIS) 5 MG tablet tablet, Take 1 tablet by mouth Every 12 (Twelve) Hours., Disp: 60 tablet, Rfl: 11  •  carvedilol (COREG) 3.125 MG tablet, Take 3.125 mg by mouth every night at bedtime.,  "Disp: , Rfl:   •  coenzyme Q10 100 MG capsule, Take 100 mg by mouth Daily., Disp: , Rfl:   •  Estriol 10 % cream, Insert 1 mL into the vagina Daily As Needed., Disp: , Rfl: 5  •  sucralfate (CARAFATE) 1 g tablet, Take 1 g by mouth 4 (Four) Times a Day., Disp: , Rfl:   •  topiramate (TOPAMAX) 25 MG capsule, Take 25 mg by mouth 2 (Two) Times a Day., Disp: , Rfl:   •  ZOLMitriptan (ZOMIG) 5 MG tablet, Take 5 mg by mouth 1 (One) Time As Needed for Migraine., Disp: , Rfl:     Allergies:   Allergies   Allergen Reactions   • Bactrim [Sulfamethoxazole-Trimethoprim] Hives   • Tramadol Shortness Of Breath, Nausea Only and Other (See Comments)     CHEST PAIN   • Flecainide Other (See Comments)     Headaches         Objective     Physical Exam:  Vital Signs:   Vitals:    12/10/19 1344   BP: 91/67   Pulse: 67   SpO2: 97%   Weight: 54.4 kg (120 lb)   Height: 162.6 cm (64\")       Physical Exam   Constitutional: She is oriented to person, place, and time. She appears well-developed and well-nourished. No distress.   HENT:   Head: Normocephalic and atraumatic.   Nose: Nose normal.   Mouth/Throat: Oropharynx is clear and moist. No oropharyngeal exudate.   Thrush: None  Mallampati Score: 1 with floppy soft palate    Eyes: Pupils are equal, round, and reactive to light. EOM are normal. Right eye exhibits no discharge. Left eye exhibits no discharge. No scleral icterus.   Neck: Neck supple. No tracheal deviation present. No thyromegaly present.   Cardiovascular: Normal rate, regular rhythm and normal heart sounds. Exam reveals no gallop and no friction rub.   No murmur heard.  Pulmonary/Chest: Effort normal and breath sounds normal. No stridor. No respiratory distress. She has no wheezes. She has no rales. She exhibits no tenderness.   Abdominal: Soft. She exhibits no distension and no mass. There is no tenderness. There is no guarding.   Musculoskeletal: She exhibits no edema or tenderness.   Clubbing: None   Lymphadenopathy:     She " has no cervical adenopathy.   Neurological: She is alert and oriented to person, place, and time. No cranial nerve deficit. Coordination normal.   Skin: She is not diaphoretic.   Psychiatric: She has a normal mood and affect. Her behavior is normal. Judgment and thought content normal.   Nursing note and vitals reviewed.      Results Review:   I reviewed the patient's new clinical results.    TSH 4.050    CBC normal.    Recent Echo showed normal EF.    Assessment / Plan      Assessment:   Problem List Items Addressed This Visit        Cardiovascular and Mediastinum    Paroxysmal atrial fibrillation (CMS/HCC)    Relevant Medications    carvedilol (COREG) 3.125 MG tablet    Other Relevant Orders    Home Sleep Study      Other Visit Diagnoses     Snoring    -  Primary    Relevant Orders    Home Sleep Study    Obstructive sleep apnea (adult) (pediatric)         Relevant Orders    Home Sleep Study            Plan:    1.  Patient with paroxysmal atrial fibrillation status post ablation, currently in sinus rhythm.  She does have risk factor for sleep apnea including snoring, witnessed apnea, postmenopausal state and upper airway abnormalities.  Discussed the risk of recurrent A. fib with untreated sleep apnea.  We talked about pathophysiology of sleep apnea.  Side effects of untreated sleep apnea discussed as well.  Available treatment options reviewed.  Will proceed with home sleep apnea testing.  If home sleep testing is normal then we will not pursue any further work-up.  She is comfortable with this plan.    2.   Continue follow-up with electrophysiology regarding atrial fibrillation management.  On anticoagulation as well and tolerating well.    We will follow her after sleep study to discuss further management options.  Patient had no further questions at this point.      Follow Up:   Schedule sleep study and follow up.    Discussed plan of care in detail with patient today. Patient verbally understands and agrees.      Trav Wallace MD  Pulmonary Critical Care and Sleep Medicine      Please note that portions of this note may have been completed with a voice recognition program. Efforts were made to edit the dictations, but occasionally words are mistranscribed.

## 2019-12-26 ENCOUNTER — HOSPITAL ENCOUNTER (OUTPATIENT)
Dept: SLEEP MEDICINE | Facility: HOSPITAL | Age: 64
Discharge: HOME | End: 2019-12-26
Admitting: INTERNAL MEDICINE

## 2019-12-26 VITALS
WEIGHT: 120.8 LBS | BODY MASS INDEX: 20.62 KG/M2 | HEART RATE: 64 BPM | HEIGHT: 64 IN | DIASTOLIC BLOOD PRESSURE: 60 MMHG | OXYGEN SATURATION: 99 % | SYSTOLIC BLOOD PRESSURE: 112 MMHG

## 2019-12-26 DIAGNOSIS — G47.33 OBSTRUCTIVE SLEEP APNEA (ADULT) (PEDIATRIC): ICD-10-CM

## 2019-12-26 DIAGNOSIS — I48.0 PAROXYSMAL ATRIAL FIBRILLATION (HCC): ICD-10-CM

## 2019-12-26 DIAGNOSIS — R06.83 SNORING: ICD-10-CM

## 2019-12-26 PROCEDURE — 95806 SLEEP STUDY UNATT&RESP EFFT: CPT | Performed by: INTERNAL MEDICINE

## 2019-12-26 PROCEDURE — 95806 SLEEP STUDY UNATT&RESP EFFT: CPT

## 2019-12-27 DIAGNOSIS — G47.33 OSA (OBSTRUCTIVE SLEEP APNEA): Primary | ICD-10-CM

## 2020-01-14 ENCOUNTER — RECORDS - HEALTHEAST (OUTPATIENT)
Dept: ADMINISTRATIVE | Facility: OTHER | Age: 65
End: 2020-01-14

## 2020-01-17 ENCOUNTER — RECORDS - HEALTHEAST (OUTPATIENT)
Dept: ADMINISTRATIVE | Facility: OTHER | Age: 65
End: 2020-01-17

## 2020-01-23 ENCOUNTER — TELEPHONE (OUTPATIENT)
Dept: CARDIOLOGY | Facility: HOSPITAL | Age: 65
End: 2020-01-23

## 2020-01-23 NOTE — TELEPHONE ENCOUNTER
Patient called and stated she is having low blood pressure again after being on the beta blocker. She she stated she had an  ablation and was dealing with high blood pressure and put on the beta blocker . She said that she is now have low blood pressure again. Stated that she has experienced some light headed  and racing heart beat. She didn't take her beta blocker last night and said that her blood pressure was 93/76 this morning. She wants to know if she can be titrated off of the beta blocker.  She has an appointment with Dr. Rodriguez in February but wanted to know if there is anything she can do now.

## 2020-01-23 NOTE — TELEPHONE ENCOUNTER
Spoke with patient who notes that she had cut her coreg down to 3.125 mg nightly (once a day dosing) every other day for the past few days. Patient notes dizziness with lower bps of 90s/40s. Patient to continue once a day dosing of coreg 3.125 mg daily for three days, then may stop.  Patient to watch bp closely. Patient verbalized understanding.

## 2020-02-12 ENCOUNTER — OFFICE VISIT (OUTPATIENT)
Dept: CARDIOLOGY | Facility: CLINIC | Age: 65
End: 2020-02-12

## 2020-02-12 ENCOUNTER — HOSPITAL ENCOUNTER (OUTPATIENT)
Dept: CARDIOLOGY | Facility: HOSPITAL | Age: 65
Discharge: HOME | End: 2020-02-12
Admitting: INTERNAL MEDICINE

## 2020-02-12 ENCOUNTER — LAB (OUTPATIENT)
Dept: LAB | Facility: HOSPITAL | Age: 65
End: 2020-02-12

## 2020-02-12 VITALS
HEIGHT: 64 IN | OXYGEN SATURATION: 95 % | HEART RATE: 73 BPM | WEIGHT: 119 LBS | BODY MASS INDEX: 20.32 KG/M2 | DIASTOLIC BLOOD PRESSURE: 66 MMHG | SYSTOLIC BLOOD PRESSURE: 100 MMHG

## 2020-02-12 VITALS — BODY MASS INDEX: 20.32 KG/M2 | HEIGHT: 64 IN | WEIGHT: 119 LBS

## 2020-02-12 DIAGNOSIS — R00.2 PALPITATIONS: ICD-10-CM

## 2020-02-12 DIAGNOSIS — I48.0 PAROXYSMAL ATRIAL FIBRILLATION (HCC): ICD-10-CM

## 2020-02-12 DIAGNOSIS — I48.0 PAROXYSMAL ATRIAL FIBRILLATION (HCC): Primary | ICD-10-CM

## 2020-02-12 LAB
BASOPHILS # BLD AUTO: 0.02 10*3/MM3 (ref 0–0.2)
BASOPHILS NFR BLD AUTO: 0.5 % (ref 0–1.5)
BH CV ECHO MEAS - AO MAX PG (FULL): 1.8 MMHG
BH CV ECHO MEAS - AO MAX PG: 3.7 MMHG
BH CV ECHO MEAS - AO ROOT AREA (BSA CORRECTED): 1.8
BH CV ECHO MEAS - AO ROOT AREA: 6.6 CM^2
BH CV ECHO MEAS - AO ROOT DIAM: 2.9 CM
BH CV ECHO MEAS - AO V2 MAX: 96.5 CM/SEC
BH CV ECHO MEAS - AVA(V,A): 2.2 CM^2
BH CV ECHO MEAS - AVA(V,D): 2.2 CM^2
BH CV ECHO MEAS - BSA(HAYCOCK): 1.6 M^2
BH CV ECHO MEAS - BSA: 1.6 M^2
BH CV ECHO MEAS - BZI_BMI: 20.4 KILOGRAMS/M^2
BH CV ECHO MEAS - BZI_METRIC_HEIGHT: 162.6 CM
BH CV ECHO MEAS - BZI_METRIC_WEIGHT: 54 KG
BH CV ECHO MEAS - EDV(CUBED): 79 ML
BH CV ECHO MEAS - EDV(MOD-SP2): 45 ML
BH CV ECHO MEAS - EDV(MOD-SP4): 55 ML
BH CV ECHO MEAS - EDV(TEICH): 82.6 ML
BH CV ECHO MEAS - EF(CUBED): 80.4 %
BH CV ECHO MEAS - EF(MOD-BP): 58 %
BH CV ECHO MEAS - EF(MOD-SP2): 53.3 %
BH CV ECHO MEAS - EF(MOD-SP4): 60 %
BH CV ECHO MEAS - EF(TEICH): 73.2 %
BH CV ECHO MEAS - ESV(CUBED): 15.4 ML
BH CV ECHO MEAS - ESV(MOD-SP2): 21 ML
BH CV ECHO MEAS - ESV(MOD-SP4): 22 ML
BH CV ECHO MEAS - ESV(TEICH): 22.1 ML
BH CV ECHO MEAS - FS: 42 %
BH CV ECHO MEAS - IVS/LVPW: 0.95
BH CV ECHO MEAS - IVSD: 0.71 CM
BH CV ECHO MEAS - LA DIMENSION: 2.7 CM
BH CV ECHO MEAS - LA/AO: 0.93
BH CV ECHO MEAS - LAD MAJOR: 4.9 CM
BH CV ECHO MEAS - LAT PEAK E' VEL: 11.8 CM/SEC
BH CV ECHO MEAS - LATERAL E/E' RATIO: 5.3
BH CV ECHO MEAS - LV DIASTOLIC VOL/BSA (35-75): 35.1 ML/M^2
BH CV ECHO MEAS - LV MASS(C)D: 92.9 GRAMS
BH CV ECHO MEAS - LV MASS(C)DI: 59.2 GRAMS/M^2
BH CV ECHO MEAS - LV MAX PG: 1.9 MMHG
BH CV ECHO MEAS - LV MEAN PG: 1 MMHG
BH CV ECHO MEAS - LV SYSTOLIC VOL/BSA (12-30): 14 ML/M^2
BH CV ECHO MEAS - LV V1 MAX: 68.6 CM/SEC
BH CV ECHO MEAS - LV V1 MEAN: 49 CM/SEC
BH CV ECHO MEAS - LV V1 VTI: 16.2 CM
BH CV ECHO MEAS - LVIDD: 4.3 CM
BH CV ECHO MEAS - LVIDS: 2.5 CM
BH CV ECHO MEAS - LVLD AP2: 6.4 CM
BH CV ECHO MEAS - LVLD AP4: 7.1 CM
BH CV ECHO MEAS - LVLS AP2: 5.7 CM
BH CV ECHO MEAS - LVLS AP4: 5.6 CM
BH CV ECHO MEAS - LVOT AREA (M): 3.1 CM^2
BH CV ECHO MEAS - LVOT AREA: 3.1 CM^2
BH CV ECHO MEAS - LVOT DIAM: 2 CM
BH CV ECHO MEAS - LVPWD: 0.75 CM
BH CV ECHO MEAS - MED PEAK E' VEL: 10 CM/SEC
BH CV ECHO MEAS - MEDIAL E/E' RATIO: 6.3
BH CV ECHO MEAS - MV A MAX VEL: 34.2 CM/SEC
BH CV ECHO MEAS - MV DEC TIME: 0.27 SEC
BH CV ECHO MEAS - MV E MAX VEL: 62.6 CM/SEC
BH CV ECHO MEAS - MV E/A: 1.8
BH CV ECHO MEAS - PA ACC SLOPE: 361.5 CM/SEC^2
BH CV ECHO MEAS - PA ACC TIME: 0.15 SEC
BH CV ECHO MEAS - PA MAX PG: 1.6 MMHG
BH CV ECHO MEAS - PA PR(ACCEL): 9.5 MMHG
BH CV ECHO MEAS - PA V2 MAX: 62.7 CM/SEC
BH CV ECHO MEAS - RAP SYSTOLE: 3 MMHG
BH CV ECHO MEAS - RVSP: 19 MMHG
BH CV ECHO MEAS - SI(CUBED): 40.5 ML/M^2
BH CV ECHO MEAS - SI(LVOT): 32.4 ML/M^2
BH CV ECHO MEAS - SI(MOD-SP2): 15.3 ML/M^2
BH CV ECHO MEAS - SI(MOD-SP4): 21 ML/M^2
BH CV ECHO MEAS - SI(TEICH): 38.6 ML/M^2
BH CV ECHO MEAS - SV(CUBED): 63.5 ML
BH CV ECHO MEAS - SV(LVOT): 50.9 ML
BH CV ECHO MEAS - SV(MOD-SP2): 24 ML
BH CV ECHO MEAS - SV(MOD-SP4): 33 ML
BH CV ECHO MEAS - SV(TEICH): 60.5 ML
BH CV ECHO MEAS - TAPSE (>1.6): 2.1 CM2
BH CV ECHO MEAS - TR MAX PG: 16 MMHG
BH CV ECHO MEAS - TR MAX VEL: 199.5 CM/SEC
BH CV ECHO MEASUREMENTS AVERAGE E/E' RATIO: 5.74
BH CV VAS BP LEFT ARM: NORMAL MMHG
BH CV XLRA - RV BASE: 3.5 CM
BH CV XLRA - RV LENGTH: 7.1 CM
BH CV XLRA - RV MID: 2.3 CM
BH CV XLRA - TDI S': 13.3 CM/SEC
DEPRECATED RDW RBC AUTO: 40.9 FL (ref 37–54)
EOSINOPHIL # BLD AUTO: 0.06 10*3/MM3 (ref 0–0.4)
EOSINOPHIL NFR BLD AUTO: 1.4 % (ref 0.3–6.2)
ERYTHROCYTE [DISTWIDTH] IN BLOOD BY AUTOMATED COUNT: 11.9 % (ref 12.3–15.4)
HCT VFR BLD AUTO: 40.2 % (ref 34–46.6)
HGB BLD-MCNC: 13.5 G/DL (ref 12–15.9)
IMM GRANULOCYTES # BLD AUTO: 0 10*3/MM3 (ref 0–0.05)
IMM GRANULOCYTES NFR BLD AUTO: 0 % (ref 0–0.5)
LEFT ATRIUM VOLUME INDEX: 24.2 ML/M^2
LEFT ATRIUM VOLUME: 38 ML
LYMPHOCYTES # BLD AUTO: 1.31 10*3/MM3 (ref 0.7–3.1)
LYMPHOCYTES NFR BLD AUTO: 30.1 % (ref 19.6–45.3)
MAXIMAL PREDICTED HEART RATE: 154 BPM
MCH RBC QN AUTO: 31.2 PG (ref 26.6–33)
MCHC RBC AUTO-ENTMCNC: 33.6 G/DL (ref 31.5–35.7)
MCV RBC AUTO: 92.8 FL (ref 79–97)
MONOCYTES # BLD AUTO: 0.33 10*3/MM3 (ref 0.1–0.9)
MONOCYTES NFR BLD AUTO: 7.6 % (ref 5–12)
NEUTROPHILS # BLD AUTO: 2.63 10*3/MM3 (ref 1.7–7)
NEUTROPHILS NFR BLD AUTO: 60.4 % (ref 42.7–76)
NRBC BLD AUTO-RTO: 0 /100 WBC (ref 0–0.2)
PLATELET # BLD AUTO: 194 10*3/MM3 (ref 140–450)
PMV BLD AUTO: 10.1 FL (ref 6–12)
RBC # BLD AUTO: 4.33 10*6/MM3 (ref 3.77–5.28)
STRESS TARGET HR: 131 BPM
WBC NRBC COR # BLD: 4.35 10*3/MM3 (ref 3.4–10.8)

## 2020-02-12 PROCEDURE — 93306 TTE W/DOPPLER COMPLETE: CPT | Performed by: INTERNAL MEDICINE

## 2020-02-12 PROCEDURE — 93306 TTE W/DOPPLER COMPLETE: CPT

## 2020-02-12 PROCEDURE — 36415 COLL VENOUS BLD VENIPUNCTURE: CPT

## 2020-02-12 PROCEDURE — 93000 ELECTROCARDIOGRAM COMPLETE: CPT | Performed by: INTERNAL MEDICINE

## 2020-02-12 PROCEDURE — 99214 OFFICE O/P EST MOD 30 MIN: CPT | Performed by: INTERNAL MEDICINE

## 2020-02-12 PROCEDURE — 85025 COMPLETE CBC W/AUTO DIFF WBC: CPT

## 2020-02-12 NOTE — PROGRESS NOTES
Kiesha Dempsey  1955  [phone]    02/12/2020    [facility] CARDIOLOGY     Tyler Rucker MD  [address] [place] [address]    Chief Complaint   Patient presents with   • Atrial Fibrillation       Problem List:  1. Paroxysmal Atrial Fibrillation  a. CHADSVasc = 2 on Eliquis  b. 24 Hour Holter 3/2018: no atrial fibrillation.   c. Diagnosed at PCP by EKG with symptoms of palpitations 11/15/18  d. Treated with metoprolol - caused low BP and low HR  e. Flecainide started 12/2018 - intolerant due to HA  f. Echocardiogram 2/15/18: EF 60%, mild TR  g. Stress Test 4/9/18: EF 69%, no ischemia. Low risk study.   h. Admit to [facility] ER for Atypical chest pain, Negative markers. Normal EKG Sinus Bradycardia.  i. 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 Frequent UTIs  3. Migraines  4. Colon polyps  5. Chronic nausea  6. Surgical History  a. Hyterectomy  b. Total hip arthroplasty  c. Vein surgery    Allergies  Allergies   Allergen Reactions   • Bactrim [Sulfamethoxazole-Trimethoprim] Hives   • Tramadol Shortness Of Breath, Nausea Only and Other (See Comments)     CHEST PAIN   • Flecainide Other (See Comments)     Headaches         Current Medications    Current Outpatient Medications:   •  apixaban (ELIQUIS) 5 MG tablet tablet, Take 1 tablet by mouth Every 12 (Twelve) Hours., Disp: 60 tablet, Rfl: 11  •  coenzyme Q10 100 MG capsule, Take 100 mg by mouth Daily., Disp: , Rfl:   •  Estriol 10 % cream, Insert 1 mL into the vagina Daily As Needed., Disp: , Rfl: 5  •  topiramate (TOPAMAX) 25 MG capsule, Take 25 mg by mouth 2 (Two) Times a Day., Disp: , Rfl:   •  ZOLMitriptan (ZOMIG) 5 MG tablet, Take 5 mg by mouth 1 (One) Time As Needed for Migraine., Disp: , Rfl:     History of Present Illness     Pt presents for follow up of atrial fibrillation s/p PVA 10/2019. After the ablation, she had a lot of issues with chest pain and trouble eating in the first few weeks after the procedure. She also had  "some abdominal bloating after the procedure and went to the ED. CT abdomen was done and unremarkable.  She had some spells of weakness and slurred speech as well. She saw her PCP who did EKG and it was normal. She did not have a CT of her head.  She continues to have palpitations everyday but not atrial fibrillation, described as skipped beats, with associated SOB. She has LEON as well and states that she is not able to [place] and she was able to do that before the ablation. She states that walking up a hill causes dizziness and breathlessness.  She was in [place] recently and was able to walk 4 miles on flat ground. She denies current CP that she was having just after the ablation. She is eating and drinking fine now. She is on Eliquis without bleeding issues. No hospital visits since the ablation. Her BPs have been running low and so her Cardizem has been stopped.     ROS:  General:  +  fatigue, - weight gain or loss  Cardiovascular:  Denies CP, PND, syncope, near syncope, edema + palpitations.  Pulmonary:  + LEON, -  cough, or wheezing      Vitals:    02/12/20 1029   BP: 100/66   BP Location: Left arm   Patient Position: Sitting   Pulse: 73   SpO2: 95%   Weight: 54 kg (119 lb)   Height: 162.6 cm (64\")     Body mass index is 20.43 kg/m².  PE:  General: NAD  Neck: no JVD, no carotid bruits, no TM  Heart RRR, NL S1, S2, S4 present, no rubs, murmurs  Lungs: CTA, no wheezes, rhonchi, or rales  Abd: soft, non-tender, NL BS  Ext: No musculoskeletal deformities, no edema, cyanosis, or clubbing  Psych: normal mood and affect    Diagnostic Data:    CTA CHEST 10/31/19:   IMPRESSION:  Mild narrowing identified left inferior pulmonary vein at 5  mm. The remainder of the chest is grossly unremarkable. Old healed  granulomatous disease seen within the chest.      ECG 12 Lead  Date/Time: 2/12/2020 10:46 AM  Performed by: Bruce Rodriguez MD  Authorized by: Bruce Rodriguez MD   Comparison: compared " with previous ECG from 12/2/2019  Similar to previous ECG  Rhythm: sinus rhythm  BPM: 57              1. Paroxysmal atrial fibrillation (CMS/HCC)    2. Palpitations          Plan:  1. PAF:  - s/p cryo ablation 10/2019  - no recurrence     2. LEON: check CBC/echocardiogrm today    3. PACs or PVCs: monitor for now    F/up in 6 months    Scribed for Bruce Rodriguez MD by Ester Mayer PA-C. 2/12/2020  10:47 AM     I, Bruce Rodriguez MD, personally performed the services described in this documentation as scribed by the above named individual in my presence, and it is both accurate and complete.  2/12/2020  10:55 AM

## 2020-02-18 ENCOUNTER — TELEPHONE (OUTPATIENT)
Dept: CARDIOLOGY | Facility: CLINIC | Age: 65
End: 2020-02-18

## 2020-02-18 NOTE — TELEPHONE ENCOUNTER
Pt advised of MD task.  She states symptoms are stable.  Will call with any changes    Estrella Wiggins RN    ----- Message from Estrella Wiggins RN sent at 2/18/2020 11:50 AM EST -----  Left message for pt to call     ----- Message -----  From: Bruce Rodriguez MD  Sent: 2/17/2020   6:25 PM EST  To: Estrella Wiggins RN    please call the patient tell her that the echocardiogram was close to normal.  Her blood work was also stable at this time.  We will monitor her symptoms for now and for her to call us if things worsen.

## 2020-05-07 ENCOUNTER — OFFICE VISIT (OUTPATIENT)
Dept: CARDIOLOGY | Facility: CLINIC | Age: 65
End: 2020-05-07

## 2020-05-07 VITALS
HEART RATE: 71 BPM | WEIGHT: 118 LBS | BODY MASS INDEX: 20.14 KG/M2 | HEIGHT: 64 IN | SYSTOLIC BLOOD PRESSURE: 106 MMHG | DIASTOLIC BLOOD PRESSURE: 75 MMHG

## 2020-05-07 DIAGNOSIS — R55 VASOVAGAL SYNCOPE: Primary | ICD-10-CM

## 2020-05-07 DIAGNOSIS — I48.0 PAROXYSMAL ATRIAL FIBRILLATION (HCC): ICD-10-CM

## 2020-05-07 DIAGNOSIS — R00.2 PALPITATIONS: ICD-10-CM

## 2020-05-07 PROCEDURE — 99442 PR PHYS/QHP TELEPHONE EVALUATION 11-20 MIN: CPT | Performed by: PHYSICIAN ASSISTANT

## 2020-05-07 NOTE — PROGRESS NOTES
[facility] at [facility]   OFFICE NOTE    Please note that this is an EMR video /teleconference clinic.  Patient consented to have a video tele-clinic due to the global viral pandemic.  Patient understands that the risk-benefit profile of coming into the office physically is quite unfavorable at this point.        Kiesha Dempsey  1955  PCP: Tyler Rucker MD    SUBJECTIVE:   Kiesha Dempsey is a 65 y.o. female seen for a follow up visit regarding the following:     CC:PAF    HPI:   Spoke with patient on the phone today regarding atrial fibrillation marginal blood pressure.  Patient is pleasant 65-year-old female who underwent successful cryo-pulmonary vein ablation procedure with Dr. Rodriguez in October 2019.  She has significant pain in her groin and abdomen following procedure during that time.  She had a follow-up CT scan her abdomen that was stable with no evidence of any significant disease.  In addition she had a follow-up echocardiogram February revealing normal LV function no pericardial effusion.  She states eventually this pain in her abdomen gradually resolved.  She also had some gastritis which is now resolved.  She is having good days where she can go out and ride a bike for 6 miles no symptoms.  However other day she feels fatigued weak sometimes short of breath.  She feels some this may be due to low blood pressures on days her blood pressure at times can run as low as 90s and sometimes 80 systolic.  She has been trying to increase her fluid intake.  She is not had any syncope events.  She denies any chest pain suggesting an pectoris.  She does not feel she is having recurrent A. fib she does occasionally have palpitation in the evening hours but these are short in duration.    Cardiac PMH: (Old records have been reviewed and summarized below)  1. Paroxysmal Atrial Fibrillation  a. CHADSVasc = 2 on Eliquis  b. 24 Hour Holter 3/2018: no atrial  fibrillation.   c. Diagnosed at PCP by EKG with symptoms of palpitations 11/15/18  d. Treated with metoprolol - caused low BP and low HR  e. Flecainide started 12/2018 - intolerant due to HA  f. Echocardiogram 2/15/18: EF 60%, mild TR  g. Stress Test 4/9/18: EF 69%, no ischemia. Low risk study.   h. Admit to [facility] ER for Atypical chest pain, Negative markers. Normal EKG Sinus Bradycardia.  i. 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 Frequent UTIs  3. Migraines  4. Colon polyps  5. Chronic nausea  6. Surgical History  a. Hyterectomy  b. Total hip arthroplasty  c. Vein surgery       Past Medical History, Past Surgical History, Family history, Social History, and Medications were all reviewed with the patient today and updated as necessary.       Current Outpatient Medications:   •  apixaban (ELIQUIS) 5 MG tablet tablet, Take 1 tablet by mouth Every 12 (Twelve) Hours., Disp: 60 tablet, Rfl: 11  •  coenzyme Q10 100 MG capsule, Take 100 mg by mouth Daily., Disp: , Rfl:   •  Estriol 10 % cream, Insert 1 mL into the vagina Daily As Needed., Disp: , Rfl: 5  •  Multiple Vitamin (MULTI-VITAMIN DAILY PO), Take  by mouth Daily., Disp: , Rfl:   •  NON FORMULARY, Daily. Wellness Formula, Disp: , Rfl:   •  Probiotic Product (PROBIOTIC DAILY PO), Take  by mouth 3 (Three) Times a Week., Disp: , Rfl:   •  topiramate (TOPAMAX) 25 MG capsule, Take 25 mg by mouth 2 (Two) Times a Day., Disp: , Rfl:   •  ZOLMitriptan (ZOMIG) 5 MG tablet, Take 5 mg by mouth 1 (One) Time As Needed for Migraine., Disp: , Rfl:       Allergies   Allergen Reactions   • Bactrim [Sulfamethoxazole-Trimethoprim] Hives   • Tramadol Shortness Of Breath, Nausea Only and Other (See Comments)     CHEST PAIN   • Flecainide Other (See Comments)     Headaches       Patient Active Problem List   Diagnosis   • Precordial pain   • SOB (shortness of breath)   • Palpitations   • Syncope   • Abnormal ECG   • Murmur, cardiac   • Pulmonary hypertension (CMS/HCC)   • Paroxysmal  atrial fibrillation (CMS/HCC)   • Tachy-clementina syndrome (CMS/HCC)   • VICKY (obstructive sleep apnea)     Past Medical History:   Diagnosis Date   • Anesthesia     post anesthesia headahces    • Atrial fibrillation (CMS/HCC)    • Colon polyp    • DDD (degenerative disc disease), lumbar    • Frequent UTI     none recently    • Head concussion    • Kidney infection    • Migraines      Past Surgical History:   Procedure Laterality Date   • CARDIAC ABLATION  10/31/2019   • CARDIAC ELECTROPHYSIOLOGY PROCEDURE N/A 10/31/2019    Procedure: Ablation atrial fibrillation, hold metoprolol 3-5 days prior;  Surgeon: Bruce Rodriguez MD;  Location: [facility] INVASIVE LOCATION;  Service: Cardiovascular   • COLONOSCOPY     • HYSTERECTOMY  2006    bso   • SKIN BIOPSY     • TOTAL HIP ARTHROPLASTY Right 2015   • VEIN SURGERY  2005     Family History   Problem Relation Age of Onset   • Heart attack Mother    • Heart disease Mother         CABG   • Hyperlipidemia Mother    • Stroke Mother    • Hypertension Mother    • COPD Mother    • Emphysema Mother    • Asthma Mother    • Cancer Mother         lung   • Angina Father    • Heart attack Father    • Heart disease Father         CABG,VALVE REPLACEMENT   • Arrhythmia Father         pacemaker   • Mitral valve prolapse Father    • Cancer Father         lung   • Hyperlipidemia Father    • Stroke Father    • Hypertension Father    • Sleep apnea Father    • Narcolepsy Father    • Hypersomnolence Father    • Stroke Brother      Social History     Tobacco Use   • Smoking status: Never Smoker   • Smokeless tobacco: Never Used   Substance Use Topics   • Alcohol use: No       ROS:  Review of Symptoms:  General: no recent weight loss/gain, weakness or fatigue  HEENT: no dizziness, lightheadedness, or vision changes  Respiratory: no cough or hemoptysis  Cardiovascular: no palpitations, and tachycardia  Psychiatric: no depression or excessive stress      PHYSICAL EXAM:    /75 (BP Location: Left  "arm, Patient Position: Sitting)   Pulse 71   Ht 162.6 cm (64\")   Wt 53.5 kg (118 lb)   BMI 20.25 kg/m²        Wt Readings from Last 5 Encounters:   05/07/20 53.5 kg (118 lb)   02/12/20 54 kg (119 lb)   02/12/20 54 kg (119 lb)   12/26/19 54.8 kg (120 lb 12.8 oz)   12/10/19 54.4 kg (120 lb)       BP Readings from Last 5 Encounters:   05/07/20 106/75   02/12/20 100/66   12/26/19 112/60   12/10/19 91/67   12/02/19 103/65       General appearance - Alert, well appearing, and in no distress   Mental status - Affect appropriate to mood.  Psych -  oriented to person, place, and time.    Medical problems and test results were reviewed with the patient today.     No results found for this or any previous visit (from the past 672 hour(s)).    IMPRESSION:CTA 11/19   1. Normal CT angiogram of the abdomen, pelvis, and both thighs.      Echocardiogram 2/12/2020  · Calculated EF = 58.0%.  · Calculated right ventricular systolic pressure from tricuspid regurgitation is 19 mmHg.  · Left ventricular systolic function is normal.  · Interatrial septal defect present with left to right shunting. s/p PVA 10/13/19  · No significant structural or functional valvular disease.       ASSESSMENT   1. PAF: PVA, Cryo ablation 10/19. No recurrent Afib.   2. PAC's and PVC's-Occasional short episodes of palpitations.   3. Marginal SBP: Average BP 90's systolic. Symptomatic on some days.   4. Anticoagulation: Chadsvasc=2, Eliquis 5mg BID    PLAN  · Long discussion with patient regarding symptoms of intermittent fatigue shortness of breath.  It appears that a lot of this correlates with her low blood pressure.  We discussed option of considering adding Florinef therapy.  She would like to initially start aggressive hydration with fluids increase her sodium intake.  · Continue Eliquis therapy  · Return for follow-up in fall 2020 Dr. Rodriguez          This patient has consented to a telehealth visit via Telephone. The visit was scheduled as a " telephone visit to comply with patient safety concerns in accordance with CDC recommendations.  All vitals recorded within this visit are reported by the patient.  I spent 13 minutes in total including but not limited to the 13 minutes spent in direct conversation with this patient.   5/7/2020  10:15    Will Amalia ALMODOVAR

## 2020-05-20 ENCOUNTER — COMMUNICATION - HEALTHEAST (OUTPATIENT)
Dept: SCHEDULING | Facility: CLINIC | Age: 65
End: 2020-05-20

## 2020-06-15 ENCOUNTER — COMMUNICATION - HEALTHEAST (OUTPATIENT)
Dept: SCHEDULING | Facility: CLINIC | Age: 65
End: 2020-06-15

## 2020-06-22 ENCOUNTER — OFFICE VISIT - HEALTHEAST (OUTPATIENT)
Dept: INTERNAL MEDICINE | Facility: CLINIC | Age: 65
End: 2020-06-22

## 2020-06-22 DIAGNOSIS — R10.84 ABDOMINAL PAIN, GENERALIZED: ICD-10-CM

## 2020-06-22 DIAGNOSIS — F41.1 GAD (GENERALIZED ANXIETY DISORDER): ICD-10-CM

## 2020-06-22 ASSESSMENT — PATIENT HEALTH QUESTIONNAIRE - PHQ9: SUM OF ALL RESPONSES TO PHQ QUESTIONS 1-9: 0

## 2020-06-25 ENCOUNTER — COMMUNICATION - HEALTHEAST (OUTPATIENT)
Dept: INTERNAL MEDICINE | Facility: CLINIC | Age: 65
End: 2020-06-25

## 2020-06-29 ENCOUNTER — HOSPITAL ENCOUNTER (OUTPATIENT)
Dept: CT IMAGING | Facility: HOSPITAL | Age: 65
Discharge: HOME | End: 2020-06-29
Attending: INTERNAL MEDICINE

## 2020-06-29 DIAGNOSIS — R10.84 ABDOMINAL PAIN, GENERALIZED: ICD-10-CM

## 2020-07-17 ENCOUNTER — RECORDS - HEALTHEAST (OUTPATIENT)
Dept: INTERNAL MEDICINE | Facility: CLINIC | Age: 65
End: 2020-07-17

## 2020-07-20 ENCOUNTER — COMMUNICATION - HEALTHEAST (OUTPATIENT)
Dept: INTERNAL MEDICINE | Facility: CLINIC | Age: 65
End: 2020-07-20

## 2020-07-23 ENCOUNTER — COMMUNICATION - HEALTHEAST (OUTPATIENT)
Dept: INTERNAL MEDICINE | Facility: CLINIC | Age: 65
End: 2020-07-23

## 2020-07-23 ENCOUNTER — AMBULATORY - HEALTHEAST (OUTPATIENT)
Dept: INTERNAL MEDICINE | Facility: CLINIC | Age: 65
End: 2020-07-23

## 2020-07-23 DIAGNOSIS — R10.9 CHRONIC ABDOMINAL PAIN: ICD-10-CM

## 2020-07-23 DIAGNOSIS — G89.29 CHRONIC ABDOMINAL PAIN: ICD-10-CM

## 2020-07-31 ENCOUNTER — RECORDS - HEALTHEAST (OUTPATIENT)
Dept: ADMINISTRATIVE | Facility: OTHER | Age: 65
End: 2020-07-31

## 2020-09-17 ENCOUNTER — NURSE TRIAGE (OUTPATIENT)
Dept: NURSING | Facility: CLINIC | Age: 65
End: 2020-09-17

## 2020-09-17 NOTE — TELEPHONE ENCOUNTER
States tripped over dog and hit posterior head on pavement. Small open wound 1/2 inch in size, no active bleeding at this time. Localized swelling at site of injury. Describes soreness in jaw area.   Denies any other neurological symptoms at this time. States she remembers entire event and did not lose consciousness.     Advised ED. Plans to go to [facility] ED.    Caroline Matthew RN  [facility]    COVID 19 Nurse Triage Plan/Patient Instructions    Please be aware that novel coronavirus (COVID-19) may be circulating in the community. If you develop symptoms such as fever, cough, or SOB or if you have concerns about the presence of another infection including coronavirus (COVID-19), please contact your health care provider or visit [email or web address].     Disposition/Instructions    ED Visit recommended. Follow protocol based instructions.     Bring Your Own Device:  Please also bring your smart device(s) (smart phones, tablets, laptops) and their charging cables for your personal use and to communicate with your care team during your visit.    Thank you for taking steps to prevent the spread of this virus.  o Limit your contact with others.  o Wear a simple mask to cover your cough.  o Wash your hands well and often.    Resources    [facility]: About COVID-19: [email or web address]    CDC: What to Do If You're Sick: www.cdc.gov/coronavirus/2019-ncov/about/steps-when-sick.html    CDC: Ending Home Isolation: www.cdc.gov/coronavirus/2019-ncov/hcp/disposition-in-home-patients.html     CDC: Caring for Someone: www.cdc.gov/coronavirus/2019-ncov/if-you-are-sick/care-for-someone.html     [facility]: Interim Guidance for Hospital Discharge to Home: www.health.[facility].[place].us/diseases/coronavirus/hcp/hospdischarge.pdf    [facility] clinical trials (COVID-19 research studies): [email or web address]     Below are the COVID-19 hotlines at the [place] Department of [facility] ([facility]).  Interpreters are available.   o For health questions: Call 960-184-4965 or 1-841.840.5585 (7 a.m. to 7 p.m.)  o For questions about schools and childcare: Call 283-908-8950 or 1-246.443.6249 (7 a.m. to 7 p.m.)                       Additional Information    Negative: ACUTE NEUROLOGIC SYMPTOM and symptom present now    Negative: Knocked out (unconscious) > 1 minute    Negative: Seizure (convulsion) occurred (Exception: prior history of seizures and now alert and without Acute Neurologic Symptoms)    Negative: Neck pain after dangerous injury (e.g., MVA, diving, trampoline, contact sports, fall > 10 feet or 3 meters) (Exception: neck pain began > 1 hour after injury)    Negative: Major bleeding (actively dripping or spurting) that can't be stopped    Negative: Penetrating head injury (e.g., knife, gunshot wound, metal object)    Negative: Sounds like a life-threatening emergency to the triager    Negative: Can't remember what happened (amnesia)    Negative: Vomiting once or more    Negative: Watery or blood-tinged fluid dripping from the nose or ears    Negative: ACUTE NEUROLOGIC SYMPTOM and now fine    Negative: Knocked out (unconscious) < 1 minute and now fine    Negative: Severe headache    Negative: Dangerous injury (e.g., MVA, diving, trampoline, contact sports, fall > 10 feet or 3 meters) or severe blow from hard object (e.g., golf club or baseball bat)    Negative: Large swelling or bruise > 2 inches (5 cm)    Negative: Skin is split open or gaping (length > 1/2 inch or 12 mm)    Negative: Bleeding won't stop after 10 minutes of direct pressure (using correct technique)    Negative: One or two 'black eyes' (bruising, purple color of eyelids)    Negative: Taking Coumadin (warfarin) or other strong blood thinner, or known bleeding disorder (e.g., thrombocytopenia)    Age over 65 years with and area of head swelling or bruise    Protocols used: HEAD INJURY-A-[place]

## 2020-09-24 ENCOUNTER — ALLIED HEALTH/NURSE VISIT (OUTPATIENT)
Dept: NURSING | Facility: CLINIC | Age: 65
End: 2020-09-24
Payer: COMMERCIAL

## 2020-09-24 DIAGNOSIS — Z48.02 ENCOUNTER FOR REMOVAL OF SUTURES: Primary | ICD-10-CM

## 2020-09-24 PROCEDURE — 99207 ZZC NO CHARGE NURSE ONLY: CPT

## 2020-09-24 NOTE — PROGRESS NOTES
Maryann Atkinson presents to the clinic for removal of staples and sutures,staples, steri strips. The patient has had sutures and staples in place for 7 days. There has been no patient reported signs or symptoms of infection or drainage. 2  staples and sutures,staples, staple, steri strips are seen and located on the left posterior side of scalp. Tetanus status is up to date. All staples and sutures,staples, steri strips were easily removed today. Routine wound care discussed by the RN or provider. The patient will follow up as needed.    Pt reports she still  Is having some dizziness and brain fog feelings. Advised to make an appointment with her pcp. Yulisa Elder RN

## 2020-12-02 ENCOUNTER — OFFICE VISIT (OUTPATIENT)
Dept: CARDIOLOGY | Facility: CLINIC | Age: 65
End: 2020-12-02

## 2020-12-02 VITALS
BODY MASS INDEX: 20.14 KG/M2 | SYSTOLIC BLOOD PRESSURE: 100 MMHG | DIASTOLIC BLOOD PRESSURE: 72 MMHG | OXYGEN SATURATION: 98 % | HEART RATE: 77 BPM | WEIGHT: 118 LBS | HEIGHT: 64 IN

## 2020-12-02 DIAGNOSIS — I48.0 PAROXYSMAL ATRIAL FIBRILLATION (HCC): Primary | ICD-10-CM

## 2020-12-02 DIAGNOSIS — R00.2 PALPITATIONS: ICD-10-CM

## 2020-12-02 DIAGNOSIS — R06.02 SOB (SHORTNESS OF BREATH): ICD-10-CM

## 2020-12-02 PROCEDURE — 93000 ELECTROCARDIOGRAM COMPLETE: CPT | Performed by: INTERNAL MEDICINE

## 2020-12-02 PROCEDURE — 99213 OFFICE O/P EST LOW 20 MIN: CPT | Performed by: INTERNAL MEDICINE

## 2020-12-02 RX ORDER — CYCLOBENZAPRINE HCL 5 MG
5 TABLET ORAL DAILY
COMMUNITY
End: 2022-01-05

## 2020-12-02 RX ORDER — FLAVOXATE HYDROCHLORIDE 100 MG/1
100 TABLET ORAL 3 TIMES DAILY PRN
COMMUNITY
End: 2021-02-05

## 2020-12-02 NOTE — PROGRESS NOTES
Kiesha Dempsey  1955  [phone]    12/02/2020    [facility] CARDIOLOGY     Referring Provider: No ref. provider found     Tyler Rucker MD  [address] [place] [address]    Chief Complaint   Patient presents with   • Paroxysmal atrial fibrillation (CMS/HCC)       Problem List:   1. Paroxysmal Atrial Fibrillation  a. CHADSVasc = 2 on Eliquis  b. 24 Hour Holter 3/2018: no atrial fibrillation.   c. Diagnosed at PCP by EKG with symptoms of palpitations 11/15/18  d. Treated with metoprolol - caused low BP and low HR  e. Flecainide started 12/2018 - intolerant due to HA  f. Echocardiogram 2/15/18: EF 60%, mild TR  g. Stress Test 4/9/18: EF 69%, no ischemia. Low risk study.   h. Admit to [facility] ER for Atypical chest pain, Negative markers. Normal EKG Sinus Bradycardia.  i. 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 Echocardiogram 2/12/2020: EF 58%, no significant structural or functional valvular disease  2. Frequent UTIs  3. Migraines  4. Colon polyps  5. Chronic nausea  6. Surgical History  a. Hyterectomy  b. Total hip arthroplasty  c. Vein surgery       Allergies  Allergies   Allergen Reactions   • Bactrim [Sulfamethoxazole-Trimethoprim] Hives   • Tramadol Shortness Of Breath, Nausea Only and Other (See Comments)     CHEST PAIN   • Flecainide Other (See Comments)     Headaches         Current Medications    Current Outpatient Medications:   •  apixaban (ELIQUIS) 5 MG tablet tablet, Take 1 tablet by mouth Every 12 (Twelve) Hours., Disp: 60 tablet, Rfl: 11  •  coenzyme Q10 100 MG capsule, Take 100 mg by mouth Daily., Disp: , Rfl:   •  cyclobenzaprine (FLEXERIL) 5 MG tablet, Take 5 mg by mouth Daily., Disp: , Rfl:   •  ELDERBERRY PO, Take 50 mg by mouth Daily., Disp: , Rfl:   •  Estriol 10 % cream, Insert 1 mL into the vagina Daily As Needed., Disp: , Rfl: 5  •  flavoxATE (URISPAS) 100 MG tablet, Take 100 mg by mouth 3 (Three) Times a Day As Needed for Bladder Spasms., Disp: , Rfl:  "  •  hyoscyamine (LEVSIN) 0.125 MG SL tablet, Take 0.125 mg by mouth Daily As Needed for Cramping., Disp: , Rfl:   •  Multiple Vitamin (MULTI-VITAMIN DAILY PO), Take  by mouth Daily., Disp: , Rfl:   •  NON FORMULARY, Daily. Wellness Formula, Disp: , Rfl:   •  Probiotic Product (PROBIOTIC DAILY PO), Take  by mouth 3 (Three) Times a Week., Disp: , Rfl:   •  topiramate (TOPAMAX) 25 MG capsule, Take 25 mg by mouth Daily., Disp: , Rfl:   •  ZOLMitriptan (ZOMIG) 5 MG tablet, Take 5 mg by mouth 1 (One) Time As Needed for Migraine., Disp: , Rfl:     History of Present Illness     Pt presents for follow up of atrial fibrillation, PVCs/PACs, dyspnea. Since we last saw the pt, she denies any atrial fibrillation except for rare palpitations on occasion,lasting just a second or two, usually occurring at night.  Her SOB has resided. She had normal echocardiogram in February. She has been active cycling and swimming. No CP. She has lightheadedness with low BP but denies syncope. She is not drinking as much water as she should. Denies bleeding issues on Eliquis, or TIA/CVA symptoms. Overall feels well. She did go to the ED in May around Memorial Day weekend for confusion and had normal workup with rule out for CVA. She is now seeing a neurologist who thinks it may be a migraine.     ROS:  General:  Denies fatigue, weight gain or loss  Cardiovascular:  Denies CP, PND, syncope, near syncope, edema + palpitations.  Pulmonary:  Denies LEON, cough, or wheezing      Vitals:    12/02/20 1303   BP: 100/72   BP Location: Left arm   Patient Position: Sitting   Pulse: 77   SpO2: 98%   Weight: 53.5 kg (118 lb)   Height: 162.6 cm (64\")     Body mass index is 20.25 kg/m².  PE:  General: NAD. A & O x 3   Neck: no JVD, no carotid bruits, no TM  Heart RRR, NL S1, S2, S4 present, no rubs, murmurs  Lungs: CTA, no wheezes, rhonchi, or rales  Abd: soft, non-tender, NL BS  Ext: No musculoskeletal deformities, no edema, cyanosis, or clubbing  Psych: " normal mood and affect    Diagnostic Data:        ECG 12 Lead    Date/Time: 12/2/2020 1:23 PM  Performed by: Bruce Rodriguez MD  Authorized by: Bruce Rodriguez MD   Comparison: compared with previous ECG from 2/12/2020  Similar to previous ECG  Rhythm: sinus rhythm  BPM: 72              1. Paroxysmal atrial fibrillation (CMS/HCC)    2. SOB (shortness of breath)    3. Palpitations          Plan:  1. PAF:  - s/p cryo ablation 10/2019  - no recurrence off AAD  - CHADSvasc = 1 (age) on Eliquis, will switch to ASA 81 mg po daily and use prn eliquis     2. LEON:   - improved, normal echocardiogram     3. PACs or PVCs: monitor for now, overall not limiting to daily activity    F/up in 12 months    Scribed for Bruce Rodriguez MD by Ester Mayer PA-C. 12/2/2020  13:25 EST     Bruce LUCIO MD, personally performed the services described in this documentation as scribed by the above named individual in my presence, and it is both accurate and complete.  12/2/2020  13:29 EST

## 2021-01-21 ENCOUNTER — RECORDS - HEALTHEAST (OUTPATIENT)
Dept: MAMMOGRAPHY | Facility: CLINIC | Age: 66
End: 2021-01-21

## 2021-01-21 DIAGNOSIS — Z12.31 ENCOUNTER FOR SCREENING MAMMOGRAM FOR MALIGNANT NEOPLASM OF BREAST: ICD-10-CM

## 2021-01-22 ENCOUNTER — RECORDS - HEALTHEAST (OUTPATIENT)
Dept: ADMINISTRATIVE | Facility: OTHER | Age: 66
End: 2021-01-22

## 2021-02-01 ENCOUNTER — TELEPHONE (OUTPATIENT)
Dept: CARDIOLOGY | Facility: HOSPITAL | Age: 66
End: 2021-02-01

## 2021-02-01 NOTE — TELEPHONE ENCOUNTER
[facility] Telephone Note for:    Kiesha Dempsey, 1955  Home Phone [phone]   Mobile [phone]       Reason for Call:  Patient called with concerns about palpitations and chest pain.    Symptoms:  Chest pain, palpitations, and dizziness    Other Pertinent Information (Weight, Vitals, etc.):  Patient states that she was taken off of Eliquis in Dec.

## 2021-02-03 NOTE — TELEPHONE ENCOUNTER
Patient notes intermittent palpitations over the past few weeks. Patient also notes intermittent chest pain while walking on treadmill. Notes significant fatigue over the last few weeks. Patient to be seen in [facility] Friday for evaluation.

## 2021-02-05 ENCOUNTER — OFFICE VISIT (OUTPATIENT)
Dept: CARDIOLOGY | Facility: HOSPITAL | Age: 66
End: 2021-02-05

## 2021-02-05 ENCOUNTER — HOSPITAL ENCOUNTER (OUTPATIENT)
Dept: CARDIOLOGY | Facility: HOSPITAL | Age: 66
Discharge: HOME | End: 2021-02-05

## 2021-02-05 VITALS
DIASTOLIC BLOOD PRESSURE: 55 MMHG | WEIGHT: 118.5 LBS | BODY MASS INDEX: 20.23 KG/M2 | TEMPERATURE: 98.7 F | HEART RATE: 75 BPM | OXYGEN SATURATION: 100 % | RESPIRATION RATE: 18 BRPM | SYSTOLIC BLOOD PRESSURE: 92 MMHG | HEIGHT: 64 IN

## 2021-02-05 DIAGNOSIS — R06.09 DOE (DYSPNEA ON EXERTION): ICD-10-CM

## 2021-02-05 DIAGNOSIS — R07.2 PRECORDIAL PAIN: Primary | ICD-10-CM

## 2021-02-05 DIAGNOSIS — I48.0 PAROXYSMAL ATRIAL FIBRILLATION (HCC): ICD-10-CM

## 2021-02-05 DIAGNOSIS — R07.2 PRECORDIAL PAIN: ICD-10-CM

## 2021-02-05 LAB
QT INTERVAL: 376 MS
QTC INTERVAL: 417 MS

## 2021-02-05 PROCEDURE — 93010 ELECTROCARDIOGRAM REPORT: CPT | Performed by: INTERNAL MEDICINE

## 2021-02-05 PROCEDURE — 93005 ELECTROCARDIOGRAM TRACING: CPT | Performed by: NURSE PRACTITIONER

## 2021-02-05 PROCEDURE — 99214 OFFICE O/P EST MOD 30 MIN: CPT | Performed by: NURSE PRACTITIONER

## 2021-02-05 RX ORDER — ASPIRIN 81 MG/1
81 TABLET ORAL DAILY
COMMUNITY

## 2021-02-08 NOTE — PROGRESS NOTES
"Chief Complaint  Palpitations, Chest Pain, and Follow-up    Subjective    History of Present Illness {CC  Problem List  Visit  Diagnosis   Encounters  Notes  Medications  Labs  Result Review Imaging  Media :23}       History of Present Illness   65 year old female presents to the office today for ongoing evaluation of her recent chest pain and LEON. She notes that she has been under a lot of stress lately caring for her  who has prostrate cancer. She reports that she has been experiencing chest pain located in left chest for the past few weeks. She reports chest pain worsens with exertion. She notes that most recently she began experiencing chest pain while on treadmill. Chest pain did not radiate but did have associated dyspnea, fatigue. Notes occasional palpitations. Denies presyncope, syncope, orthopnea,pnd, pedal edema.   Objective     Vital Signs:   Vitals:    02/05/21 1417   BP: 92/55   BP Location: Left arm   Patient Position: Sitting   Cuff Size: Adult   Pulse: 75   Resp: 18   Temp: 98.7 °F (37.1 °C)   TempSrc: Temporal   SpO2: 100%   Weight: 53.8 kg (118 lb 8 oz)   Height: 162.6 cm (64\")     Body mass index is 20.34 kg/m².  Physical Exam  Vitals signs and nursing note reviewed.   Constitutional:       Appearance: Normal appearance.   HENT:      Head: Normocephalic.   Eyes:      Pupils: Pupils are equal, round, and reactive to light.   Neck:      Musculoskeletal: Normal range of motion.   Cardiovascular:      Rate and Rhythm: Normal rate and regular rhythm.      Pulses: Normal pulses.      Heart sounds: Normal heart sounds. No murmur.   Pulmonary:      Effort: Pulmonary effort is normal.      Breath sounds: Normal breath sounds.   Abdominal:      General: Bowel sounds are normal.      Palpations: Abdomen is soft.   Musculoskeletal: Normal range of motion.      Right lower leg: No edema.      Left lower leg: No edema.   Skin:     General: Skin is warm and dry.      Capillary Refill: Capillary " refill takes less than 2 seconds.   Neurological:      Mental Status: She is alert and oriented to person, place, and time.   Psychiatric:         Mood and Affect: Mood normal.         Thought Content: Thought content normal.              Result Review  Data Reviewed:{ Labs  Result Review  Imaging  Med Tab  Media :23}   CBC & Differential (02/12/2020 12:09)  Urinalysis With Microscopic If Indicated (No Culture) - Urine, Clean Catch (11/03/2019 23:13)  Lipase (11/03/2019 22:11)  Protime-INR (11/03/2019 22:11)  Comprehensive Metabolic Panel (11/03/2019 22:11)  CBC & Differential (11/03/2019 22:11)  Hemoglobin & Hematocrit, Blood (11/01/2019 13:37)  Basic Metabolic Panel (11/01/2019 07:07)  ECG 12 Lead (02/05/2021 14:24)               Assessment and Plan {CC Problem List  Visit Diagnosis  ROS  Review (Popup)  Health Maintenance  Quality  BestPractice  Medications  SmartSets  SnapShot Encounters  Media :23}   1. Precordial pain  Marco A score: 2  - ECG 12 Lead; Future  - Adult Stress Echo W/ Cont or Stress Agent if Necessary Per Protocol; Future    2. Paroxysmal atrial fibrillation (CMS/HCC)  Maintaining nsr  S/p PVA  CHADS-VASc Risk Assessment            2       Total Score        1 Age 65-74    1 Sex: Female        Criteria that do not apply:    CHF    Hypertension    Age >/= 75    DM    PRIOR STROKE/TIA/THROMBO    Vascular Disease          - ECG 12 Lead; Future  - Adult Stress Echo W/ Cont or Stress Agent if Necessary Per Protocol; Future    3. LEON (dyspnea on exertion)    - Adult Stress Echo W/ Cont or Stress Agent if Necessary Per Protocol; Future          Follow Up {Instructions Charge Capture  Follow-up Communications :23}   Return if symptoms worsen or fail to improve.    Patient was given instructions and counseling regarding her condition or for health maintenance advice. Please see specific information pulled into the AVS if appropriate.  Patient was instructed to call the [facility] with any questions, concerns, or worsening symptoms.    *Please note that portions of this note were completed with a voice recognition program. Efforts were made to edit the dictations, but occasionally words are mistranscribed.

## 2021-02-21 ENCOUNTER — APPOINTMENT (OUTPATIENT)
Dept: PREADMISSION TESTING | Facility: HOSPITAL | Age: 66
End: 2021-02-21

## 2021-02-21 PROCEDURE — U0005 INFEC AGEN DETEC AMPLI PROBE: HCPCS

## 2021-02-21 PROCEDURE — U0004 COV-19 TEST NON-CDC HGH THRU: HCPCS

## 2021-02-21 PROCEDURE — C9803 HOPD COVID-19 SPEC COLLECT: HCPCS

## 2021-02-22 LAB — SARS-COV-2 RNA RESP QL NAA+PROBE: NOT DETECTED

## 2021-02-23 ENCOUNTER — TELEPHONE (OUTPATIENT)
Dept: CARDIOLOGY | Facility: HOSPITAL | Age: 66
End: 2021-02-23

## 2021-02-24 ENCOUNTER — TELEPHONE (OUTPATIENT)
Dept: CARDIOLOGY | Facility: HOSPITAL | Age: 66
End: 2021-02-24

## 2021-02-24 ENCOUNTER — HOSPITAL ENCOUNTER (OUTPATIENT)
Dept: CARDIOLOGY | Facility: HOSPITAL | Age: 66
Discharge: HOME | End: 2021-02-24
Admitting: NURSE PRACTITIONER

## 2021-02-24 VITALS
DIASTOLIC BLOOD PRESSURE: 76 MMHG | HEIGHT: 64 IN | WEIGHT: 118 LBS | BODY MASS INDEX: 20.14 KG/M2 | HEART RATE: 67 BPM | SYSTOLIC BLOOD PRESSURE: 108 MMHG

## 2021-02-24 DIAGNOSIS — R07.2 PRECORDIAL PAIN: ICD-10-CM

## 2021-02-24 DIAGNOSIS — I48.0 PAROXYSMAL ATRIAL FIBRILLATION (HCC): ICD-10-CM

## 2021-02-24 DIAGNOSIS — R06.09 DOE (DYSPNEA ON EXERTION): ICD-10-CM

## 2021-02-24 LAB
BH CV ECHO MEAS - AO ROOT AREA (BSA CORRECTED): 2.1
BH CV ECHO MEAS - AO ROOT AREA: 8.4 CM^2
BH CV ECHO MEAS - AO ROOT DIAM: 3.3 CM
BH CV ECHO MEAS - ASC AORTA: 3.3 CM
BH CV ECHO MEAS - BSA(HAYCOCK): 1.6 M^2
BH CV ECHO MEAS - BSA: 1.6 M^2
BH CV ECHO MEAS - BZI_BMI: 20.3 KILOGRAMS/M^2
BH CV ECHO MEAS - BZI_METRIC_HEIGHT: 162.6 CM
BH CV ECHO MEAS - BZI_METRIC_WEIGHT: 53.5 KG
BH CV ECHO MEAS - EDV(CUBED): 68.9 ML
BH CV ECHO MEAS - EDV(MOD-SP2): 49 ML
BH CV ECHO MEAS - EDV(MOD-SP4): 64 ML
BH CV ECHO MEAS - EDV(TEICH): 74.2 ML
BH CV ECHO MEAS - EF(CUBED): 77.8 %
BH CV ECHO MEAS - EF(MOD-BP): 58 %
BH CV ECHO MEAS - EF(MOD-SP2): 51 %
BH CV ECHO MEAS - EF(MOD-SP4): 60.9 %
BH CV ECHO MEAS - EF(TEICH): 70.4 %
BH CV ECHO MEAS - ESV(CUBED): 15.3 ML
BH CV ECHO MEAS - ESV(MOD-SP2): 24 ML
BH CV ECHO MEAS - ESV(MOD-SP4): 25 ML
BH CV ECHO MEAS - ESV(TEICH): 22 ML
BH CV ECHO MEAS - FS: 39.4 %
BH CV ECHO MEAS - IVS/LVPW: 0.95
BH CV ECHO MEAS - IVSD: 0.78 CM
BH CV ECHO MEAS - LA DIMENSION: 2.5 CM
BH CV ECHO MEAS - LA/AO: 0.78
BH CV ECHO MEAS - LV DIASTOLIC VOL/BSA (35-75): 40.9 ML/M^2
BH CV ECHO MEAS - LV MASS(C)D: 98 GRAMS
BH CV ECHO MEAS - LV MASS(C)DI: 62.7 GRAMS/M^2
BH CV ECHO MEAS - LV SYSTOLIC VOL/BSA (12-30): 16 ML/M^2
BH CV ECHO MEAS - LVIDD: 4.1 CM
BH CV ECHO MEAS - LVIDS: 2.5 CM
BH CV ECHO MEAS - LVLD AP2: 6.3 CM
BH CV ECHO MEAS - LVLD AP4: 7.1 CM
BH CV ECHO MEAS - LVLS AP2: 5.8 CM
BH CV ECHO MEAS - LVLS AP4: 6.1 CM
BH CV ECHO MEAS - LVOT AREA (M): 2.8 CM^2
BH CV ECHO MEAS - LVOT AREA: 2.9 CM^2
BH CV ECHO MEAS - LVOT DIAM: 1.9 CM
BH CV ECHO MEAS - LVPWD: 0.83 CM
BH CV ECHO MEAS - RAP SYSTOLE: 3 MMHG
BH CV ECHO MEAS - RVSP: 20 MMHG
BH CV ECHO MEAS - SI(CUBED): 34.3 ML/M^2
BH CV ECHO MEAS - SI(MOD-SP2): 16 ML/M^2
BH CV ECHO MEAS - SI(MOD-SP4): 25 ML/M^2
BH CV ECHO MEAS - SI(TEICH): 33.4 ML/M^2
BH CV ECHO MEAS - SV(CUBED): 53.6 ML
BH CV ECHO MEAS - SV(MOD-SP2): 25 ML
BH CV ECHO MEAS - SV(MOD-SP4): 39 ML
BH CV ECHO MEAS - SV(TEICH): 52.3 ML
BH CV ECHO MEAS - TR MAX PG: 17 MMHG
BH CV ECHO MEAS - TR MAX VEL: 204.1 CM/SEC
BH CV STRESS BP STAGE 1: NORMAL
BH CV STRESS BP STAGE 2: NORMAL
BH CV STRESS BP STAGE 3: NORMAL
BH CV STRESS DURATION MIN STAGE 1: 3
BH CV STRESS DURATION MIN STAGE 2: 3
BH CV STRESS DURATION MIN STAGE 3: 2
BH CV STRESS DURATION SEC STAGE 1: 0
BH CV STRESS DURATION SEC STAGE 2: 0
BH CV STRESS DURATION SEC STAGE 3: 24
BH CV STRESS GRADE STAGE 1: 10
BH CV STRESS GRADE STAGE 2: 12
BH CV STRESS GRADE STAGE 3: 14
BH CV STRESS HR STAGE 1: 97
BH CV STRESS HR STAGE 2: 117
BH CV STRESS HR STAGE 3: 137
BH CV STRESS METS STAGE 1: 5
BH CV STRESS METS STAGE 2: 7.5
BH CV STRESS METS STAGE 3: 10
BH CV STRESS O2 STAGE 1: 100
BH CV STRESS O2 STAGE 2: 100
BH CV STRESS O2 STAGE 3: 100
BH CV STRESS PROTOCOL 1: NORMAL
BH CV STRESS RECOVERY BP: NORMAL MMHG
BH CV STRESS RECOVERY HR: 72 BPM
BH CV STRESS RECOVERY O2: 99 %
BH CV STRESS SPEED STAGE 1: 1.7
BH CV STRESS SPEED STAGE 2: 2.5
BH CV STRESS SPEED STAGE 3: 3.4
BH CV STRESS STAGE 1: 1
BH CV STRESS STAGE 2: 2
BH CV STRESS STAGE 3: 3
BH CV VAS BP LEFT ARM: NORMAL MMHG
MAXIMAL PREDICTED HEART RATE: 153 BPM
PERCENT MAX PREDICTED HR: 90.85 %
STRESS BASELINE BP: NORMAL MMHG
STRESS BASELINE HR: 63 BPM
STRESS O2 SAT REST: 100 %
STRESS PERCENT HR: 107 %
STRESS POST ESTIMATED WORKLOAD: 10.4 METS
STRESS POST EXERCISE DUR MIN: 8 MIN
STRESS POST EXERCISE DUR SEC: 24 SEC
STRESS POST O2 SAT PEAK: 100 %
STRESS POST PEAK BP: NORMAL MMHG
STRESS POST PEAK HR: 139 BPM
STRESS TARGET HR: 130 BPM

## 2021-02-24 PROCEDURE — 93325 DOPPLER ECHO COLOR FLOW MAPG: CPT | Performed by: INTERNAL MEDICINE

## 2021-02-24 PROCEDURE — 93352 ADMIN ECG CONTRAST AGENT: CPT | Performed by: INTERNAL MEDICINE

## 2021-02-24 PROCEDURE — 93320 DOPPLER ECHO COMPLETE: CPT | Performed by: INTERNAL MEDICINE

## 2021-02-24 PROCEDURE — 93350 STRESS TTE ONLY: CPT

## 2021-02-24 PROCEDURE — 93350 STRESS TTE ONLY: CPT | Performed by: INTERNAL MEDICINE

## 2021-02-24 PROCEDURE — 93017 CV STRESS TEST TRACING ONLY: CPT

## 2021-02-24 PROCEDURE — 25010000002 SULFUR HEXAFLUORIDE MICROSPH 60.7-25 MG RECONSTITUTED SUSPENSION: Performed by: NURSE PRACTITIONER

## 2021-02-24 PROCEDURE — 93018 CV STRESS TEST I&R ONLY: CPT | Performed by: INTERNAL MEDICINE

## 2021-02-24 RX ADMIN — SULFUR HEXAFLUORIDE 5 ML: KIT at 10:20

## 2021-02-24 NOTE — TELEPHONE ENCOUNTER
Reviewed stress echo with patient.Stress echo within normal limits. Continue current plan of care.

## 2021-03-16 ENCOUNTER — COMMUNICATION - HEALTHEAST (OUTPATIENT)
Dept: INTERNAL MEDICINE | Facility: CLINIC | Age: 66
End: 2021-03-16

## 2021-03-16 DIAGNOSIS — F41.1 GAD (GENERALIZED ANXIETY DISORDER): ICD-10-CM

## 2021-04-08 ENCOUNTER — OFFICE VISIT - HEALTHEAST (OUTPATIENT)
Dept: INTERNAL MEDICINE | Facility: CLINIC | Age: 66
End: 2021-04-08

## 2021-04-08 DIAGNOSIS — Z80.0 FAMILY HISTORY OF ESOPHAGEAL CANCER: ICD-10-CM

## 2021-04-08 DIAGNOSIS — K21.00 GASTROESOPHAGEAL REFLUX DISEASE WITH ESOPHAGITIS WITHOUT HEMORRHAGE: ICD-10-CM

## 2021-04-08 ASSESSMENT — MIFFLIN-ST. JEOR: SCORE: 1162.51

## 2021-04-12 ENCOUNTER — COMMUNICATION - HEALTHEAST (OUTPATIENT)
Dept: INTERNAL MEDICINE | Facility: CLINIC | Age: 66
End: 2021-04-12

## 2021-04-12 DIAGNOSIS — K21.00 GASTROESOPHAGEAL REFLUX DISEASE WITH ESOPHAGITIS WITHOUT HEMORRHAGE: ICD-10-CM

## 2021-05-04 ENCOUNTER — RECORDS - HEALTHEAST (OUTPATIENT)
Dept: ADMINISTRATIVE | Facility: OTHER | Age: 66
End: 2021-05-04

## 2021-05-18 ENCOUNTER — COMMUNICATION - HEALTHEAST (OUTPATIENT)
Dept: INTERNAL MEDICINE | Facility: CLINIC | Age: 66
End: 2021-05-18

## 2021-05-18 DIAGNOSIS — K21.00 GASTROESOPHAGEAL REFLUX DISEASE WITH ESOPHAGITIS WITHOUT HEMORRHAGE: ICD-10-CM

## 2021-05-21 ENCOUNTER — COMMUNICATION - HEALTHEAST (OUTPATIENT)
Dept: INTERNAL MEDICINE | Facility: CLINIC | Age: 66
End: 2021-05-21

## 2021-05-21 DIAGNOSIS — K21.00 GASTROESOPHAGEAL REFLUX DISEASE WITH ESOPHAGITIS WITHOUT HEMORRHAGE: ICD-10-CM

## 2021-05-25 ENCOUNTER — RECORDS - HEALTHEAST (OUTPATIENT)
Dept: ADMINISTRATIVE | Facility: CLINIC | Age: 66
End: 2021-05-25

## 2021-05-27 ENCOUNTER — RECORDS - HEALTHEAST (OUTPATIENT)
Dept: ADMINISTRATIVE | Facility: CLINIC | Age: 66
End: 2021-05-27

## 2021-05-27 ASSESSMENT — PATIENT HEALTH QUESTIONNAIRE - PHQ9: SUM OF ALL RESPONSES TO PHQ QUESTIONS 1-9: 0

## 2021-05-27 NOTE — TELEPHONE ENCOUNTER
Controlled Substance Refill Request  Medication:   Requested Prescriptions     Pending Prescriptions Disp Refills     LORazepam (ATIVAN) 0.5 MG tablet [Pharmacy Med Name: LORAZEPAM 0.5MG TABLETS] 30 tablet 0     Sig: TAKE 1 TABLET(0.5 MG) BY MOUTH TWICE DAILY     Date Last Fill: 2/8/19  Pharmacy: [id number]   Submit electronically to pharmacy  Controlled Substance Agreement on File:   Encounter-Level CSA Scan Date:    There are no encounter-level csa scan date.       Last office visit: Last office visit pertaining to requested medication was 10/2/18.

## 2021-05-27 NOTE — PROGRESS NOTES
"  Office Visit - Follow Up   Maryann Atkinson   64 y.o. female    Date of Visit: 4/16/2019    Chief Complaint   Patient presents with     Shaking     Feels like BS drops and gets shakey x 3 weeks, Muscles feel tired at times, fasting for lab work         Assessment and Plan   1. Visit for screening mammogram    - Mammo Screening Bilateral; Future    2. Myalgia    - HM2(CBC w/o Differential)  - Comprehensive Metabolic Panel  - CK Total  - Erythrocyte Sedimentation Rate    3. Chronic fatigue    - HM2(CBC w/o Differential)  - Comprehensive Metabolic Panel  - Thyroid Stimulating Hormone (TSH)    4. Mixed hyperlipidemia    - Lipid Cascade          No follow-ups on file.     History of Present Illness   This 64 y.o. old planes that on average about twice per week she feels weak and shaky.  This usually occurs later on the day and not early in the morning or after breakfast.  Off-and-on she feels tired with muscle weakness.  It is definitely better with walking and not worse with walking.  She reports no fevers or chills.  She wonders whether she is hypoglycemic.  We discussed fasting hypoglycemia which does not seem to be likely here.  We discussed reactive hypoglycemia this is not following a definite pattern right now.  I told her we should check these laboratory studies above.    Review of Systems: A comprehensive review of systems was negative except as noted.     Medications, Allergies and Problem List   Reviewed, reconciled and updated     Physical Exam   General Appearance:       /80 (Patient Site: Right Arm, Patient Position: Sitting, Cuff Size: Adult Large)   Pulse 82   Ht 5' 4.5\" (1.638 m)   Wt 145 lb (65.8 kg)   SpO2 99%   BMI 24.50 kg/m      Thyroid gland is not enlarged blood pressure is normal.  Normal motor strength in her upper extremities.  Gait is normal.  Gets out of a chair easily.  Weight is up 3 pounds.  Blood pressure is excellent.     Additional Information   Current Outpatient " Medications   Medication Sig Dispense Refill     clotrimazole-betamethasone (LOTRISONE) cream Sparingly to affected area on abdomen 30 g 0     LORazepam (ATIVAN) 0.5 MG tablet TAKE 1 TABLET(0.5 MG) BY MOUTH TWICE DAILY 30 tablet 0     OXYMETAZOLINE HCL (AFRIN, OXYMETAZOLINE, NASL) Use as directed.       ranitidine (ZANTAC) 150 MG tablet TAKE ONE TABLET (150MG TOTAL) BY MOUTH AT BEDTIME (Patient taking differently: Take 150 mg by mouth as needed. TAKE ONE TABLET (150MG TOTAL) BY MOUTH AT BEDTIME      ) 90 tablet 3     zaleplon (SONATA) 10 MG capsule Take 10 mg by mouth as needed.       No current facility-administered medications for this visit.      Allergies   Allergen Reactions     Azithromycin Itching     Social History     Tobacco Use     Smoking status: Never Smoker     Smokeless tobacco: Never Used   Substance Use Topics     Alcohol use: Not on file     Drug use: Not on file       Review and/or order of clinical lab tests:  Review and/or order of radiology tests:  Review and/or order of medicine tests:  Discussion of test results with performing physician:  Decision to obtain old records and/or obtain history from someone other than the patient:  Review and summarization of old records and/or obtaining history from someone other than the patient and.or discussion of case with another health care provider:  Independent visualization of image, tracing or specimen itself:    Time: total time spent with the patient was 15 minutes of which >50% was spent in counseling and coordination of care     Jordon Jay MD

## 2021-05-28 ENCOUNTER — RECORDS - HEALTHEAST (OUTPATIENT)
Dept: ADMINISTRATIVE | Facility: CLINIC | Age: 66
End: 2021-05-28

## 2021-05-29 NOTE — TELEPHONE ENCOUNTER
Called pharmacist alternative they have similar to Garamycin is Neomycin polymyxin dexamethasone ointment.      Apply two times a day per PCP

## 2021-05-29 NOTE — TELEPHONE ENCOUNTER
Medication Question or Clarification  Who is calling: Pharmacy: [facility]  What medication are you calling about? (include dose and sig) Pharmacist is asking for an alternative.  The ordered medication Garamycin is not in the area and will not be available until July.  The pharmacist offers erythromycin if appropriate. Please advise.   Who prescribed the medication?: Dr Royal  What is your question/concern?: see above  Pharmacy: [facility]  Okay to leave a detailed message?: Yes   [phone]  Site CMT - Please call the pharmacy to obtain any additional needed information.

## 2021-05-29 NOTE — TELEPHONE ENCOUNTER
RN cannot approve Refill Request    RN can NOT refill this medication med is not covered by policy/route to provider.    Juan José Deleon, [facility] Triage/Med Refill 6/12/2019    Requested Prescriptions   Pending Prescriptions Disp Refills     clotrimazole-betamethasone (LOTRISONE) cream [Pharmacy Med Name: CLOTRIMAZOLE-BETAMETHASONE CRM 15GM] 30 g 0     Sig: APPLY SPARINGLY TO THE AFFECTED AREA ON ABDOMEN       There is no refill protocol information for this order

## 2021-05-29 NOTE — PROGRESS NOTES
Office Visit - Follow up    Maryann Atkinson   64 y.o. female    Date of Visit: 5/30/2019    Chief Complaint   Patient presents with     Eye Problem     RT eye X1 week swollen - slightly painful       Subjective: The patient of Dr. Jay with concerns regarding discomfort and small punctate areas of irritation right lower anterior eyelid.  No recent trauma.    ROS: A comprehensive review of systems was performed and was otherwise negative except as mentioned above.     Exam  Exam reveals hordeolum involving the right lower eyelid there is minimal evidence of scleritis or conjunctivitis.  Minimal drainage.       /64 (Patient Site: Left Arm, Patient Position: Sitting, Cuff Size: Adult Regular)   Pulse 82   Wt 145 lb (65.8 kg)   SpO2 98%   Breastfeeding? No   BMI 24.50 kg/m      Assessment and Plan  Treat with hot packs and antibacterial ointment follow-up with ophthalmologist if not dramatically improved with the next 3 to 5 days    Maryann was seen today for eye problem.    Diagnoses and all orders for this visit:    Hordeolum externum of right lower eyelid  -     gentamicin (GARAMYCIN) 0.3 % (3 mg/gram) ophthalmic ointment; Apply bid3.5          Time: total time spent with the patient was 15 minutes of which >50% was spent in counseling and coordination of care        Allergies   Allergen Reactions     Azithromycin Itching       Medications :  Prior to Admission medications    Medication Sig Start Date End Date Taking? Authorizing Provider   clotrimazole-betamethasone (LOTRISONE) cream Sparingly to affected area on abdomen 5/21/18  Yes Jordon Jay MD   LORazepam (ATIVAN) 0.5 MG tablet TAKE 1 TABLET(0.5 MG) BY MOUTH TWICE DAILY 4/5/19  Yes Jordon Jay MD   OXYMETAZOLINE HCL (AFRIN, OXYMETAZOLINE, NASL) Use as directed.   Yes PROVIDER, HISTORICAL   ranitidine (ZANTAC) 150 MG tablet TAKE ONE TABLET (150MG TOTAL) BY MOUTH AT BEDTIME  Patient taking differently: Take 150 mg by mouth as  needed. TAKE ONE TABLET (150MG TOTAL) BY MOUTH AT BEDTIME       4/18/16  Yes Jordon Jay MD   zaleplon (SONATA) 10 MG capsule Take 10 mg by mouth as needed. 7/5/16  Yes PROVIDER, HISTORICAL   gentamicin (GARAMYCIN) 0.3 % (3 mg/gram) ophthalmic ointment Apply bid3.5 5/30/19 6/9/19  Levar Mo MD        Past Medical History: No past medical history on file.    Past Surgical History:   Past Surgical History:   Procedure Laterality Date     BREAST BIOPSY Left      HYSTERECTOMY       OOPHORECTOMY       [place] TOTAL ABDOM HYSTERECTOMY      Description: Total Abdominal Hysterectomy;  Recorded: 02/17/2011;  Comments: done 2001       Social History:   Social History     Socioeconomic History     Marital status:      Spouse name: Not on file     Number of children: Not on file     Years of education: Not on file     Highest education level: Not on file   Occupational History     Not on file   Social Needs     Financial resource strain: Not on file     Food insecurity:     Worry: Not on file     Inability: Not on file     Transportation needs:     Medical: Not on file     Non-medical: Not on file   Tobacco Use     Smoking status: Never Smoker     Smokeless tobacco: Never Used   Substance and Sexual Activity     Alcohol use: Not on file     Drug use: Not on file     Sexual activity: Not on file   Lifestyle     Physical activity:     Days per week: Not on file     Minutes per session: Not on file     Stress: Not on file   Relationships     Social connections:     Talks on phone: Not on file     Gets together: Not on file     Attends [employer] service: Not on file     Active member of club or organization: Not on file     Attends meetings of clubs or organizations: Not on file     Relationship status: Not on file     Intimate partner violence:     Fear of current or ex partner: Not on file     Emotionally abused: Not on file     Physically abused: Not on file     Forced sexual activity: Not on file   Other  Topics Concern     Not on file   Social History Narrative     Not on file       Family History:   Family History   Problem Relation Age of Onset     Breast cancer Maternal Aunt      Breast cancer Paternal Aunt          Levar Mo MD

## 2021-05-30 ENCOUNTER — RECORDS - HEALTHEAST (OUTPATIENT)
Dept: ADMINISTRATIVE | Facility: CLINIC | Age: 66
End: 2021-05-30

## 2021-05-30 NOTE — TELEPHONE ENCOUNTER
Controlled Substance Refill Request  Medication:   Requested Prescriptions     Pending Prescriptions Disp Refills     LORazepam (ATIVAN) 0.5 MG tablet [Pharmacy Med Name: LORAZEPAM 0.5MG TABLETS] 30 tablet 0     Sig: TAKE 1 TABLET(0.5 MG) BY MOUTH TWICE DAILY     Date Last Fill: 4/5/19  [facility]    Submit electronically to pharmacy    Controlled Substance Agreement on File:   Encounter-Level CSA Scan Date:    There are no encounter-level csa scan date.         Last office visit: 5/30/2019 Levar Mo MD

## 2021-05-31 VITALS — BODY MASS INDEX: 23.49 KG/M2 | HEIGHT: 65 IN | WEIGHT: 141 LBS

## 2021-06-02 VITALS — BODY MASS INDEX: 23.66 KG/M2 | HEIGHT: 65 IN | WEIGHT: 142 LBS

## 2021-06-03 VITALS — WEIGHT: 145 LBS | BODY MASS INDEX: 24.16 KG/M2 | HEIGHT: 65 IN

## 2021-06-03 VITALS — BODY MASS INDEX: 24.5 KG/M2 | WEIGHT: 145 LBS

## 2021-06-03 NOTE — TELEPHONE ENCOUNTER
RN cannot approve Refill Request    RN can NOT refill this medication med is not covered by policy/route to provider. Last office visit: 4/16/2019 Jordon Jay MD Last Physical: Visit date not found Last MTM visit: Visit date not found Last visit same specialty: 5/30/2019 Levar Mo MD.  Next visit within 3 mo: Visit date not found  Next physical within 3 mo: Visit date not found      Estelita Landrum, [facility] Triage/Med Refill 11/11/2019    Requested Prescriptions   Pending Prescriptions Disp Refills     clotrimazole-betamethasone (LOTRISONE) cream [Pharmacy Med Name: CLOTRIMAZOLE-BETAMETHASONE CRM 15GM] 30 g 0     Sig: APPLY SPARINGLY TO THE AFFECTED AREA ON ABDOMEN       There is no refill protocol information for this order

## 2021-06-05 VITALS
OXYGEN SATURATION: 99 % | WEIGHT: 141 LBS | SYSTOLIC BLOOD PRESSURE: 138 MMHG | DIASTOLIC BLOOD PRESSURE: 64 MMHG | TEMPERATURE: 97.8 F | BODY MASS INDEX: 23.49 KG/M2 | HEART RATE: 80 BPM | HEIGHT: 65 IN

## 2021-06-09 NOTE — PROGRESS NOTES
"Maryann Atkinson is a 65 y.o. female who is being evaluated via a billable video visit.      The patient has been notified of following:     \"This video visit will be conducted via a call between you and your physician/provider. We have found that certain health care needs can be provided without the need for an in-person physical exam.  This service lets us provide the care you need with a video conversation.  If a prescription is necessary we can send it directly to your pharmacy.  If lab work is needed we can place an order for that and you can then stop by our lab to have the test done at a later time.    Video visits are billed at different rates depending on your insurance coverage. Please reach out to your insurance provider with any questions.    If during the course of the call the physician/provider feels a video visit is not appropriate, you will not be charged for this service.\"    Patient has given verbal consent to a Video visit? Yes    Will anyone else be joining your video visit? No      Video Start Time: 2:00 PM    Additional provider notes: Abdominal pain right lower quadrant.  Negative colonoscopy 3 years prior.  Change in bowel movements pasty-like stools 5/day.  No nocturnal diarrhea tried to lose weight to fit into address for daughter's wedding and wedding was canceled slated originally for April 2020.  No blood in stool no nausea or vomiting.  No rectal pain no family history of colon cancer or ulcerative colitis inflammatory bowel disease.    Suggest probiotics plus Metamucil daily 1 heaping tablespoon and check CT scan of abdomen and pelvis.  If no better in 1 month's time repeat colonoscopy.  Patient agreed with plan.    Increased anxiety notify patient previously had used lorazepam for 30 years small doses half of a 0.5 mg daily requested refill of same okay for lorazepam 0.5 mg #31 refill 1 daily as needed.  Cautioned regarding excessive use of same as it is " habit-forming.      Video-Visit Details    Type of service:  Video Visit    Video End Time (time video stopped): 2:18 PM  Originating Location (pt. Location): Home    Distant Location (provider location):  [facility] INTER[facility]     Platform used for Video Visit: Gray Espinoza MD

## 2021-06-09 NOTE — TELEPHONE ENCOUNTER
Orders being requested:   Colonoscopy    Reason service is needed/diagnosis:   Abdominal pain.    When are orders needed by:   ASAP    Where to send Orders:   [facility]     Okay to leave detailed message?    Yes    [facility] called stating the patient is requesting a Colonoscopy do to chronic abdominal pain.    Caller not sure if patient needs to be assess by PCP first, since last exam was done 2017.    Please call patient to follow up on symptoms and advise with a plan of care.

## 2021-06-09 NOTE — TELEPHONE ENCOUNTER
Who is calling:    Patient    Reason for Call:    Patient is waiting for her message to be address and advised on plan of care.    Date of last appointment with primary care: 06/22/2020    Okay to leave a detailed message: Yes    Please call to discuss the plan of care.

## 2021-06-09 NOTE — TELEPHONE ENCOUNTER
Prior Authorization Request  Who s requesting:  Pharmacy  Pharmacy Name and Location: [facility]  Medication Name: LORazepam (ATIVAN) 0.5 MG tablet   Insurance Plan:   Insurance Member ID Number:    CoverMyMeds Key: [id number]  Informed patient that prior authorizations can take up to 10 business days for response:   No  Okay to leave a detailed message: No

## 2021-06-09 NOTE — TELEPHONE ENCOUNTER
Central PA team  [phone]  Pool: HE PA MED ([id number])          PA has been initiated.       PA form completed and faxed insurance via Cover My Meds     Key:  : [id number] - Rx #: [id number]     Medication:  LORazepam 0.5MG tablets    Insurance:  [employer] [place]        Response will be received via fax and may take up to 5-10 business days depending on plan

## 2021-06-16 NOTE — TELEPHONE ENCOUNTER
Last virtual Visit  6/22/2020 Dr. Espinoza    Notes:  Additional provider notes: Abdominal pain right lower quadrant.  Negative colonoscopy 3 years prior.  Change in bowel movements pasty-like stools 5/day.  No nocturnal diarrhea tried to lose weight to fit into address for daughter's wedding and wedding was canceled slated originally for April 2020.  No blood in stool no nausea or vomiting.  No rectal pain no family history of colon cancer or ulcerative colitis inflammatory bowel disease.     Suggest probiotics plus Metamucil daily 1 heaping tablespoon and check CT scan of abdomen and pelvis.  If no better in 1 month's time repeat colonoscopy.  Patient agreed with plan.    Last Filled:  LORazepam (ATIVAN) 0.5 MG tablet 30 tablet 1 6/22/2020  No   Sig - Route: Take 1 tablet (0.5 mg total) by mouth as needed. As needed - Oral   Sent to pharmacy as: LORazepam 0.5 mg tablet (ATIVAN)   E-Prescribing Status: Receipt confirmed by pharmacy (6/22/2020  2:16 PM CDT)         No results found for: AMPHET, HEBENZODIAZ, OPIATES, PCP, THC, BARBIT, COCAINEMETAB, METHADNE, OXYCODONE, CREAUR      Next OV:  Visit date not found      Encounter-Level CSA Scan Date:    There are no encounter-level csa scan date.           Medication teed up for provider signature - please adjust as appropriate.

## 2021-06-16 NOTE — PROGRESS NOTES
Assessment & Plan     Gastroesophageal reflux disease with esophagitis without hemorrhage  History of intermittent reflux previously controlled with Tums with known hiatal hernia.  Last EGD 2015 with results reviewed.  She has been experiencing burning sensation in her chest which began 3 weeks ago.  Symptoms worse with burping.  Started Prilosec 20 mg daily and took this for 2 weeks with complete resolution of symptoms.  Since stopping the medication 5 days ago, burning sensation is returning.  Denies any dysphagia.  She is concerned as her mother was diagnosed with esophageal cancer in her 90s.  She does not smoke nor does she use alcohol.  She will have 1 strong cup of coffee each morning and has been trying to cut back on this.  We will make arrangements for EGD given her chronic reflux history and her family history of esophageal cancer.  However, I am recommending that she begin omeprazole 20 mg daily and continue for a total of 6 weeks.  We discussed potential side effects of PPI medications with long-term use.  After completing 6 weeks of omeprazole, she can transition to OTC famotidine 20 mg daily and can continue this long-term.  - omeprazole (PRILOSEC) 20 MG capsule  Dispense: 30 capsule; Refill: 1  - Ambulatory Referral for Upper GI Endoscopy    Family history of esophageal cancer  As above, family history of esophageal cancer               Return in about 3 months (around 7/8/2021) for Annual physical.    Richar Rodas MD  [facility]    Subjective       HPI 66-year-old woman experiencing burning sensation in her chest over the last 3 weeks.  See assessment and plan for details of visit    Current Outpatient Medications on File Prior to Visit   Medication Sig Dispense Refill     clotrimazole-betamethasone (LOTRISONE) cream APPLY SPARINGLY TO THE AFFECTED AREA ON ABDOMEN 30 g 0     LORazepam (ATIVAN) 0.5 MG tablet TAKE 1 TABLET BY MOUTH EVERY DAY AS NEEDED 30  "tablet 0     OXYMETAZOLINE HCL (AFRIN, OXYMETAZOLINE, NASL) Use as directed.       zaleplon (SONATA) 10 MG capsule Take 10 mg by mouth as needed.       No current facility-administered medications on file prior to visit.         Review of Systems    12 point ROS is negative other than what is described in assessment and plan and above      Objective    Vitals:    04/08/21 1544   BP: 138/64   Patient Site: Right Arm   Patient Position: Sitting   Cuff Size: Adult Regular   Pulse: 80   Temp: 97.8  F (36.6  C)   TempSrc: Tympanic   SpO2: 99%   Weight: 141 lb (64 kg)   Height: 5' 4.5\" (1.638 m)        Physical Exam        "

## 2021-06-16 NOTE — PATIENT INSTRUCTIONS - HE
After completing 6 weeks of omeprazole 20 mg every morning, you can change to OTC Pepcid/famotidine 20 mg daily for long-term control of reflux symptoms

## 2021-06-16 NOTE — TELEPHONE ENCOUNTER
Controlled Substance Refill Request  Medication Name:   Requested Prescriptions     Pending Prescriptions Disp Refills     LORazepam (ATIVAN) 0.5 MG tablet [Pharmacy Med Name: LORAZEPAM 0.5MG TABLETS] 30 tablet 0     Sig: TAKE 1 TABLET BY MOUTH EVERY DAY AS NEEDED     Date Last Fill: 6/22/20  Requested [facility]  Submit electronically to pharmacy  Controlled Substance Agreement on file:   Encounter-Level CSA Scan Date:    There are no encounter-level csa scan date.        Last office visit:  6/22/20

## 2021-06-17 NOTE — TELEPHONE ENCOUNTER
Telephone Encounter by Vero Tyler at 6/25/2020  1:36 PM     Author: Vero Tyler Service: -- Author Type: --    Filed: 6/25/2020  1:37 PM Encounter Date: 6/25/2020 Status: Signed    : Vero Tyler APPROVED:    Approval start date: 03/27/2020  Approval end date:  06/25/2021    Pharmacy has been notified of approval and will contact patient when medication is ready for pickup.

## 2021-06-17 NOTE — TELEPHONE ENCOUNTER
Refill Request  Medication name:   Requested Prescriptions     Pending Prescriptions Disp Refills     omeprazole (PRILOSEC) 20 MG capsule 30 capsule 1     Sig: Take 1 capsule (20 mg total) by mouth daily before breakfast.     Who prescribed the medication: janae  Last refill on medication: 4/8/2021  [facility]ed [facility]  Last appointment with PCP: 4/8/2021  Next appointment: 7/13/2021    Ban Sands, CMA

## 2021-06-20 NOTE — PROGRESS NOTES
Office Visit - Follow Up   Maryann Atkinson   63 y.o. female    Date of Visit: 10/2/2018    Chief Complaint   Patient presents with     Temporomandibular Joint Pain     Having pain left side of jaw, popping at times, pain radiates up into ear, head and jaw, ongoing x > 10 years but worse since August        Assessment and Plan   1. TMJ (temporomandibular joint syndrome)  Maryann has been bothered by this off-and-on for a few years.  Is been particularly bad since August of this year.  Occasionally her jaw locks.  They have asked her to re-see her dentist.  She certainly could use a mouth guard at night.  Physical therapy to her jaw muscles also would be of benefit.  If these modalities fail we could have her a TMJ specialist.          No Follow-up on file.     History of Present Illness   This 63 y.o. old comes in complaining that her left jaw is painful.  He has had the symptoms for years.  She is discussed that with her dentist.  She grinds her teeth at night.  He recommended a mouthguard and some physical therapy.  She did not follow through on these issues.  Now she reports that since August of this year her pain is worse and her jaw is locking more frequently.  She tries not to open her jaw much at all to prevent locking.  She is trying to restrict her diet to softer foods.  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  She thinks it made it worse.  Her dentist in the past is recommended to have a mouth guard and specific physical therapy for her jaw she did not follow through on this.  She denies any swelling in her jaw area.  No fevers or chills.    Review of Systems: A comprehensive review of systems was negative except as noted.     Medications, Allergies and Problem List   Reviewed, reconciled and updated     Physical Exam   General Appearance:       /78 (Patient Site: Left Arm, Patient Position: Sitting, Cuff Size: Adult Regular)  Pulse 70  Ht 5'  "4.5\" (1.638 m)  Wt 142 lb (64.4 kg)  SpO2 99%  BMI 24 kg/m2    No localized tenderness in either jaw area.  There is no swelling of her parotid glands.  No adenopathy in the neck.  Age of motion of her jaw was not tested as the patient was worried that she did have pain and it could lock on her.     Additional Information   Current Outpatient Prescriptions   Medication Sig Dispense Refill     albuterol (PROAIR HFA;PROVENTIL HFA;VENTOLIN HFA) 90 mcg/actuation inhaler Inhale 2 puffs as needed.       clotrimazole-betamethasone (LOTRISONE) cream Sparingly to affected area on abdomen 30 g 0     LORazepam (ATIVAN) 0.5 MG tablet TAKE 1 TABLET(0.5 MG) BY MOUTH TWICE DAILY 30 tablet 0     OXYMETAZOLINE HCL (AFRIN, OXYMETAZOLINE, NASL) Use as directed.       ranitidine (ZANTAC) 150 MG tablet TAKE ONE TABLET (150MG TOTAL) BY MOUTH AT BEDTIME 90 tablet 3     zaleplon (SONATA) 10 MG capsule Take 10 mg by mouth as needed.       No current facility-administered medications for this visit.      Allergies   Allergen Reactions     Azithromycin Itching     Social History   Substance Use Topics     Smoking status: Never Smoker     Smokeless tobacco: Never Used     Alcohol use None       Review and/or order of clinical lab tests:  Review and/or order of radiology tests:  Review and/or order of medicine tests:  Discussion of test results with performing physician:  Decision to obtain old records and/or obtain history from someone other than the patient:  Review and summarization of old records and/or obtaining history from someone other than the patient and.or discussion of case with another health care provider:  Independent visualization of image, tracing or specimen itself:    Time: total time spent with the patient was 15 minutes of which >50% was spent in counseling and coordination of care     Jordon Jay MD    "

## 2021-06-24 NOTE — TELEPHONE ENCOUNTER
Has uti? Never had one before.  Needs to be seen.    Has urgency, painful to urinate, frequency and feels like she does not empty.  Yesterday a little blood in it.    Wants to be seen today at [facility] by her home.    Transferred her call to [facility] central scheduling.    Ana Steinberg, RN, [facility] Nurse Triage/Med Refills RN     Reason for Disposition    Age > 50 years    Protocols used: URINATION PAIN - FEMALE-A-OH

## 2021-07-13 ENCOUNTER — OFFICE VISIT (OUTPATIENT)
Dept: INTERNAL MEDICINE | Facility: CLINIC | Age: 66
End: 2021-07-13
Payer: COMMERCIAL

## 2021-07-13 VITALS
WEIGHT: 139 LBS | TEMPERATURE: 97 F | HEIGHT: 66 IN | DIASTOLIC BLOOD PRESSURE: 78 MMHG | SYSTOLIC BLOOD PRESSURE: 118 MMHG | OXYGEN SATURATION: 99 % | BODY MASS INDEX: 22.34 KG/M2 | HEART RATE: 76 BPM

## 2021-07-13 DIAGNOSIS — F41.1 GAD (GENERALIZED ANXIETY DISORDER): ICD-10-CM

## 2021-07-13 DIAGNOSIS — Z11.59 ENCOUNTER FOR HEPATITIS C SCREENING TEST FOR LOW RISK PATIENT: ICD-10-CM

## 2021-07-13 DIAGNOSIS — Z78.0 POSTMENOPAUSAL STATUS: ICD-10-CM

## 2021-07-13 DIAGNOSIS — Z13.220 LIPID SCREENING: ICD-10-CM

## 2021-07-13 DIAGNOSIS — K21.9 GERD WITHOUT ESOPHAGITIS: ICD-10-CM

## 2021-07-13 DIAGNOSIS — Z00.00 MEDICARE ANNUAL WELLNESS VISIT, SUBSEQUENT: Primary | ICD-10-CM

## 2021-07-13 DIAGNOSIS — Z80.0 FAMILY HISTORY OF ESOPHAGEAL CANCER: ICD-10-CM

## 2021-07-13 DIAGNOSIS — Z00.00 HEALTHCARE MAINTENANCE: ICD-10-CM

## 2021-07-13 DIAGNOSIS — R00.2 PALPITATIONS: ICD-10-CM

## 2021-07-13 PROBLEM — M26.609 TMJ (TEMPOROMANDIBULAR JOINT SYNDROME): Status: ACTIVE | Noted: 2018-10-03

## 2021-07-13 PROBLEM — M26.609 TMJ (TEMPOROMANDIBULAR JOINT SYNDROME): Status: RESOLVED | Noted: 2018-10-03 | Resolved: 2021-07-13

## 2021-07-13 LAB
ALBUMIN SERPL-MCNC: 4.1 G/DL (ref 3.5–5)
ALP SERPL-CCNC: 99 U/L (ref 45–120)
ALT SERPL W P-5'-P-CCNC: 28 U/L (ref 0–45)
ANION GAP SERPL CALCULATED.3IONS-SCNC: 13 MMOL/L (ref 5–18)
AST SERPL W P-5'-P-CCNC: 20 U/L (ref 0–40)
BILIRUB SERPL-MCNC: 0.7 MG/DL (ref 0–1)
BUN SERPL-MCNC: 11 MG/DL (ref 8–22)
CALCIUM SERPL-MCNC: 9.5 MG/DL (ref 8.5–10.5)
CHLORIDE BLD-SCNC: 101 MMOL/L (ref 98–107)
CHOLEST SERPL-MCNC: 228 MG/DL
CO2 SERPL-SCNC: 25 MMOL/L (ref 22–31)
CREAT SERPL-MCNC: 0.69 MG/DL (ref 0.6–1.1)
ERYTHROCYTE [DISTWIDTH] IN BLOOD BY AUTOMATED COUNT: 11.7 % (ref 10–15)
FASTING STATUS PATIENT QL REPORTED: YES
GFR SERPL CREATININE-BSD FRML MDRD: 90 ML/MIN/1.73M2
GLUCOSE BLD-MCNC: 99 MG/DL (ref 70–125)
HCT VFR BLD AUTO: 39.7 % (ref 35–47)
HDLC SERPL-MCNC: 97 MG/DL
HGB BLD-MCNC: 13.5 G/DL (ref 11.7–15.7)
LDLC SERPL CALC-MCNC: 118 MG/DL
MCH RBC QN AUTO: 30.8 PG (ref 26.5–33)
MCHC RBC AUTO-ENTMCNC: 34 G/DL (ref 31.5–36.5)
MCV RBC AUTO: 90 FL (ref 78–100)
PLATELET # BLD AUTO: 262 10E3/UL (ref 150–450)
POTASSIUM BLD-SCNC: 3.9 MMOL/L (ref 3.5–5)
PROT SERPL-MCNC: 6.4 G/DL (ref 6–8)
RBC # BLD AUTO: 4.39 10E6/UL (ref 3.8–5.2)
SODIUM SERPL-SCNC: 139 MMOL/L (ref 136–145)
TRIGL SERPL-MCNC: 64 MG/DL
TSH SERPL DL<=0.005 MIU/L-ACNC: 1.04 UIU/ML (ref 0.3–5)
WBC # BLD AUTO: 4.7 10E3/UL (ref 4–11)

## 2021-07-13 PROCEDURE — 90732 PPSV23 VACC 2 YRS+ SUBQ/IM: CPT | Performed by: INTERNAL MEDICINE

## 2021-07-13 PROCEDURE — 80053 COMPREHEN METABOLIC PANEL: CPT | Performed by: INTERNAL MEDICINE

## 2021-07-13 PROCEDURE — G0438 PPPS, INITIAL VISIT: HCPCS | Performed by: INTERNAL MEDICINE

## 2021-07-13 PROCEDURE — 80061 LIPID PANEL: CPT | Performed by: INTERNAL MEDICINE

## 2021-07-13 PROCEDURE — 36415 COLL VENOUS BLD VENIPUNCTURE: CPT | Performed by: INTERNAL MEDICINE

## 2021-07-13 PROCEDURE — 99214 OFFICE O/P EST MOD 30 MIN: CPT | Mod: 25 | Performed by: INTERNAL MEDICINE

## 2021-07-13 PROCEDURE — G0009 ADMIN PNEUMOCOCCAL VACCINE: HCPCS | Performed by: INTERNAL MEDICINE

## 2021-07-13 PROCEDURE — 85027 COMPLETE CBC AUTOMATED: CPT | Performed by: INTERNAL MEDICINE

## 2021-07-13 PROCEDURE — 84443 ASSAY THYROID STIM HORMONE: CPT | Performed by: INTERNAL MEDICINE

## 2021-07-13 PROCEDURE — 86803 HEPATITIS C AB TEST: CPT | Performed by: INTERNAL MEDICINE

## 2021-07-13 RX ORDER — FAMOTIDINE 10 MG
10 TABLET ORAL 2 TIMES DAILY
COMMUNITY
Start: 2021-07-13 | End: 2024-03-12

## 2021-07-13 RX ORDER — LORAZEPAM 0.5 MG/1
0.5 TABLET ORAL EVERY 8 HOURS PRN
Qty: 30 TABLET | Refills: 0 | Status: SHIPPED
Start: 2021-07-13 | End: 2021-11-23

## 2021-07-13 ASSESSMENT — MIFFLIN-ST. JEOR: SCORE: 1169.31

## 2021-07-13 ASSESSMENT — ACTIVITIES OF DAILY LIVING (ADL): CURRENT_FUNCTION: NO ASSISTANCE NEEDED

## 2021-07-13 NOTE — PROGRESS NOTES
"SUBJECTIVE:   Maryann Atkinson is a 66 year old female who presents for Preventive Visit.    In addition to her annual wellness visit, patient is here to discuss other medical concerns including GERD, anxiety, and palpitations.  See assessment and plan below for details.          Patient has been advised of split billing requirements and indicates understanding: Yes   Are you in the first 12 months of your Medicare coverage?  No    Healthy Habits:     In general, how would you rate your overall health?  Excellent    Frequency of exercise:  6-7 days/week    Duration of exercise:  Greater than 60 minutes    Do you usually eat at least 4 servings of fruit and vegetables a day, include whole grains    & fiber and avoid regularly eating high fat or \"junk\" foods?  Yes    Taking medications regularly:  Not Applicable    Medication side effects:  Not applicable    Ability to successfully perform activities of daily living:  No assistance needed    Home Safety:  No safety concerns identified    Hearing Impairment:  No hearing concerns    In the past 6 months, have you been bothered by leaking of urine?  No    In general, how would you rate your overall mental or emotional health?  Excellent      PHQ-2 Total Score: 0    Additional concerns today:  No    Do you feel safe in your environment? YES    Have you ever done Advance Care Planning? (For example, a Health Directive, POLST, or a discussion with a medical provider or your loved ones about your wishes): Yes, advance care planning is on file.       Fall risk -Done, no falls       Cognitive Screening completed with normal clock drawing and word recall    Do you have sleep apnea, excessive snoring or daytime drowsiness?: no    Reviewed and updated as needed this visit by clinical staff  Tobacco  Allergies  Meds              Reviewed and updated as needed this visit by Provider                Social History     Tobacco Use     Smoking status: Never Smoker     Smokeless " "tobacco: Never Used   Substance Use Topics     Alcohol use: Not Currently     Comment: Alcoholic Drinks/day: rare     If you drink alcohol do you typically have >3 drinks per day or >7 drinks per week? No    Alcohol Use 7/13/2021   Prescreen: >3 drinks/day or >7 drinks/week? No                  Patient Care Team:  Richar Rodas MD as PCP - General    The following health maintenance items are reviewed in Epic and correct as of today:  Health Maintenance Due   Topic Date Due     DEXA  Never done     ANNUAL REVIEW OF HM ORDERS  Never done     ADVANCE CARE PLANNING  Never done     HEPATITIS C SCREENING  Never done     ZOSTER IMMUNIZATION (1 of 2) Never done     MEDICARE ANNUAL WELLNESS VISIT  Never done     Pneumococcal Vaccine: 65+ Years (2 of 2 - PPSV23) 01/29/2020     FALL RISK ASSESSMENT  06/22/2021           Review of Systems  12 point review of systems is negative other than what is described in assessment and plan    OBJECTIVE:   /78 (BP Location: Right arm, Patient Position: Sitting, Cuff Size: Adult Regular)   Pulse 76   Temp 97  F (36.1  C) (Temporal)   Ht 1.664 m (5' 5.5\")   Wt 63 kg (139 lb)   SpO2 99%   BMI 22.78 kg/m   Estimated body mass index is 22.78 kg/m  as calculated from the following:    Height as of this encounter: 1.664 m (5' 5.5\").    Weight as of this encounter: 63 kg (139 lb).  Physical Exam  EYES: Eyelids, conjunctiva, and sclera were normal. Pupils were normal. Cornea, iris, and lens were normal bilaterally.  HEAD, EARS, NOSE, MOUTH, AND THROAT: Head and face were normal. TMs and external auditory canals are normal  NECK: Neck appearance was normal. There were no neck masses and the thyroid was not enlarged and no nodules are felt.  No lymphadenopathy.  RESPIRATORY: Breathing pattern was normal and the chest moved symmetrically.   Lung sounds were normal and there were no rales or wheezes.  CARDIOVASCULAR: Heart rate and rhythm were normal.  S1 and S2 were normal and " there were no extra sounds or murmurs. Peripheral pulses in arms and legs were normal.  Jugular venous pressure was normal.  There was no peripheral edema.  No carotid bruits.  GASTROINTESTINAL:  Bowel sounds were present.   Palpation detected no tenderness, mass, or enlarged organs.   MUSCULOSKELETAL: Skeletal configuration was normal and muscle mass was normal for age. Joint appearance was overall normal.  LYMPHATIC: There were no enlarged nodes.  SKIN/HAIR/NAILS: Skin color was normal.  Hair and nails were normal.There were no skin lesions.  NEUROLOGIC: The patient was alert and oriented to person, place, time, and circumstance. Speech was normal. Cranial nerves were normal. Motor strength was normal for age. The patient was normally coordinated.  Sensation intact.  PSYCHIATRIC:  Mood and affect were normal and the patient had normal recent and remote memory. The patient's judgment and insight were normal.        ASSESSMENT / PLAN:   1. Medicare annual wellness visit, subsequent  Immunizations are reviewed and providing Pneumovax 23.  Also recommending annual flu shot and getting Shingrix completed.  She has a living will.  Non-smoker.  Uses alcohol in moderation.  Exercising on a regular basis.  Up to date with colonoscopies and this should be repeated in 2030.  She will continue to get a mammogram annually.  Complete hysterectomy.  Last DEXA was over 5 years ago and this should be repeated this year. Dementia and depression screening completed.  She sees an ophthalmologist every year. Skin exam performed and recommending regular use of sunblock.  Recommending dermatology appointment every 1 to 2 years for total-body skin exam.  Hepatitis C antibody for screening.  Will screen for diabetes with fasting glucose.  Checking fasting lipid profile.     2. SHAMA (generalized anxiety disorder)  Intermittent anxiety using lorazepam infrequently.  She understands risk of dependence and is using cautiously.  - LORazepam  "(ATIVAN) 0.5 MG tablet; Take 1 tablet (0.5 mg) by mouth every 8 hours as needed for anxiety  Dispense: 30 tablet; Refill: 0    3. GERD without esophagitis  Completed EGD which was normal without esophagitis or stricture.  She took a PPI for 6 weeks and is currently on famotidine 10 mg twice daily with good control of reflux symptoms with breakthrough on only one occasion.  We discussed lifestyle modification but she is already restricting caffeine intake and her BMI is normal.  - famotidine (PEPCID) 10 MG tablet; Take 1 tablet (10 mg) by mouth 2 times daily    4. Family history of esophageal cancer  EGD as above    5. Palpitations  Brief episode of feeling lightheaded associated with palpitations.  More a monitor several years ago which was normal.  - TSH; Future    6. Postmenopausal status    - DX Hip/Pelvis/Spine; Future    7. Healthcare maintenance    - CBC with platelets; Future  - Comprehensive metabolic panel (BMP + Alb, Alk Phos, ALT, AST, Total. Bili, TP); Future  - UA Macro with Reflex to Micro and Culture - lab collect; Future    8. Lipid screening    - Lipid Profile (Chol, Trig, HDL, LDL calc); Future    9. Encounter for hepatitis C screening test for low risk patient    - Hepatitis C antibody; Future    Patient has been advised of split billing requirements and indicates understanding: Yes  COUNSELING:  Reviewed preventive health counseling, as reflected in patient instructions       Regular exercise       Healthy diet/nutrition       Vision screening       Osteoporosis prevention/bone health       Hepatitis C screening    Estimated body mass index is 22.78 kg/m  as calculated from the following:    Height as of this encounter: 1.664 m (5' 5.5\").    Weight as of this encounter: 63 kg (139 lb).        She reports that she has never smoked. She has never used smokeless tobacco.      Appropriate preventive services were discussed with this patient, including applicable screening as appropriate for " cardiovascular disease, diabetes, osteopenia/osteoporosis, and glaucoma.  As appropriate for age/gender, discussed screening for colorectal cancer, prostate cancer, breast cancer, and cervical cancer. Checklist reviewing preventive services available has been given to the patient.    Reviewed patients plan of care and provided an AVS. The Basic Care Plan (routine screening as documented in Health Maintenance) for Maryann meets the Care Plan requirement. This Care Plan has been established and reviewed with the Patient.    Counseling Resources:  ATP IV Guidelines  Pooled Cohorts Equation Calculator  Breast Cancer Risk Calculator  Breast Cancer: Medication to Reduce Risk  FRAX Risk Assessment  ICSI Preventive Guidelines  Dietary Guidelines for Americans, 2010  USDA's MyPlate  ASA Prophylaxis  Lung CA Screening    Richar Rodas MD  [facility]

## 2021-07-13 NOTE — PATIENT INSTRUCTIONS
Patient Education   Personalized Prevention Plan  You are due for the preventive services outlined below.  Your care team is available to assist you in scheduling these services.  If you have already completed any of these items, please share that information with your care team to update in your medical record.  Health Maintenance Due   Topic Date Due     Osteoporosis Screening  Never done     ANNUAL REVIEW OF HM ORDERS  Never done     Hepatitis C Screening  Never done     Zoster (Shingles) Vaccine (1 of 2) Never done     Pneumococcal Vaccine (2 of 2 - PPSV23) 01/29/2020      Annual flu shot every fall    Recommending shingles vaccine, Shingrix.  This can be obtained at your pharmacy.    Continue to get a mammogram every year    Ordering DEXA for osteoporosis screening    Colonoscopy every 10 years    Continue to see her eye doctor every year    Consider seeing a dermatologist every 1 to 2 years for total-body skin exam

## 2021-07-14 LAB — HCV AB SERPL QL IA: NEGATIVE

## 2021-09-19 ENCOUNTER — HEALTH MAINTENANCE LETTER (OUTPATIENT)
Age: 66
End: 2021-09-19

## 2021-10-28 ENCOUNTER — TELEPHONE (OUTPATIENT)
Dept: CARDIOLOGY | Facility: CLINIC | Age: 66
End: 2021-10-28

## 2021-10-28 ENCOUNTER — PATIENT MESSAGE (OUTPATIENT)
Dept: CARDIOLOGY | Facility: CLINIC | Age: 66
End: 2021-10-28

## 2021-10-28 NOTE — TELEPHONE ENCOUNTER
----- Message from Kiesha Dempsey sent at 10/28/2021  3:15 PM EDT -----  Regarding: A Fib incidents  I have had a couple incidents that I think are A Fib related.   One most recently on 10/28/21.  Symptoms:   Severely lightheaded, nausea, fading heart beat.   Very fatigued. Fatique especially as it started to subside; felt like I had run a marathon.   (Awoke with a very painful headache, I took Excedrin about 1-2 hours prior to onset of symptoms).  Duration: 4 hr hours.   What I did for it:   Ate a banana  Drink cold water  Took an Eliquis tablet per MATILDE Rodriguez's recommendation during last visit in January 2021. (This seemed to be most effective)  Previous incident probably 3-4 months ago though this one was more intense. Having more frequent heart palpitations/irregular heart beats last few months with lightheadedness.     I have an appointment  scheduled for Jan. 5,2022.     Should I wait for the appointment or be seen sooner?  I am still taking the low dose aspirin daily, as recommended.   Kiesha Dempsey

## 2021-10-29 DIAGNOSIS — I48.0 PAF (PAROXYSMAL ATRIAL FIBRILLATION) (HCC): Primary | ICD-10-CM

## 2021-12-09 ENCOUNTER — TELEPHONE (OUTPATIENT)
Dept: CARDIOLOGY | Facility: CLINIC | Age: 66
End: 2021-12-09

## 2021-12-09 NOTE — TELEPHONE ENCOUNTER
Called patient to let her know that her monitor did not show any AFIB, just PACs occasionally and PSAT. She will follow up with Dr. Rodriguez on 1/5/2022 as scheduled. Questions answered. She expresses understanding.

## 2022-01-05 ENCOUNTER — OFFICE VISIT (OUTPATIENT)
Dept: CARDIOLOGY | Facility: CLINIC | Age: 67
End: 2022-01-05

## 2022-01-05 VITALS
SYSTOLIC BLOOD PRESSURE: 128 MMHG | HEART RATE: 75 BPM | HEIGHT: 64 IN | BODY MASS INDEX: 20.69 KG/M2 | DIASTOLIC BLOOD PRESSURE: 78 MMHG | OXYGEN SATURATION: 94 % | WEIGHT: 121.2 LBS

## 2022-01-05 DIAGNOSIS — I49.1 PREMATURE ATRIAL CONTRACTIONS: ICD-10-CM

## 2022-01-05 DIAGNOSIS — I48.0 PAROXYSMAL ATRIAL FIBRILLATION: Primary | ICD-10-CM

## 2022-01-05 PROCEDURE — 99213 OFFICE O/P EST LOW 20 MIN: CPT | Performed by: INTERNAL MEDICINE

## 2022-01-05 NOTE — PROGRESS NOTES
Kiesha PADRON Ke  1955  [phone]    01/05/2022    [facility] CARDIOLOGY     Referring Provider: No ref. provider found     Tyler Rucker MD  [address] [place] [address]    Chief Complaint   Patient presents with   • Paroxysmal atrial fibrillation       Problem List:     1. Paroxysmal Atrial Fibrillation  a. CHADSVasc = 2 on Eliquis  b. 24 Hour Holter 3/2018: no atrial fibrillation.   c. Diagnosed at PCP by EKG with symptoms of palpitations 11/15/18  d. Treated with metoprolol - caused low BP and low HR  e. Flecainide started 12/2018 - intolerant due to HA  f. Echocardiogram 2/15/18: EF 60%, mild TR  g. Stress Test 4/9/18: EF 69%, no ischemia. Low risk study.   h. Admit to [facility] ER for Atypical chest pain, Negative markers. Normal EKG Sinus Bradycardia.  i. 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 Echocardiogram 2/12/2020: EF 58%, no significant structural or functional valvular disease  k. Stress Echocardiogram 2/24/2021: EF 58%, no EKG evidence of ischemia, Normal stress echo with no significant echocardiographic evidence for myocardial ischemia.  l. Event Monitor 11/3-12/4/2021: No atrial fibrillation, occasional PACs and paroxysmal atrial tachycardia  2. Frequent UTIs  3. Migraines  4. Colon polyps  5. Chronic nausea  6. Surgical History  a. Hyterectomy  b. Total hip arthroplasty  c. Vein surgery      Allergies  Allergies   Allergen Reactions   • Bactrim [Sulfamethoxazole-Trimethoprim] Hives   • Tramadol Shortness Of Breath, Nausea Only and Other (See Comments)     CHEST PAIN   • Flecainide Other (See Comments)     Headaches         Current Medications    Current Outpatient Medications:   •  aspirin 81 MG EC tablet, Take 81 mg by mouth Daily., Disp: , Rfl:   •  Estriol 10 % cream, Insert 1 mL into the vagina Daily As Needed., Disp: , Rfl: 5  •  Probiotic Product (PROBIOTIC DAILY PO), Take  by mouth 3 (Three) Times a Week., Disp: , Rfl:   •  ZOLMitriptan (ZOMIG) 5 MG tablet, Take 5  "mg by mouth 1 (One) Time As Needed for Migraine., Disp: , Rfl:     History of Present Illness     Pt presents for follow up of AF, PACs. Since we last saw the pt, she has overall been doing well. This past summer, she had some dizziness probably related to dehydration. She did not pass out. She had palpitations more often at that time. She called our office and monitor was placed which did not show AFIB, just occasional PACs, PVCs. She did have COVID during the monitor. She states she really only has palpitations after she has a migraine and takes her Zomig. She denies SOB, CP, LH, and dizziness. Denies any hospitalizations, ER visits, bleeding, or TIA/CVA symptoms. Overall feels well.    ROS:  General:  Denies fatigue, weight gain or loss  Cardiovascular:  Denies CP, PND, syncope, near syncope, edema or + palpitations.  Pulmonary:  Denies LEON, cough, or wheezing      Vitals:    01/05/22 1401   BP: 128/78   BP Location: Left arm   Patient Position: Sitting   Pulse: 75   SpO2: 94%   Weight: 55 kg (121 lb 3.2 oz)   Height: 162.6 cm (64\")     Body mass index is 20.8 kg/m².  PE:  General: NAD  Neck: no JVD, no carotid bruits, no TM  Heart RRR, NL S1, S2, S4 present, no rubs, murmurs  Lungs: CTA, no wheezes, rhonchi, or rales  Abd: soft, non-tender, NL BS  Ext: No musculoskeletal deformities, no edema, cyanosis, or clubbing  Psych: normal mood and affect    Diagnostic Data:      Procedures    1. Paroxysmal atrial fibrillation (HCC)    2. Premature atrial contractions          Plan:  1. PAF:  - s/p cryo ablation 10/2019  - no recurrence off AAD on recent event monitor  - CHADSvasc = 1 (age) on ASA       2. PACs/PVCs:   -rarely seen on monitor  - monitor for now, overall not limiting to daily activity    F/up in 12 months    Electronically signed by REINA Guillermo, 01/05/22, 2:16 PM EST.    "

## 2022-02-16 ENCOUNTER — TELEPHONE (OUTPATIENT)
Dept: INTERNAL MEDICINE | Facility: CLINIC | Age: 67
End: 2022-02-16
Payer: COMMERCIAL

## 2022-02-17 ENCOUNTER — TELEPHONE (OUTPATIENT)
Dept: INTERNAL MEDICINE | Facility: CLINIC | Age: 67
End: 2022-02-17
Payer: COMMERCIAL

## 2022-02-17 NOTE — TELEPHONE ENCOUNTER
Prior authorization is fine.  However, her insurance may not authorize as she is over 65 years old.  I would call patient and suggest that she simply pay for this medication out-of-pocket since she takes such a small amount unless the cost is exorbitant

## 2022-02-17 NOTE — TELEPHONE ENCOUNTER
Please start PA for Lorazepam 0.5mg 1 tablet daily prn.  [facility] on [address] and [address] per Dr Rodas

## 2022-02-17 NOTE — TELEPHONE ENCOUNTER
LMOM that we are starting a PA and if she needs them soon, she should probably just pay for them out of pocket.   PA routed to [facility].PA med

## 2022-04-13 ASSESSMENT — ANXIETY QUESTIONNAIRES
6. BECOMING EASILY ANNOYED OR IRRITABLE: NOT AT ALL
2. NOT BEING ABLE TO STOP OR CONTROL WORRYING: NOT AT ALL
7. FEELING AFRAID AS IF SOMETHING AWFUL MIGHT HAPPEN: NOT AT ALL
GAD7 TOTAL SCORE: 7
5. BEING SO RESTLESS THAT IT IS HARD TO SIT STILL: NOT AT ALL
7. FEELING AFRAID AS IF SOMETHING AWFUL MIGHT HAPPEN: NOT AT ALL
4. TROUBLE RELAXING: NEARLY EVERY DAY
GAD7 TOTAL SCORE: 7
GAD7 TOTAL SCORE: 7
1. FEELING NERVOUS, ANXIOUS, OR ON EDGE: MORE THAN HALF THE DAYS
3. WORRYING TOO MUCH ABOUT DIFFERENT THINGS: MORE THAN HALF THE DAYS

## 2022-04-14 ASSESSMENT — ANXIETY QUESTIONNAIRES: GAD7 TOTAL SCORE: 7

## 2022-04-15 ENCOUNTER — OFFICE VISIT (OUTPATIENT)
Dept: INTERNAL MEDICINE | Facility: CLINIC | Age: 67
End: 2022-04-15
Payer: COMMERCIAL

## 2022-04-15 VITALS
SYSTOLIC BLOOD PRESSURE: 139 MMHG | DIASTOLIC BLOOD PRESSURE: 83 MMHG | BODY MASS INDEX: 23.91 KG/M2 | OXYGEN SATURATION: 98 % | WEIGHT: 145.9 LBS | HEART RATE: 84 BPM

## 2022-04-15 DIAGNOSIS — R23.2 HOT FLASHES: Primary | ICD-10-CM

## 2022-04-15 DIAGNOSIS — Z00.00 HEALTHCARE MAINTENANCE: ICD-10-CM

## 2022-04-15 DIAGNOSIS — R68.89 HEAT INTOLERANCE: ICD-10-CM

## 2022-04-15 DIAGNOSIS — F41.1 GAD (GENERALIZED ANXIETY DISORDER): ICD-10-CM

## 2022-04-15 LAB
ALBUMIN SERPL-MCNC: 4 G/DL (ref 3.5–5)
ALBUMIN UR-MCNC: NEGATIVE MG/DL
ALP SERPL-CCNC: 86 U/L (ref 45–120)
ALT SERPL W P-5'-P-CCNC: 28 U/L (ref 0–45)
ANION GAP SERPL CALCULATED.3IONS-SCNC: 11 MMOL/L (ref 5–18)
APPEARANCE UR: CLEAR
AST SERPL W P-5'-P-CCNC: 21 U/L (ref 0–40)
BILIRUB SERPL-MCNC: 0.6 MG/DL (ref 0–1)
BILIRUB UR QL STRIP: NEGATIVE
BUN SERPL-MCNC: 20 MG/DL (ref 8–22)
CALCIUM SERPL-MCNC: 9.6 MG/DL (ref 8.5–10.5)
CHLORIDE BLD-SCNC: 103 MMOL/L (ref 98–107)
CO2 SERPL-SCNC: 26 MMOL/L (ref 22–31)
COLOR UR AUTO: YELLOW
CREAT SERPL-MCNC: 0.7 MG/DL (ref 0.6–1.1)
ERYTHROCYTE [DISTWIDTH] IN BLOOD BY AUTOMATED COUNT: 11.7 % (ref 10–15)
GFR SERPL CREATININE-BSD FRML MDRD: >90 ML/MIN/1.73M2
GLUCOSE BLD-MCNC: 106 MG/DL (ref 70–125)
GLUCOSE UR STRIP-MCNC: NEGATIVE MG/DL
HCT VFR BLD AUTO: 41.1 % (ref 35–47)
HGB BLD-MCNC: 13.9 G/DL (ref 11.7–15.7)
HGB UR QL STRIP: NEGATIVE
KETONES UR STRIP-MCNC: NEGATIVE MG/DL
LEUKOCYTE ESTERASE UR QL STRIP: NEGATIVE
MCH RBC QN AUTO: 30.8 PG (ref 26.5–33)
MCHC RBC AUTO-ENTMCNC: 33.8 G/DL (ref 31.5–36.5)
MCV RBC AUTO: 91 FL (ref 78–100)
NITRATE UR QL: NEGATIVE
PH UR STRIP: 5 [PH] (ref 5–8)
PLATELET # BLD AUTO: 293 10E3/UL (ref 150–450)
POTASSIUM BLD-SCNC: 4.2 MMOL/L (ref 3.5–5)
PROT SERPL-MCNC: 7 G/DL (ref 6–8)
RBC # BLD AUTO: 4.52 10E6/UL (ref 3.8–5.2)
SODIUM SERPL-SCNC: 140 MMOL/L (ref 136–145)
SP GR UR STRIP: 1.02 (ref 1–1.03)
TSH SERPL DL<=0.005 MIU/L-ACNC: 1.79 UIU/ML (ref 0.3–5)
UROBILINOGEN UR STRIP-ACNC: 0.2 E.U./DL
WBC # BLD AUTO: 5.1 10E3/UL (ref 4–11)

## 2022-04-15 PROCEDURE — 84443 ASSAY THYROID STIM HORMONE: CPT | Performed by: INTERNAL MEDICINE

## 2022-04-15 PROCEDURE — 80053 COMPREHEN METABOLIC PANEL: CPT | Performed by: INTERNAL MEDICINE

## 2022-04-15 PROCEDURE — 36415 COLL VENOUS BLD VENIPUNCTURE: CPT | Performed by: INTERNAL MEDICINE

## 2022-04-15 PROCEDURE — 99214 OFFICE O/P EST MOD 30 MIN: CPT | Performed by: INTERNAL MEDICINE

## 2022-04-15 PROCEDURE — 96127 BRIEF EMOTIONAL/BEHAV ASSMT: CPT | Performed by: INTERNAL MEDICINE

## 2022-04-15 PROCEDURE — 85027 COMPLETE CBC AUTOMATED: CPT | Performed by: INTERNAL MEDICINE

## 2022-04-15 PROCEDURE — 81003 URINALYSIS AUTO W/O SCOPE: CPT | Performed by: INTERNAL MEDICINE

## 2022-04-15 RX ORDER — LORAZEPAM 0.5 MG/1
TABLET ORAL
Qty: 30 TABLET | Refills: 0 | Status: SHIPPED | OUTPATIENT
Start: 2022-04-15 | End: 2023-02-14

## 2022-04-15 RX ORDER — VENLAFAXINE HYDROCHLORIDE 37.5 MG/1
TABLET, EXTENDED RELEASE ORAL
Qty: 60 TABLET | Refills: 11 | Status: SHIPPED | OUTPATIENT
Start: 2022-04-15 | End: 2022-08-18 | Stop reason: SINTOL

## 2022-04-15 NOTE — PROGRESS NOTES
Assessment & Plan     Hot flashes/heat intolerance  67-year-old woman who has had hot flashes in the past but not overly bothersome the past decade.  Now becoming more bothersome.  In addition to feeling warm and sweaty all over, there is associated weakness and she will feel somewhat shaky.  She has been under significant stress the past year and does have some underlying anxiety problems..  See below. For further thoughts.  Her exam today is unremarkable.  No lymphadenopathy.  Checking appropriate labs including excluding overactive thyroid.  - CBC with platelets  - TSH  - Comprehensive metabolic panel (BMP + Alb, Alk Phos, ALT, AST, Total. Bili, TP)  - CBC with platelets  - TSH  - Comprehensive metabolic panel (BMP + Alb, Alk Phos, ALT, AST, Total. Bili, TP)    SHAMA (generalized anxiety disorder)  SHAMA-7 score is 7.  Her  with severe injuries after falling off a ladder including torn aorta.  She also has her daughter and family living with her.  Life has been stressful.  She has found her self using more of her lorazepam which does help some of the above symptoms.  Questioning whether anxiety is not playing a significant role in her presenting symptoms.  We discussed that the venlafaxine can often be helpful in managing symptoms of hot flashes.  It could also play a role in managing underlying anxiety and I would recommend starting 37.5 mg daily with plans to titrate up to 75 mg daily after 1 week.  I will refill her lorazepam which she can use as needed but we discussed using with caution as she could develop tolerance and can become dependent.  Medication also carries potential side effects in her age group.  She understands that this is not a good long-term strategy.  - LORazepam (ATIVAN) 0.5 MG tablet  Dispense: 30 tablet; Refill: 0  - venlafaxine (EFFEXOR-ER) 37.5 MG 24 hr tablet  Dispense: 60 tablet; Refill: 11        Healthcare maintenance     -  Macro with Reflex to Micro and Culture - lab  collect              Return in about 4 months (around 8/15/2022) for Annual wellness visit.    Richar Rodas MD  [facility]      30 minutes spent on the date of the encounter doing chart review, history and exam, documentation and further activities per the note    Subjective       HPI 67-year-old woman not feeling well experiencing increasing hot flashes and heat intolerance and worsening anxiety.  See assessment and plan for details of visit    Current Outpatient Medications   Medication     famotidine (PEPCID) 10 MG tablet     LORazepam (ATIVAN) 0.5 MG tablet     venlafaxine (EFFEXOR-ER) 37.5 MG 24 hr tablet     No current facility-administered medications for this visit.        Review of Systems    12 point ROS is negative other than what is described in assessment and plan and above      Objective    Vitals:    04/15/22 0811   BP: 139/83   Pulse: 84   SpO2: 98%   Weight: 66.2 kg (145 lb 14.4 oz)        Physical Exam  Pleasant but mildly anxious appearing middle-aged woman  HEENT normal  Neck without cervical lymphadenopathy or thyromegaly  Lungs clear bilaterally no rales or wheezes  Heart regular rate and rhythm without murmur gallop  Abdomen soft without hepatosplenomegaly masses or tenderness  Neurologically grossly intact  SHAMA-7 score is 7      Answers for HPI/ROS submitted by the patient on 4/13/2022  SHAMA 7 TOTAL SCORE: 7  Depression/Anxiety: Anxiety  Anxiety since last: : worse  Other associated symotome: : No  Significant life event: : other  Anxious:: Yes  Current substance use:: No  How many servings of fruits and vegetables do you eat daily?: 2-3  On average, how many sweetened beverages do you drink each day (Examples: soda, juice, sweet tea, etc.  Do NOT count diet or artificially sweetened beverages)?: 2  How many minutes a day do you exercise enough to make your heart beat faster?: 30 to 60  How many days a week do you exercise enough to make your heart  beat faster?: 6  How many days per week do you miss taking your medication?: 0  What is the reason for your visit today?: hot flashes, tiredness  When did your symptoms begin?: 3-4 weeks ago  What are your symptoms?: Hot flashes, tiredness  How would you describe these symptoms?: Mild  Are your symptoms:: Worsening  Have you had these symptoms before?: Yes  Have you tried or received treatment for these symptoms before?: Yes

## 2022-06-27 ENCOUNTER — TRANSCRIBE ORDERS (OUTPATIENT)
Dept: OTHER | Age: 67
End: 2022-06-27

## 2022-06-27 ENCOUNTER — TRANSFERRED RECORDS (OUTPATIENT)
Dept: HEALTH INFORMATION MANAGEMENT | Facility: CLINIC | Age: 67
End: 2022-06-27

## 2022-06-27 DIAGNOSIS — M25.569 KNEE PAIN: Primary | ICD-10-CM

## 2022-07-11 ENCOUNTER — THERAPY VISIT (OUTPATIENT)
Dept: PHYSICAL THERAPY | Facility: CLINIC | Age: 67
End: 2022-07-11
Attending: PHYSICIAN ASSISTANT
Payer: COMMERCIAL

## 2022-07-11 DIAGNOSIS — G89.29 CHRONIC PAIN OF LEFT KNEE: Primary | ICD-10-CM

## 2022-07-11 DIAGNOSIS — M25.562 CHRONIC PAIN OF LEFT KNEE: Primary | ICD-10-CM

## 2022-07-11 PROCEDURE — 97140 MANUAL THERAPY 1/> REGIONS: CPT | Mod: GP | Performed by: PHYSICAL THERAPIST

## 2022-07-11 PROCEDURE — 97110 THERAPEUTIC EXERCISES: CPT | Mod: GP | Performed by: PHYSICAL THERAPIST

## 2022-07-11 PROCEDURE — 97161 PT EVAL LOW COMPLEX 20 MIN: CPT | Mod: GP | Performed by: PHYSICAL THERAPIST

## 2022-07-11 ASSESSMENT — ACTIVITIES OF DAILY LIVING (ADL)
STAND: ACTIVITY IS NOT DIFFICULT
LIMPING: I DO NOT HAVE THE SYMPTOM
GIVING WAY, BUCKLING OR SHIFTING OF KNEE: I DO NOT HAVE THE SYMPTOM
SWELLING: I DO NOT HAVE THE SYMPTOM
GO DOWN STAIRS: ACTIVITY IS MINIMALLY DIFFICULT
PAIN: THE SYMPTOM AFFECTS MY ACTIVITY SLIGHTLY
STIFFNESS: THE SYMPTOM AFFECTS MY ACTIVITY SLIGHTLY
KNEE_ACTIVITY_OF_DAILY_LIVING_SUM: 59
KNEE_ACTIVITY_OF_DAILY_LIVING_SCORE: 84.29
SQUAT: ACTIVITY IS MINIMALLY DIFFICULT
RAW_SCORE: 59
WALK: ACTIVITY IS MINIMALLY DIFFICULT
SIT WITH YOUR KNEE BENT: ACTIVITY IS NOT DIFFICULT
KNEEL ON THE FRONT OF YOUR KNEE: ACTIVITY IS MINIMALLY DIFFICULT
WEAKNESS: THE SYMPTOM AFFECTS MY ACTIVITY SLIGHTLY
RISE FROM A CHAIR: ACTIVITY IS NOT DIFFICULT
GO UP STAIRS: ACTIVITY IS MINIMALLY DIFFICULT

## 2022-07-11 NOTE — PROGRESS NOTES
[facility]    OUTPATIENT Physical Therapy ORTHOPEDIC EVALUATION  PLAN OF TREATMENT FOR OUTPATIENT REHABILITATION  (COMPLETE FOR INITIAL CLAIMS ONLY)  Patient's Last Name, First Name, M.I.  YOB: 1955  DemetrioMaryann  I    Provider s Name:  [facility]   Medical Record No.  [id number]   Start of Care Date:  07/11/22   Onset Date:   11/01/21   Type:     _X__PT   ___OT Medical Diagnosis:    Encounter Diagnosis   Name Primary?    Chronic pain of left knee Yes        Treatment Diagnosis:  Left knee pain        Goals:     07/11/22 0500   Body Part   Goals listed below are for Left knee   Goal #1   Goal #1 ambulation   Previous Functional Level No restrictions   Current Functional Level [place] patient can walk   Performance Level 2-3 with increased pain at the knee   STG Target Performance [place] patient will be able to  walk   Performance Level 3 miles consistently without knee pain   Rationale for safe community ambulation   Due Date 07/25/22    LTG Target Performance [place] patient will be able to  walk   Performance Level 3+ consistently without knee pain   Rationale for safe community ambulation   Due Date 08/08/22       Therapy Frequency:  1x/week  Predicted Duration of Therapy Intervention:  4 weeks    Symone Tavera PT                 I CERTIFY THE NEED FOR THESE SERVICES FURNISHED UNDER        THIS PLAN OF TREATMENT AND WHILE UNDER MY CARE .             Physician Signature               Date    X_____________________________________________________                         Certification Date From:  07/11/22   Certification Date To:  08/08/22    Referring Provider:  Janett Viramontes    Initial Assessment        See Epic Evaluation SOC Date: 07/11/22

## 2022-07-11 NOTE — PROGRESS NOTES
KEY PT FINDINGS:  1) Distal quadriceps tightness  2) No limitations in motion at knee or ankle  3) Quad weakness/dysfunction    Physical Therapy Initial Evaluation: Subjective History     Injury/Condition Details:  Presenting Complaint Left knee   Onset Timing/Date November 2021   Mechanism Did have a fall and tripped over a carpet roll. Landed right on her knees. It started around that time but is unsure if it is connected.  Has been care giving for her  since November and feels she has been less active.       Symptom Behavior Details    Primary Symptoms Sporadic symptoms; Activity/position dependent, pain (Location: anterior medial and anterior lateral, denies posterior pain, denies directly anterior. , Quality: Aching/Throbbing), stiffness, weakness, swelling   Worst Pain 3/10 (with see below)   Symptom Provocators Long walk: 2-3 miles.   Pain at night in certain positions.   Minimal pain up/down.   Walking up and down the hill at the cabin  Notices stiffness when getting on and off the floor  Squatting (more apprehensive)    Best Pain 0/10    Symptom Relievers Activity modification, meds occasionally   Time of day dependent? No   Recent symptom change? symptoms improving over the last week.      Prior Testing/Intervention for current condition:  Prior Tests  x-ray   Prior Treatment none     Lifestyle & General Medical History:  Employment Retired > , retired 5 years ago   Usual physical activities  (within past year) Walking, active with gardening, doesn't really sit down   Orthopaedic history See Epic Chart   Notable medical history See Epic Chart   Patient Reported Health good     Lower Extremity Physical Therapy Examination    Dynamic Movement Screen:  2 leg stance: equal weight bearing, neutral alignment.   2 leg squat:Excessive anterior knee excursion (reduced posterior hip excursion) and Restricted ankle DF observed    1 leg stance:   Right: normal  Left: normal    Gait:  Non-antalgic, normal    Knee Joint ROM: no limits in motion. No hyperextension, no pain with OP.     Hip Joint ROM: symmetrical, no pain    Lower Extremity Muscle Strength (x/5)   Hip IR Hip ER Knee Ext Hip ABD Hip Ext Knee Flex   Right  5/5 5-/5 5/5 5-/5 5-/5 5/5   Left 5/5 5-/5 5/5 5-/5 5-/5 5/5     Basic Muscle Activation:  Transversus Abdominus: Fair control  Quadriceps: Right: Normal, Left: Normal     Knee Joint Effusion (Stroke Test Assessment):  Right: none  Left: none    Lower Extremity Flexibility Screen:  Hamstrings (Supine SLR): Right: +; Left: +  Gastroc (Supine Active DF): Right: +; Left: + (symmetrical ankle CKC DF)  Quadriceps (Prone KF): Right: +; Left: +    Palpation:   Tender to palpation at the following structures: None    Assessment/Plan:  Patient is a 67 year old female with left side knee complaints.    Patient has the following significant findings with corresponding treatment plan.                Diagnosis 1:  Left knee pain  Pain -  hot/cold therapy, manual therapy, self management and education  Decreased ROM/flexibility - manual therapy, therapeutic exercise and home program  Decreased strength - therapeutic exercise, therapeutic activities and home program  Decreased function - therapeutic activities and home program    Therapy Evaluation Codes:   1) History comprised of:   Personal factors that impact the plan of care:      None.    Comorbidity factors that impact the plan of care are:      None.     Medications impacting care: None.  2) Examination of Body Systems comprised of:   Body structures and functions that impact the plan of care:      Knee.   Activity limitations that impact the plan of care are:      Squatting/kneeling, Stairs and Walking.  3) Clinical presentation characteristics are:   Stable/Uncomplicated.  4) Decision-Making    Low complexity using standardized patient assessment instrument and/or measureable assessment of functional outcome.  Cumulative Therapy  Evaluation is: Low complexity.    Previous and current functional limitations:  (See Goal Flow Sheet for this information)    Short term and Long term goals: (See Goal Flow Sheet for this information)     Communication ability:  Patient appears to be able to clearly communicate and understand verbal and written communication and follow directions correctly.  Treatment Explanation - The following has been discussed with the patient:   RX ordered/plan of care  Anticipated outcomes  Possible risks and side effects  This patient would benefit from PT intervention to resume normal activities.   Rehab potential is good.    Frequency:  1 X week, once daily  Duration:  for 4 weeks  Discharge Plan:  Achieve all LTG.  Independent in home treatment program.  Reach maximal therapeutic benefit.    Please refer to the daily flowsheet for treatment today, total treatment time and time spent performing 1:1 timed codes.

## 2022-07-29 ENCOUNTER — THERAPY VISIT (OUTPATIENT)
Dept: PHYSICAL THERAPY | Facility: CLINIC | Age: 67
End: 2022-07-29
Payer: COMMERCIAL

## 2022-07-29 DIAGNOSIS — G89.29 CHRONIC PAIN OF LEFT KNEE: Primary | ICD-10-CM

## 2022-07-29 DIAGNOSIS — M25.562 CHRONIC PAIN OF LEFT KNEE: Primary | ICD-10-CM

## 2022-07-29 PROCEDURE — 97140 MANUAL THERAPY 1/> REGIONS: CPT | Mod: GP | Performed by: PHYSICAL THERAPIST

## 2022-07-29 PROCEDURE — 97110 THERAPEUTIC EXERCISES: CPT | Mod: GP | Performed by: PHYSICAL THERAPIST

## 2022-07-29 ASSESSMENT — ACTIVITIES OF DAILY LIVING (ADL)
GO UP STAIRS: ACTIVITY IS NOT DIFFICULT
GIVING WAY, BUCKLING OR SHIFTING OF KNEE: I DO NOT HAVE THE SYMPTOM
RISE FROM A CHAIR: ACTIVITY IS NOT DIFFICULT
LIMPING: I DO NOT HAVE THE SYMPTOM
KNEE_ACTIVITY_OF_DAILY_LIVING_SCORE: 92.86
PAIN: THE SYMPTOM AFFECTS MY ACTIVITY SLIGHTLY
STAND: ACTIVITY IS NOT DIFFICULT
SQUAT: ACTIVITY IS MINIMALLY DIFFICULT
WALK: ACTIVITY IS NOT DIFFICULT
SIT WITH YOUR KNEE BENT: ACTIVITY IS NOT DIFFICULT
GO DOWN STAIRS: ACTIVITY IS MINIMALLY DIFFICULT
STIFFNESS: I HAVE THE SYMPTOM BUT IT DOES NOT AFFECT MY ACTIVITY
KNEE_ACTIVITY_OF_DAILY_LIVING_SUM: 65
HOW_WOULD_YOU_RATE_THE_CURRENT_FUNCTION_OF_YOUR_KNEE_DURING_YOUR_USUAL_DAILY_ACTIVITIES_ON_A_SCALE_FROM_0_TO_100_WITH_100_BEING_YOUR_LEVEL_OF_KNEE_FUNCTION_PRIOR_TO_YOUR_INJURY_AND_0_BEING_THE_INABILITY_TO_PERFORM_ANY_OF_YOUR_USUAL_DAILY_ACTIVITIES?: 90
KNEEL ON THE FRONT OF YOUR KNEE: ACTIVITY IS NOT DIFFICULT
SWELLING: I DO NOT HAVE THE SYMPTOM
AS_A_RESULT_OF_YOUR_KNEE_INJURY,_HOW_WOULD_YOU_RATE_YOUR_CURRENT_LEVEL_OF_DAILY_ACTIVITY?: NEARLY NORMAL
HOW_WOULD_YOU_RATE_THE_OVERALL_FUNCTION_OF_YOUR_KNEE_DURING_YOUR_USUAL_DAILY_ACTIVITIES?: NEARLY NORMAL
WEAKNESS: I DO NOT HAVE THE SYMPTOM
RAW_SCORE: 65

## 2022-08-08 DIAGNOSIS — Z13.220 LIPID SCREENING: Primary | ICD-10-CM

## 2022-08-08 DIAGNOSIS — Z00.00 HEALTHCARE MAINTENANCE: ICD-10-CM

## 2022-08-10 ENCOUNTER — THERAPY VISIT (OUTPATIENT)
Dept: PHYSICAL THERAPY | Facility: CLINIC | Age: 67
End: 2022-08-10
Payer: COMMERCIAL

## 2022-08-10 DIAGNOSIS — G89.29 CHRONIC PAIN OF LEFT KNEE: Primary | ICD-10-CM

## 2022-08-10 DIAGNOSIS — M25.562 CHRONIC PAIN OF LEFT KNEE: Primary | ICD-10-CM

## 2022-08-10 PROCEDURE — 97140 MANUAL THERAPY 1/> REGIONS: CPT | Mod: GP | Performed by: PHYSICAL THERAPIST

## 2022-08-10 PROCEDURE — 97110 THERAPEUTIC EXERCISES: CPT | Mod: GP | Performed by: PHYSICAL THERAPIST

## 2022-08-11 ENCOUNTER — LAB (OUTPATIENT)
Dept: LAB | Facility: CLINIC | Age: 67
End: 2022-08-11
Payer: COMMERCIAL

## 2022-08-11 DIAGNOSIS — Z00.00 HEALTHCARE MAINTENANCE: ICD-10-CM

## 2022-08-11 DIAGNOSIS — Z13.220 LIPID SCREENING: ICD-10-CM

## 2022-08-11 LAB
ALBUMIN SERPL BCG-MCNC: 4.6 G/DL (ref 3.5–5.2)
ALBUMIN UR-MCNC: NEGATIVE MG/DL
ALP SERPL-CCNC: 84 U/L (ref 35–104)
ALT SERPL W P-5'-P-CCNC: 24 U/L (ref 10–35)
ANION GAP SERPL CALCULATED.3IONS-SCNC: 12 MMOL/L (ref 7–15)
APPEARANCE UR: CLEAR
AST SERPL W P-5'-P-CCNC: 23 U/L (ref 10–35)
BILIRUB SERPL-MCNC: 0.6 MG/DL
BILIRUB UR QL STRIP: NEGATIVE
BUN SERPL-MCNC: 12.3 MG/DL (ref 8–23)
CALCIUM SERPL-MCNC: 9.3 MG/DL (ref 8.8–10.2)
CHLORIDE SERPL-SCNC: 103 MMOL/L (ref 98–107)
CHOLEST SERPL-MCNC: 250 MG/DL
COLOR UR AUTO: YELLOW
CREAT SERPL-MCNC: 0.69 MG/DL (ref 0.51–0.95)
DEPRECATED HCO3 PLAS-SCNC: 25 MMOL/L (ref 22–29)
ERYTHROCYTE [DISTWIDTH] IN BLOOD BY AUTOMATED COUNT: 11.6 % (ref 10–15)
GFR SERPL CREATININE-BSD FRML MDRD: >90 ML/MIN/1.73M2
GLUCOSE SERPL-MCNC: 96 MG/DL (ref 70–99)
GLUCOSE UR STRIP-MCNC: NEGATIVE MG/DL
HCT VFR BLD AUTO: 39.9 % (ref 35–47)
HDLC SERPL-MCNC: 112 MG/DL
HGB BLD-MCNC: 13.4 G/DL (ref 11.7–15.7)
HGB UR QL STRIP: ABNORMAL
KETONES UR STRIP-MCNC: NEGATIVE MG/DL
LDLC SERPL CALC-MCNC: 123 MG/DL
LEUKOCYTE ESTERASE UR QL STRIP: NEGATIVE
MCH RBC QN AUTO: 30.7 PG (ref 26.5–33)
MCHC RBC AUTO-ENTMCNC: 33.6 G/DL (ref 31.5–36.5)
MCV RBC AUTO: 91 FL (ref 78–100)
MUCOUS THREADS #/AREA URNS LPF: PRESENT /LPF
NITRATE UR QL: NEGATIVE
NONHDLC SERPL-MCNC: 138 MG/DL
PH UR STRIP: 7.5 [PH] (ref 5–8)
PLATELET # BLD AUTO: 258 10E3/UL (ref 150–450)
POTASSIUM SERPL-SCNC: 3.7 MMOL/L (ref 3.4–5.3)
PROT SERPL-MCNC: 6.6 G/DL (ref 6.4–8.3)
RBC # BLD AUTO: 4.37 10E6/UL (ref 3.8–5.2)
RBC #/AREA URNS AUTO: ABNORMAL /HPF
SODIUM SERPL-SCNC: 140 MMOL/L (ref 136–145)
SP GR UR STRIP: 1.02 (ref 1–1.03)
SQUAMOUS #/AREA URNS AUTO: ABNORMAL /LPF
TRIGL SERPL-MCNC: 76 MG/DL
UROBILINOGEN UR STRIP-ACNC: 0.2 E.U./DL
WBC # BLD AUTO: 4.7 10E3/UL (ref 4–11)
WBC #/AREA URNS AUTO: ABNORMAL /HPF

## 2022-08-11 PROCEDURE — 80053 COMPREHEN METABOLIC PANEL: CPT

## 2022-08-11 PROCEDURE — 36415 COLL VENOUS BLD VENIPUNCTURE: CPT

## 2022-08-11 PROCEDURE — 81001 URINALYSIS AUTO W/SCOPE: CPT

## 2022-08-11 PROCEDURE — 80061 LIPID PANEL: CPT

## 2022-08-11 PROCEDURE — 85027 COMPLETE CBC AUTOMATED: CPT

## 2022-08-18 ENCOUNTER — OFFICE VISIT (OUTPATIENT)
Dept: INTERNAL MEDICINE | Facility: CLINIC | Age: 67
End: 2022-08-18
Payer: COMMERCIAL

## 2022-08-18 VITALS
BODY MASS INDEX: 24.16 KG/M2 | SYSTOLIC BLOOD PRESSURE: 130 MMHG | HEIGHT: 65 IN | DIASTOLIC BLOOD PRESSURE: 72 MMHG | WEIGHT: 145 LBS | OXYGEN SATURATION: 99 % | HEART RATE: 76 BPM

## 2022-08-18 DIAGNOSIS — F41.9 ANXIETY: ICD-10-CM

## 2022-08-18 DIAGNOSIS — G89.29 CHRONIC PAIN OF LEFT KNEE: ICD-10-CM

## 2022-08-18 DIAGNOSIS — M25.562 CHRONIC PAIN OF LEFT KNEE: ICD-10-CM

## 2022-08-18 DIAGNOSIS — R23.2 HOT FLASHES: ICD-10-CM

## 2022-08-18 DIAGNOSIS — Z00.00 ENCOUNTER FOR MEDICARE ANNUAL WELLNESS EXAM: Primary | ICD-10-CM

## 2022-08-18 DIAGNOSIS — Z78.0 POSTMENOPAUSAL STATUS: ICD-10-CM

## 2022-08-18 PROBLEM — R00.2 PALPITATIONS: Status: RESOLVED | Noted: 2021-07-13 | Resolved: 2022-08-18

## 2022-08-18 PROBLEM — K21.9 GERD WITHOUT ESOPHAGITIS: Status: RESOLVED | Noted: 2021-07-13 | Resolved: 2022-08-18

## 2022-08-18 PROCEDURE — G0439 PPPS, SUBSEQ VISIT: HCPCS | Performed by: INTERNAL MEDICINE

## 2022-08-18 PROCEDURE — 99213 OFFICE O/P EST LOW 20 MIN: CPT | Mod: 25 | Performed by: INTERNAL MEDICINE

## 2022-08-18 ASSESSMENT — ENCOUNTER SYMPTOMS
PARESTHESIAS: 0
CONSTIPATION: 0
BREAST MASS: 0
FEVER: 0
PALPITATIONS: 0
COUGH: 0
DIARRHEA: 0
FREQUENCY: 0
HEMATOCHEZIA: 0
WEAKNESS: 0
ABDOMINAL PAIN: 0
JOINT SWELLING: 0
HEARTBURN: 0
SORE THROAT: 0
NERVOUS/ANXIOUS: 0
HEMATURIA: 0
ARTHRALGIAS: 0
SHORTNESS OF BREATH: 0
EYE PAIN: 0
DIZZINESS: 0
CHILLS: 0
NAUSEA: 0
DYSURIA: 0
HEADACHES: 0
MYALGIAS: 0

## 2022-08-18 ASSESSMENT — ACTIVITIES OF DAILY LIVING (ADL): CURRENT_FUNCTION: NO ASSISTANCE NEEDED

## 2022-08-18 NOTE — PROGRESS NOTES
"SUBJECTIVE:   Maryann Atkinson is a 67 year old female who presents for Preventive Visit.    [id number]old woman here for annual wellness visit and to follow-up medical problems including anxiety and ongoing hot flashes.  See assessment and plan for details.    Patient has been advised of split billing requirements and indicates understanding: Yes  Are you in the first 12 months of your Medicare coverage?  No    Healthy Habits:     In general, how would you rate your overall health?  Good    Frequency of exercise:  6-7 days/week    Duration of exercise:  45-60 minutes    Do you usually eat at least 4 servings of fruit and vegetables a day, include whole grains    & fiber and avoid regularly eating high fat or \"junk\" foods?  Yes    Taking medications regularly:  Yes    Medication side effects:  Not applicable    Ability to successfully perform activities of daily living:  No assistance needed    Home Safety:  No safety concerns identified    Hearing Impairment:  No hearing concerns    In the past 6 months, have you been bothered by leaking of urine?  No    In general, how would you rate your overall mental or emotional health?  Good      PHQ-2 Total Score: 0    Additional concerns today:  No    Do you feel safe in your environment? Yes    Have you ever done Advance Care Planning? (For example, a Health Directive, POLST, or a discussion with a medical provider or your loved ones about your wishes): Yes       Fall risk  Fallen 2 or more times in the past year?: No  Any fall with injury in the past year?: No  0  Cognitive Screening   1) Repeat 3 items (Leader, Season, Table)    2) Clock draw: NORMAL  3) 3 item recall: Recalls 3 objects  Results: 3 items recalled: COGNITIVE IMPAIRMENT LESS LIKELY    Mini-CogTM Copyright OLIVER Puckett. Licensed by the author for use in [facility]; reprinted with permission (amada@.[email or web address]). All rights reserved.      Do you have sleep apnea, excessive snoring or daytime " drowsiness?: no    Reviewed and updated as needed this visit by clinical staff   Tobacco  Allergies  Meds                Reviewed and updated as needed this visit by Provider                   Social History     Tobacco Use     Smoking status: Never Smoker     Smokeless tobacco: Never Used   Substance Use Topics     Alcohol use: Not Currently     Comment: Alcoholic Drinks/day: rare     If you drink alcohol do you typically have >3 drinks per day or >7 drinks per week? No    Alcohol Use 8/18/2022   Prescreen: >3 drinks/day or >7 drinks/week? No   Prescreen: >3 drinks/day or >7 drinks/week? -               Current providers sharing in care for this patient include:   Patient Care Team:  Richar Rodas MD as PCP - General  Richar Rodas MD as Assigned PCP    The following health maintenance items are reviewed in Epic and correct as of today:  Health Maintenance Due   Topic Date Due     DEXA  Never done     ZOSTER IMMUNIZATION (1 of 2) Never done     COVID-19 Vaccine (4 - Booster for Moderna series) 03/03/2022           Breast CA Risk Assessment (FHS-7) 8/18/2022   Do you have a family history of breast, colon, or ovarian cancer? No / Unknown           Pertinent mammograms are reviewed under the imaging tab.    Review of Systems   Constitutional: Negative for chills and fever.   HENT: Negative for congestion, ear pain, hearing loss and sore throat.    Eyes: Negative for pain and visual disturbance.   Respiratory: Negative for cough and shortness of breath.    Cardiovascular: Negative for chest pain, palpitations and peripheral edema.   Gastrointestinal: Negative for abdominal pain, constipation, diarrhea, heartburn, hematochezia and nausea.   Breasts:  Negative for tenderness, breast mass and discharge.   Genitourinary: Negative for dysuria, frequency, genital sores, hematuria, pelvic pain, urgency, vaginal bleeding and vaginal discharge.   Musculoskeletal: Negative for arthralgias, joint swelling and  "myalgias.   Skin: Negative for rash.   Neurological: Negative for dizziness, weakness, headaches and paresthesias.   Psychiatric/Behavioral: Negative for mood changes. The patient is not nervous/anxious.          OBJECTIVE:   /72 (BP Location: Right arm, Patient Position: Sitting, Cuff Size: Adult Regular)   Pulse 76   Ht 1.651 m (5' 5\")   Wt 65.8 kg (145 lb)   SpO2 99%   BMI 24.13 kg/m   Estimated body mass index is 24.13 kg/m  as calculated from the following:    Height as of this encounter: 1.651 m (5' 5\").    Weight as of this encounter: 65.8 kg (145 lb).  Physical Exam  EYES: Eyelids, conjunctiva, and sclera were normal. Pupils were normal. Cornea, iris, and lens were normal bilaterally.  HEAD, EARS, NOSE, MOUTH, AND THROAT: Head and face were normal. TMs and external auditory canals are normal  NECK: Neck appearance was normal. There were no neck masses and the thyroid was not enlarged and no nodules are felt.  No lymphadenopathy.  RESPIRATORY: Breathing pattern was normal and the chest moved symmetrically.   Lung sounds were normal and there were no rales or wheezes.  CARDIOVASCULAR: Heart rate and rhythm were normal.  S1 and S2 were normal and there were no extra sounds or murmurs. Peripheral pulses in arms and legs were normal.  There was no peripheral edema.  No carotid bruits.  GASTROINTESTINAL:  Bowel sounds were present.   Palpation detected no tenderness, mass, or enlarged organs.   MUSCULOSKELETAL: Skeletal configuration was normal and muscle mass was normal for age. Joint appearance was overall normal.  LYMPHATIC: There were no enlarged nodes.  SKIN/HAIR/NAILS: Skin color was normal.  There were no concerning skin lesions.  NEUROLOGIC: The patient was alert and oriented to person, place, time, and circumstance. Speech was normal. Cranial nerves were normal. Motor strength was normal for age. The patient was normally coordinated.  Sensation intact.  PSYCHIATRIC:  Mood and affect were normal " "and the patient had normal recent and remote memory. The patient's judgment and insight were normal.        ASSESSMENT / PLAN:   1. Encounter for Medicare annual wellness exam  Immunizations are reviewed and recommending flu shot this fall along with COVID booster, Shingrix and updating her tetanus.  She has a living will.  Non-smoker.  Uses alcohol in moderation.  Regular exercise and good diet habits to maintain a healthy weight discussed.  Up to date with colonoscopies and this should be repeated in 2030.  Recommending annual mammogram for breast cancer screening.  DEXA will be scheduled for osteoporosis screening.  Dementia and depression screening completed.  Recommending annual eye exam.  Recommending seeing a dentist every 6 months.  Skin exam performed and recommending regular use of sunblock.  Hepatitis C antibody for screening was normal.  Screened for diabetes with fasting glucose.  Fasting lipid profile looks good.    2. Anxiety  Unable to tolerate venlafaxine causing side effects.  She will use lorazepam infrequently.  30 tablets prescribed in April are still available.  Overall stress levels are lower and anxiety better controlled.    3. Hot flashes  Ongoing heat intolerance/hot flashes although not as bothersome as this spring.  Otherwise feeling well.  Labs including TSH and CBC are normal.  She will make an appointment with OB/GYN.    4. Postmenopausal status    - DEXA HIP/PELVIS/SPINE - Future; Future    5. Chronic pain of left knee  She has seen orthopedics and is doing exercises provided by physical therapy    6.  GERD symptoms have resolved.  No longer needing famotidine        Patient has been advised of split billing requirements and indicates understanding: Yes        Estimated body mass index is 24.13 kg/m  as calculated from the following:    Height as of this encounter: 1.651 m (5' 5\").    Weight as of this encounter: 65.8 kg (145 lb).        She reports that she has never smoked. She has " never used smokeless tobacco.      Appropriate preventive services were discussed with this patient, including applicable screening as appropriate for cardiovascular disease, diabetes, osteopenia/osteoporosis, and glaucoma.  As appropriate for age/gender, discussed screening for colorectal cancer, prostate cancer, breast cancer, and cervical cancer. Checklist reviewing preventive services available has been given to the patient.    Reviewed patients plan of care and provided an AVS. The Basic Care Plan (routine screening as documented in Health Maintenance) for Maryann meets the Care Plan requirement. This Care Plan has been established and reviewed with the Patient.    Counseling Resources:  ATP IV Guidelines  Pooled Cohorts Equation Calculator  Breast Cancer Risk Calculator  Breast Cancer: Medication to Reduce Risk  FRAX Risk Assessment  ICSI Preventive Guidelines  Dietary Guidelines for Americans, 2010  USDA's MyPlate  ASA Prophylaxis  Lung CA Screening    Richar Rodas MD  [facility]    Identified Health Risks:

## 2022-08-18 NOTE — PATIENT INSTRUCTIONS
Annual flu shot every fall    Recommending shingles vaccine, Shingrix.  This can be obtained at your pharmacy    I would suggest getting the new COVID booster when it is available this fall    Your tetanus vaccine should be updated before March 2023.  You can get a Td vaccine at your pharmacy as well    Annual mammogram.  You can call [phone] to schedule    DEXA should be scheduled for osteoporosis screening    Colonoscopy will be due in 2030    Annual eye exam    Dr Salomon or Dr Medina are both excellent OB/GYN specialists.  You can contact [facility] at [phone]      Patient Education   Personalized Prevention Plan  You are due for the preventive services outlined below.  Your care team is available to assist you in scheduling these services.  If you have already completed any of these items, please share that information with your care team to update in your medical record.  Health Maintenance Due   Topic Date Due    Osteoporosis Screening  Never done    Zoster (Shingles) Vaccine (1 of 2) Never done    COVID-19 Vaccine (4 - Booster for Moderna series) 03/03/2022

## 2022-10-20 ENCOUNTER — ANCILLARY PROCEDURE (OUTPATIENT)
Dept: MAMMOGRAPHY | Facility: CLINIC | Age: 67
End: 2022-10-20
Attending: INTERNAL MEDICINE
Payer: COMMERCIAL

## 2022-10-20 DIAGNOSIS — Z12.31 VISIT FOR SCREENING MAMMOGRAM: ICD-10-CM

## 2022-10-20 PROCEDURE — 77063 BREAST TOMOSYNTHESIS BI: CPT | Mod: TC | Performed by: RADIOLOGY

## 2022-10-20 PROCEDURE — 77067 SCR MAMMO BI INCL CAD: CPT | Mod: TC | Performed by: RADIOLOGY

## 2022-11-21 ENCOUNTER — HEALTH MAINTENANCE LETTER (OUTPATIENT)
Age: 67
End: 2022-11-21

## 2022-12-27 ENCOUNTER — MYC MEDICAL ADVICE (OUTPATIENT)
Dept: INTERNAL MEDICINE | Facility: CLINIC | Age: 67
End: 2022-12-27

## 2022-12-27 DIAGNOSIS — R21 RASH: Primary | ICD-10-CM

## 2022-12-27 RX ORDER — TRIAMCINOLONE ACETONIDE 1 MG/G
CREAM TOPICAL 2 TIMES DAILY PRN
Qty: 15 G | Refills: 3 | Status: SHIPPED | OUTPATIENT
Start: 2022-12-27

## 2023-01-11 ENCOUNTER — OFFICE VISIT (OUTPATIENT)
Dept: CARDIOLOGY | Facility: CLINIC | Age: 68
End: 2023-01-11
Payer: MEDICARE

## 2023-01-11 VITALS
HEIGHT: 64 IN | BODY MASS INDEX: 20.49 KG/M2 | HEART RATE: 54 BPM | SYSTOLIC BLOOD PRESSURE: 138 MMHG | DIASTOLIC BLOOD PRESSURE: 68 MMHG | OXYGEN SATURATION: 98 % | WEIGHT: 120 LBS

## 2023-01-11 DIAGNOSIS — I49.1 PREMATURE ATRIAL CONTRACTIONS: ICD-10-CM

## 2023-01-11 DIAGNOSIS — I48.0 PAROXYSMAL ATRIAL FIBRILLATION: Primary | ICD-10-CM

## 2023-01-11 PROCEDURE — 99213 OFFICE O/P EST LOW 20 MIN: CPT | Performed by: INTERNAL MEDICINE

## 2023-01-11 PROCEDURE — 93000 ELECTROCARDIOGRAM COMPLETE: CPT | Performed by: INTERNAL MEDICINE

## 2023-01-11 RX ORDER — RIMEGEPANT SULFATE 75 MG/75MG
TABLET, ORALLY DISINTEGRATING ORAL AS NEEDED
COMMUNITY

## 2023-01-11 RX ORDER — FLAVOXATE HYDROCHLORIDE 100 MG/1
100 TABLET ORAL
COMMUNITY
End: 2023-01-11

## 2023-01-11 RX ORDER — ZOLMITRIPTAN 2.5 MG/1
2.5 TABLET, FILM COATED ORAL
COMMUNITY
End: 2023-01-11

## 2023-01-11 RX ORDER — HYOSCYAMINE SULFATE 0.125 MG
TABLET ORAL
COMMUNITY
End: 2023-01-11

## 2023-01-11 RX ORDER — HYDROXYZINE HYDROCHLORIDE 10 MG/1
10 TABLET, FILM COATED ORAL DAILY
COMMUNITY

## 2023-01-11 RX ORDER — TOPIRAMATE 25 MG/1
25 TABLET ORAL DAILY
COMMUNITY
End: 2023-01-11

## 2023-01-11 RX ORDER — EPTINEZUMAB-JJMR 100 MG/ML
100 INJECTION INTRAVENOUS
COMMUNITY
Start: 2023-01-03

## 2023-01-11 NOTE — PROGRESS NOTES
Kiesha PADRON Ke  1955  [phone]    01/11/2023    [facility]     Referring Provider: No ref. provider found     Tyler Rucker MD  [address] [place] [address]    Chief Complaint   Patient presents with   • PAF       Problem List:      1. Paroxysmal Atrial Fibrillation  a. CHADSVasc = 2 on Eliquis  b. 24 Hour Holter 3/2018: no atrial fibrillation.   c. Diagnosed at PCP by EKG with symptoms of palpitations 11/15/18  d. Treated with metoprolol - caused low BP and low HR  e. Flecainide started 12/2018 - intolerant due to HA  f. Echocardiogram 2/15/18: EF 60%, mild TR  g. Stress Test 4/9/18: EF 69%, no ischemia. Low risk study.   h. Admit to [facility] ER for Atypical chest pain, Negative markers. Normal EKG Sinus Bradycardia.  i. 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 Echocardiogram 2/12/2020: EF 58%, no significant structural or functional valvular disease  k. Stress Echocardiogram 2/24/2021: EF 58%, no EKG evidence of ischemia, Normal stress echo with no significant echocardiographic evidence for myocardial ischemia.  l. Event Monitor 11/3-12/4/2021: No atrial fibrillation, occasional PACs and paroxysmal atrial tachycardia  2. Frequent UTIs  3. Migraines  4. Colon polyps  5. Chronic nausea  6. Surgical History  a. Hyterectomy  b. Total hip arthroplasty  c. Vein surgery    Allergies  Allergies   Allergen Reactions   • Bactrim [Sulfamethoxazole-Trimethoprim] Hives   • Tramadol Shortness Of Breath, Nausea Only and Other (See Comments)     CHEST PAIN   • Flecainide Other (See Comments)     Headaches         Current Medications    Current Outpatient Medications:   •  aspirin 81 MG EC tablet, Take 81 mg by mouth Daily., Disp: , Rfl:   •  Eptinezumab-jjmr (Vyepti) 100 MG/ML solution solution, Infuse 100 mg into a venous catheter Every 3 (Three) Months., Disp: , Rfl:   •  Estriol 10 % cream, Insert 1 mL into the vagina Daily As Needed., Disp: , Rfl: 5  •  hydrOXYzine  "(ATARAX) 10 MG tablet, Take 10 mg by mouth Daily., Disp: , Rfl:   •  Rimegepant Sulfate (Nurtec) 75 MG tablet dispersible tablet, Take  by mouth As Needed., Disp: , Rfl:     History of Present Illness     Pt presents for follow up of atrial fibrillation, PVCs, PACs. Since we last saw the pt, she was doing well up until 11/2022 when she got the Flu just after thanksgiving. She then got the flu again after Nicole and just feels like she cannot get over it. She has felt weak and like she can't get a deep breath since then.  She thinks she has had 3 sporadic episodes of feeling weak with pain in her chest with dizziness that lasts for 20 minutes and goes away with rest. Not triggered by anything or exertion. No AF or palps. 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 . No syncope. She is on a baby ASA and denies bleeding issues. No CVA like symptoms.  Does not use CPAP + pleuritic CP since flu    ROS:  General:  + fatigue, No weight gain or loss  Cardiovascular:  Denies CP, PND, syncope, near syncope, edema or palpitations.  Pulmonary:  Denies LEON, cough, or wheezing      Vitals:    01/11/23 1130   BP: 138/68   BP Location: Left arm   Patient Position: Sitting   Pulse: 54   SpO2: 98%   Weight: 54.4 kg (120 lb)   Height: 162.6 cm (64\")     Body mass index is 20.6 kg/m².  PE:  General: NAD  Neck: no JVD, no carotid bruits, no TM  Heart RRR, NL S1, S2, S4 present, no rubs, murmurs  Lungs: CTA, no wheezes, rhonchi, or rales  Abd: soft, non-tender, NL BS  Ext: No musculoskeletal deformities, no edema, cyanosis, or clubbing  Psych: normal mood and affect    Diagnostic Data:        ECG 12 Lead    Date/Time: 1/11/2023 12:05 PM  Performed by: Bruce Rodriguez MD  Authorized by: Bruce Rodriguez MD   Comparison: compared with previous ECG from 2/5/2021  Similar to previous ECG  Rhythm: sinus bradycardia  BPM: 54              1. Paroxysmal atrial fibrillation (HCC)    2. Premature atrial contractions  "         Plan:  1. PAF:  - s/p cryo ablation 10/2019  - no recurrence off AAD on recent event monitor  - CHADSvasc = 1 (age) on ASA        2. PACs/PVCs:   -rarely seen on monitor  - monitor for now, overall not limiting to daily activity    F/up in 12 months    Scribed for Bruce Rodriguez MD by Ester Mayer PA-C. 1/11/2023  12:06 EST     I, Bruce Rodriguez MD, personally performed the services described in this documentation as scribed by the above named individual in my presence, and it is both accurate and complete.  1/11/2023  12:06 EST

## 2023-01-26 ENCOUNTER — MYC MEDICAL ADVICE (OUTPATIENT)
Dept: INTERNAL MEDICINE | Facility: CLINIC | Age: 68
End: 2023-01-26
Payer: COMMERCIAL

## 2023-01-26 ENCOUNTER — NURSE TRIAGE (OUTPATIENT)
Dept: INTERNAL MEDICINE | Facility: CLINIC | Age: 68
End: 2023-01-26
Payer: COMMERCIAL

## 2023-01-26 DIAGNOSIS — R50.9 FEVER, UNSPECIFIED FEVER CAUSE: ICD-10-CM

## 2023-01-26 DIAGNOSIS — U07.1 INFECTION DUE TO 2019 NOVEL CORONAVIRUS: ICD-10-CM

## 2023-01-26 NOTE — TELEPHONE ENCOUNTER
Started triage due to MyChart. Patient tested positive for COVID on 1/26/23. Symptoms of mild clear nasal drainage, HA, sore throat that has resolved, fever and body aches started on 1/24/23. Denied cough, SOB, wheezing, chest pain. Patient is taking tylenol for low grade fever 100.4-99.8. Patient is eligible for COVID treatment and RN protocol started.     Arlin Cali RN    Reason for Disposition    [1] HIGH RISK for severe COVID complications (e.g., weak immune system, age > 64 years, obesity with BMI of 30 or higher, pregnant, chronic lung disease or other chronic medical condition) AND [2] COVID symptoms (e.g., cough, fever)  (Exceptions: Already seen by PCP and no new or worsening symptoms.)    Additional Information    Negative: SEVERE difficulty breathing (e.g., struggling for each breath, speaks in single words)    Negative: Difficult to awaken or acting confused (e.g., disoriented, slurred speech)    Negative: Bluish (or gray) lips or face now    Negative: Shock suspected (e.g., cold/pale/clammy skin, too weak to stand, low BP, rapid pulse)    Negative: Sounds like a life-threatening emergency to the triager    Negative: [1] Diagnosed or suspected COVID-19 AND [2] symptoms lasting 3 or more weeks    Negative: [1] COVID-19 exposure AND [2] no symptoms    Negative: COVID-19 vaccine reaction suspected (e.g., fever, headache, muscle aches) occurring 1 to 3 days after getting vaccine    Negative: COVID-19 vaccine, questions about    Negative: [1] Lives with someone known to have influenza (flu test positive) AND [2] flu-like symptoms (e.g., cough, runny nose, sore throat, SOB; with or without fever)    Negative: [1] Adult with possible COVID-19 symptoms AND [2] triager concerned about severity of symptoms or other causes    Negative: COVID-19 and breastfeeding, questions about    Negative: SEVERE or constant chest pain or pressure  (Exception: Mild central chest pain, present only when coughing.)    Negative:  MODERATE difficulty breathing (e.g., speaks in phrases, SOB even at rest, pulse 100-120)    Negative: Headache and stiff neck (can't touch chin to chest)    Negative: Oxygen level (e.g., pulse oximetry) 90 percent or lower    Negative: Chest pain or pressure  (Exception: MILD central chest pain, present only when coughing)    Negative: Patient sounds very sick or weak to the triager    Protocols used: CORONAVIRUS (COVID-19) DIAGNOSED OR [id number]    RN COVID TREATMENT VISIT  01/26/23    Maryann Atkinson  67 year old  Current weight? 145    Has the patient been seen by a primary care provider at an [facility] or [facility] within the past two years? Yes.   Have you been in close proximity to/do you have a known exposure to a person with a confirmed case of influenza? No.     Date of positive COVID test (PCR or at home)? 1/26/23    Current COVID symptoms: fever or chills, fatigue, muscle or body aches, headache and congestion or runny nose    Date COVID symptoms began: 1/24/23    Do you have any of the following conditions that place you at risk of being very sick from COVID-19? 65 years or older    Is patient eligible to continue? Yes, established patient, 12 years or older weighing at least 88.2 lbs, who has COVID symptoms that started in the past 5 days and is at risk for being very sick from COVID-19.       Have you received monoclonal antibodies or oral antiviral medications since testing positive to COVID-19? No    Are you currently hospitalized for COVID-19? No    Do you have a history of hepatitis? No    Are you currently pregnant or nursing? No    Do you have a clinically significant hypersensitivity to nirmatrelvir, ritonavir, or molnupiravir? No    Do you have any history of severe renal impairment (eGFR < 30mL/min)? No    Do you have any history of hepatic impairment or abnormalities (e.g. hepatic panel, ALT, AST, ALK Phos, bilirubin)? No    Have you had a coronary stent  placed in the previous 6 months? No    Is patient eligible to continue?   Yes, patient meets all eligibility requirements for the RN COVID treatment (as denoted by all no responses above).     Current Outpatient Medications   Medication Sig Dispense Refill     famotidine (PEPCID) 10 MG tablet Take 1 tablet (10 mg) by mouth 2 times daily (Patient not taking: Reported on 8/18/2022)       LORazepam (ATIVAN) 0.5 MG tablet TAKE 1 TABLET BY MOUTH EVERY DAY AS NEEDED 30 tablet 0     triamcinolone (KENALOG) 0.1 % external cream Apply topically 2 times daily as needed for irritation 15 g 3     Medications from List 1 of the standing order (on medications that exclude the use of Paxlovid) that patient is taking: NONE. Is patient taking [place]'s Wort? No  Is patient taking Ting's Wort or any meds from List 1? No.   Medications from List 2 of the standing order (on meds that provider needs to adjust) that patient is taking: NONE. Is patient on any of the meds from List 2? No.   Medications from List 3 of standing order (on meds that a RN needs to adjust) that patient is taking: NONE. Is patient on any meds from List 3? No.   In order of efficacy, Paxlovid has an approximate 90% reduction in hospitalization. Paxlovid can possibly cause altered sense of taste, diarrhea (loose, watery stools), high blood pressure, muscle aches.  The other option is molnupiravir which has an approximate 30% reduction in hospitalization. Molnupirarivr can possibly cause diarrhea (loose, watery stools), nausea (feeling sick to your stomach), dizziness, headaches.    Which treatment option does the patient prefer?   Paxlovid.   Lab Results   Component Value Date    GFRESTIMATED >90 08/11/2022       Was last eGFR reduced? No, eGFR 60 or greater/ No Result on record. Patient can receive the normal renal function dose. Paxlovid Rx sent to [facility]   [place]    Temporary change to home medications: None    All medication adjustments  (holds, etc) were discussed with the patient and patient was asked to repeat back (teachback) their med adjustment.  Did patient understand med adjustment? No medication adjustments needed.         Reviewed the following instructions with the patient:    Paxlovid (nimatrelvir and ritonavir)    How it works  Two medicines (nirmatrelvir and ritonavir) are taken together. They stop the virus from growing. Less amount of virus is easier for your body to fight.    How to take    Medicine comes in a daily container with both medicine tablets. Take by mouth twice daily (once in the morning, once at night) for 5 days.    The number of tablets to take varies by patient.    Don't chew or break capsules. Swallow whole.    When to take  Take as soon as possible after positive COVID-19 test result, and within 5 days of your first symptoms.    Possible side effects  Can cause altered sense of taste, diarrhea (loose, watery stools), high blood pressure, muscle aches.    Arlin Cali RN

## 2023-01-29 ENCOUNTER — MYC MEDICAL ADVICE (OUTPATIENT)
Dept: INTERNAL MEDICINE | Facility: CLINIC | Age: 68
End: 2023-01-29
Payer: COMMERCIAL

## 2023-01-30 NOTE — TELEPHONE ENCOUNTER
Outbound call regarding Pt message.     Pt reports she stopped taking the Paxlovid over the weekend due to altered sense of taste and diarrhea.   Pt reports she couldn't get the bad taste out of her mouth and it made her feel nauseated and dropped her appetite.     Pt stopped taking it and those symptoms have resolved.     Pt does report her Covid symptoms are quite mild as well.   She states her only symptom is just some congestion. No fever, no SOB, no body aches.     Pt reports she will take OTC cold meds to help and will let us know if any other concerns arise.     Routing as FYI.     Thank you,   Brenda Goel RN, BSN  [facility]

## 2023-02-08 ENCOUNTER — TELEPHONE (OUTPATIENT)
Dept: CARDIOLOGY | Facility: CLINIC | Age: 68
End: 2023-02-08
Payer: MEDICARE

## 2023-02-08 NOTE — TELEPHONE ENCOUNTER
Agree with stress test. She has been having this chest pain since she had the flu. It would be good to do the stress test. If she has worsening symptoms or CP that is unrelieved, she should go to the ED.

## 2023-02-08 NOTE — TELEPHONE ENCOUNTER
Received a call from Dr. Rucker's office stating that patient was recently in their office with c/o chest pain, SOB. The chest pain will wake her up at night and will radiate into her jaw. Dr. Rucker is going to order a stress test however he was wondering if anything else was recommended of if patient should be seen in our office.   Dr. Rucker's office is faxing over office note.

## 2023-02-09 NOTE — TELEPHONE ENCOUNTER
I contacted Dr. Rucker's office regarding this, no answer. I left a voicemail for a return call.  I called patient and updated her on this. She voiced understanding.

## 2023-02-14 DIAGNOSIS — F41.1 GAD (GENERALIZED ANXIETY DISORDER): ICD-10-CM

## 2023-02-14 RX ORDER — LORAZEPAM 0.5 MG/1
TABLET ORAL
Qty: 30 TABLET | Refills: 0 | Status: SHIPPED | OUTPATIENT
Start: 2023-02-14 | End: 2024-01-29

## 2023-05-30 NOTE — PROGRESS NOTES
[facility]    OUTPATIENT Physical Therapy ORTHOPEDIC EVALUATION  PLAN OF TREATMENT FOR OUTPATIENT REHABILITATION  (COMPLETE FOR INITIAL CLAIMS ONLY)  Patient's Last Name, First Name, M.I.  YOB: 1955  DemetrioMaryann  JULIETA    Provider s Name:  [facility]   Medical Record No.  [id number]   Start of Care Date:  07/11/22   Onset Date:   11/01/21   Type:     _X__PT   ___OT Medical Diagnosis:  No diagnosis found.     Treatment Diagnosis:  Left knee pain        Goals:     08/10/22 0500   Body Part   Goals listed below are for Left knee   Goal #1   Goal #1 ambulation   Previous Functional Level No restrictions   Current Functional Level [place] patient can walk   Performance Level 2-3 with increased pain at the knee   STG Target Performance [place] patient will be able to  walk   Performance Level 3 miles consistently without knee pain   Rationale for safe community ambulation   Due Date 08/19/22   Date Goal Met 08/10/22    LTG Target Performance [place] patient will be able to  walk   Performance Level 3+ consistently without knee pain   Rationale for safe community ambulation   Due Date 09/09/22       Therapy Frequency:  1x/week  Predicted Duration of Therapy Intervention:  1 week    Symone Tavera PT                 I CERTIFY THE NEED FOR THESE SERVICES FURNISHED UNDER        THIS PLAN OF TREATMENT AND WHILE UNDER MY CARE .             Physician Signature               Date    X_____________________________________________________                         Certification Date From:  08/08/22   Certification Date To:  08/10/22    Referring Provider:  Janett Viramontes    Initial Assessment        See Epic Evaluation SOC Date: 07/11/22                                                                    breast self-exam

## 2023-08-22 ENCOUNTER — OFFICE VISIT (OUTPATIENT)
Dept: NEUROSURGERY | Facility: CLINIC | Age: 68
End: 2023-08-22
Payer: MEDICARE

## 2023-08-22 VITALS — TEMPERATURE: 96.9 F | WEIGHT: 119 LBS | HEIGHT: 63 IN | BODY MASS INDEX: 21.09 KG/M2

## 2023-08-22 DIAGNOSIS — M51.36 DDD (DEGENERATIVE DISC DISEASE), LUMBAR: ICD-10-CM

## 2023-08-22 DIAGNOSIS — M43.16 SPONDYLOLISTHESIS OF LUMBAR REGION: ICD-10-CM

## 2023-08-22 DIAGNOSIS — M54.16 LUMBAR RADICULOPATHY: Primary | ICD-10-CM

## 2023-08-22 PROCEDURE — 99203 OFFICE O/P NEW LOW 30 MIN: CPT | Performed by: NEUROLOGICAL SURGERY

## 2023-08-22 PROCEDURE — 1160F RVW MEDS BY RX/DR IN RCRD: CPT | Performed by: NEUROLOGICAL SURGERY

## 2023-08-22 PROCEDURE — 1159F MED LIST DOCD IN RCRD: CPT | Performed by: NEUROLOGICAL SURGERY

## 2023-08-22 RX ORDER — RIMEGEPANT SULFATE 75 MG/75MG
75 TABLET, ORALLY DISINTEGRATING ORAL
COMMUNITY
Start: 2023-07-31

## 2023-08-22 RX ORDER — GABAPENTIN 100 MG/1
100 CAPSULE ORAL
COMMUNITY
Start: 2023-08-17

## 2023-08-22 RX ORDER — AZITHROMYCIN 500 MG/1
TABLET, FILM COATED ORAL
COMMUNITY
Start: 2023-08-20

## 2023-08-22 RX ORDER — MEFLOQUINE HYDROCHLORIDE 250 MG/1
TABLET ORAL
COMMUNITY
Start: 2023-08-20

## 2023-08-22 NOTE — PROGRESS NOTES
Patient: Kiesha Dempsey  : 1955    Primary Care Provider: Tyler Rucker MD    Requesting Provider: As above        History    Chief Complaint: Low back and left leg pain.    History of Present Illness: Ms. Dempsey is a 68-year-old unemployed woman who I saw in  for some neck and upper extremity symptoms.  After traveling to [place] in  she developed profound pain in her back extending into an approximately L4 distribution of her left lower extremity.  She has some associated numbness.  On 1 occasion she had symptoms in the right leg.  She has done physical therapy for about a month.  She is taking Tylenol.  Over last couple of days her symptoms have been minimal.  In general she is worse with sitting, protracted standing, lying down.  She is due to go on a mission trip to [place] in several days.    Review of Systems   Constitutional:  Positive for fatigue. Negative for activity change, appetite change, chills, diaphoresis, fever and unexpected weight change.   HENT:  Positive for hearing loss and tinnitus. Negative for congestion, dental problem, drooling, ear discharge, ear pain, facial swelling, mouth sores, nosebleeds, postnasal drip, rhinorrhea, sinus pressure, sinus pain, sneezing, sore throat, trouble swallowing and voice change.    Eyes:  Negative for photophobia, pain, discharge, redness, itching and visual disturbance.   Respiratory:  Positive for shortness of breath and wheezing. Negative for apnea, cough, choking, chest tightness and stridor.    Cardiovascular:  Positive for palpitations. Negative for chest pain and leg swelling.   Gastrointestinal:  Positive for constipation. Negative for abdominal distention, abdominal pain, anal bleeding, blood in stool, diarrhea, nausea, rectal pain and vomiting.   Endocrine: Positive for cold intolerance. Negative for heat intolerance, polydipsia, polyphagia and polyuria.   Genitourinary:  Negative for decreased urine volume, difficulty urinating,  "dyspareunia, dysuria, enuresis, flank pain, frequency, genital sores, hematuria, menstrual problem, pelvic pain, urgency, vaginal bleeding, vaginal discharge and vaginal pain.   Musculoskeletal:  Positive for back pain and neck stiffness. Negative for arthralgias, gait problem, joint swelling, myalgias and neck pain.   Skin:  Negative for color change, pallor, rash and wound.   Allergic/Immunologic: Negative for environmental allergies, food allergies and immunocompromised state.   Neurological:  Positive for dizziness, weakness, light-headedness, numbness and headaches. Negative for tremors, seizures, syncope, facial asymmetry and speech difficulty.   Hematological:  Negative for adenopathy. Does not bruise/bleed easily.   Psychiatric/Behavioral:  Negative for agitation, behavioral problems, confusion, decreased concentration, dysphoric mood, hallucinations, self-injury, sleep disturbance and suicidal ideas. The patient is not nervous/anxious and is not hyperactive.      The patient's past medical history, past surgical history, family history, and social history have been reviewed at length in the electronic medical record.      Physical Exam:   Temp 96.9 øF (36.1 øC) (Infrared)   Ht 160 cm (63\")   Wt 54 kg (119 lb)   BMI 21.08 kg/mý   CONSTITUTIONAL: Patient is well-nourished, pleasant and appears stated age.  MUSCULOSKELETAL:  Straight leg raising is negative.  Jean's Sign is negative.  ROM in the low back is normal.  Tenderness in the back to palpation is not observed.  NEUROLOGICAL:  Orientation, memory, attention span, language function, and cognition have been examined and are intact.  Strength is intact in the lower extremities to direct testing.  Muscle tone is normal throughout.  Station and gait are normal.  Sensation is intact to light touch testing throughout.  Deep tendon reflexes are 1+ and symmetrical.  Coordination is intact.      Medical Decision Making    Data Review:   (All imaging studies " were personally reviewed unless stated otherwise)  MRI of the lumbar spine dated 7/21/2023 demonstrates extensive diffuse degenerative disc disease.  There is a grade 1 listhesis of L5 on S1.  There is marked facet and ligamentous overgrowth on the left at L3-4 that may well compromise the L4 nerve root.  There is broad-based disc bulging or protrusion that narrows the recess and foramen on the left at L4-5.  This could compromise the L4/L5 nerve roots.  Hemangiomas are noted within some of the vertebral bodies.    Diagnosis:   Right L4 radiculopathy, improved.    Treatment Options:   The patient will continue her physical therapy.  Given the severity of her symptoms I am not enthusiastic about a day long plane ride to [place].  I will provide a letter indicating that she may not travel due to her back difficulties.  She is ultimately going to decide in next couple of days as to whether to travel.  She will follow-up in our clinic in about 6 weeks to check on her progress.  If her symptoms persist or worsen then I would consider an epidural injection.       Diagnosis Plan   1. Lumbar radiculopathy        2. Spondylolisthesis of lumbar region        3. DDD (degenerative disc disease), lumbar            Scribed for Dallin Amos MD by Bridgett Clark KIP 8/22/2023 14:22 EDT      I, Dr. Amos, personally performed the services described in the documentation, as scribed in my presence, and it is both accurate and complete.

## 2023-10-06 ENCOUNTER — OFFICE VISIT (OUTPATIENT)
Dept: NEUROSURGERY | Facility: CLINIC | Age: 68
End: 2023-10-06
Payer: MEDICARE

## 2023-10-06 VITALS
TEMPERATURE: 97.5 F | DIASTOLIC BLOOD PRESSURE: 58 MMHG | HEIGHT: 63 IN | BODY MASS INDEX: 21.86 KG/M2 | WEIGHT: 123.4 LBS | SYSTOLIC BLOOD PRESSURE: 102 MMHG

## 2023-10-06 DIAGNOSIS — M54.16 LUMBAR RADICULOPATHY: Primary | ICD-10-CM

## 2023-10-06 RX ORDER — SCOLOPAMINE TRANSDERMAL SYSTEM 1 MG/1
PATCH, EXTENDED RELEASE TRANSDERMAL
COMMUNITY
Start: 2023-09-26

## 2023-10-06 NOTE — PROGRESS NOTES
Name: Kiesha Dempsey    : 1955     MRN: [id number]     Primary Care Provider: Tyler Rucker MD    Chief Complaint  Back Pain      History of Present Illness:  Kiesha Dempsey is a 68 y.o. female who presents to the clinic today for follow-up regarding low back and right leg pain.  She had been previously evaluated by Dr. Amos and was found to have a right L4 radiculopathy.  He recommended physical therapy and work follow-up in 6 weeks.  She returns today and states that her right leg pain has resolved.  She does have some intermittent low back pain.  She is been going to physical therapy 2 times a week and continues to walk about a mile daily.  She denies any new numbness, tingling, weakness, bowel or bladder issues.    PMHX  Allergies:  Allergies   Allergen Reactions    Bactrim [Sulfamethoxazole-Trimethoprim] Hives    Tramadol Shortness Of Breath, Nausea Only and Other (See Comments)     CHEST PAIN    Flecainide Other (See Comments)     Headaches       Medications    Current Outpatient Medications:     aspirin 81 MG EC tablet, Take 1 tablet by mouth Daily., Disp: , Rfl:     azithromycin (ZITHROMAX) 500 MG tablet, , Disp: , Rfl:     Eptinezumab-jjmr (Vyepti) 100 MG/ML solution solution, Infuse 1 mL into a venous catheter Every 3 (Three) Months., Disp: , Rfl:     Estriol 10 % cream, Insert 1 mL into the vagina Daily As Needed., Disp: , Rfl: 5    hydrOXYzine (ATARAX) 10 MG tablet, Take 1 tablet by mouth Daily., Disp: , Rfl:     mefloquine (LARIAM) 250 MG tablet, , Disp: , Rfl:     Rimegepant Sulfate (Nurtec) 75 MG tablet dispersible tablet, Take  by mouth As Needed., Disp: , Rfl:     Rimegepant Sulfate (Nurtec) 75 MG tablet dispersible tablet, Take 1 tablet by mouth., Disp: , Rfl:     Scopolamine 1 MG/3DAYS patch, APPLY 1 PATCH TOPICALLY EVERY 72 HOURS, Disp: , Rfl:   Past Medical History:  Past Medical History:   Diagnosis Date    Abnormal ECG 2017    Anesthesia     post anesthesia headahces      Atrial fibrillation     Colon polyp     DDD (degenerative disc disease), lumbar     Frequent UTI     none recently     Head concussion     Kidney infection     Migraines      Past Surgical History:  Past Surgical History:   Procedure Laterality Date    ABLATION OF DYSRHYTHMIC FOCUS  10/2019    CARDIAC ABLATION  10/31/2019    CARDIAC ELECTROPHYSIOLOGY PROCEDURE N/A 10/31/2019    Procedure: Ablation atrial fibrillation, hold metoprolol 3-5 days prior;  Surgeon: Bruce Rodriguez MD;  Location: [facility] INVASIVE LOCATION;  Service: Cardiovascular    COLONOSCOPY      HYSTERECTOMY  2006    bso    SKIN BIOPSY      TOTAL HIP ARTHROPLASTY Right 2015    VEIN SURGERY  2005     Social Hx:  Social History     Tobacco Use    Smoking status: Never    Smokeless tobacco: Never   Vaping Use    Vaping Use: Never used   Substance Use Topics    Alcohol use: No    Drug use: No     Family Hx:  Family History   Problem Relation Age of Onset    Heart attack Mother     Heart disease Mother         CABG    Hyperlipidemia Mother     Stroke Mother     Hypertension Mother     COPD Mother     Emphysema Mother     Asthma Mother     Cancer Mother         lung    Angina Father     Heart attack Father     Heart disease Father         CABG,VALVE REPLACEMENT    Arrhythmia Father         pacemaker    Mitral valve prolapse Father     Cancer Father         lung    Hyperlipidemia Father     Stroke Father     Hypertension Father     Sleep apnea Father     Narcolepsy Father     Hypersomnolence Father     Stroke Brother     Hypertension Brother      Review of Systems:        Review of Systems   Constitutional:  Negative for activity change, appetite change, chills, diaphoresis, fatigue, fever and unexpected weight change.   HENT:  Negative for congestion, dental problem, drooling, ear discharge, ear pain, facial swelling, hearing loss, mouth sores, nosebleeds, postnasal drip, rhinorrhea, sinus pressure, sinus pain, sneezing, sore throat, tinnitus, trouble  "swallowing and voice change.    Eyes:  Negative for photophobia, pain, discharge, redness, itching and visual disturbance.   Respiratory:  Negative for apnea, cough, choking, chest tightness, shortness of breath, wheezing and stridor.    Cardiovascular:  Negative for chest pain, palpitations and leg swelling.   Gastrointestinal:  Negative for abdominal distention, abdominal pain, anal bleeding, blood in stool, constipation, diarrhea, nausea, rectal pain and vomiting.   Endocrine: Negative for cold intolerance, heat intolerance, polydipsia, polyphagia and polyuria.   Genitourinary:  Negative for decreased urine volume, difficulty urinating, dyspareunia, dysuria, enuresis, flank pain, frequency, genital sores, hematuria, menstrual problem, pelvic pain, urgency, vaginal bleeding, vaginal discharge and vaginal pain.   Musculoskeletal:  Negative for arthralgias, back pain, gait problem, joint swelling, myalgias, neck pain and neck stiffness.   Skin:  Negative for color change, pallor, rash and wound.   Allergic/Immunologic: Negative for environmental allergies, food allergies and immunocompromised state.   Neurological:  Positive for weakness. Negative for dizziness, tremors, seizures, syncope, facial asymmetry, speech difficulty, light-headedness, numbness and headaches.   Hematological:  Negative for adenopathy. Does not bruise/bleed easily.   Psychiatric/Behavioral:  Negative for agitation, behavioral problems, confusion, decreased concentration, dysphoric mood, hallucinations, self-injury, sleep disturbance and suicidal ideas. The patient is not nervous/anxious and is not hyperactive.         Vital Signs: /58 (BP Location: Right arm, Patient Position: Sitting, Cuff Size: Adult)   Temp 97.5 °F (36.4 °C) (Infrared)   Ht 160 cm (63\")   Wt 56 kg (123 lb 6.4 oz)   BMI 21.86 kg/m²      Physical Exam  Awake, alert and oriented x 3  Speech f/c  Opens eyes spontaneously  Pupils 3 mm rx bilaterally  Extraocular " muscles intact bilaterally  Normal sensation to light touch in all 3 distributions of CN V bilaterally  Face symmetric bilaterally  Tongue midline  5/5 in all 4 extremities  Normal sensation to light touch in upper and lower extremities  Clonus is negative bilaterally.   Gait normal. Balance normal        Social History    Tobacco Use      Smoking status: Never      Smokeless tobacco: Never       Tobacco Use: Low Risk     Smoking Tobacco Use: Never    Smokeless Tobacco Use: Never    Passive Exposure: Not on file           STEADI Fall Risk Assessment was completed, and patient is at MODERATE risk for falls. Assessment completed on:10/6/2023      Diagnostic Studies: (All imaging is independently reviewed unless stated otherwise.)  MRI of the lumbar spine from 7/21/2023 reviewed which shows degenerative disc disease throughout her lumbar spine as well as a grade 1 listhesis of L5 on S1.  There is facet hypertrophy at L3-4 as well as a broad-based disc bulge causing left neuroforaminal stenosis at L4-5.      Assessment/Plan    Diagnoses and all orders for this visit:    1. Lumbar radiculopathy (Primary)  -     Ambulatory Referral to Physical Therapy Evaluate and treat; Stretching, ROM, Strengthening         This is a 68-year-old female who presents for follow-up regarding right L4 radiculopathy previously evaluated by Dr. Amos.  She reports her symptoms are much improved and is no longer having right leg pain.  She has intermittent low back pain.  I recommend she continue with physical therapy.  If she were to have any exacerbation or worsening of her radicular symptoms.  We could refer her to pain management for an injection.  We will have her follow-up in about 3 months to see how she is doing.  If she continues to do well we will have her follow-up on an as-needed basis at that point. Patient encouraged to contact us if she has any changes in her condition or any concerns.    Any copied data from previous notes  included in the (1) HPI, (2) PE, (3) MDM and/or Assessment and Plan has been reviewed and accurate as of 10/06/23.    Savannah Ziegler PA-C  10/06/23    I spent 20 minutes caring for Kiesha Dempsey on 10/06/23.  This time includes activities preparing for the visit, reviewing test, reviewing history and performing an appropriate examination.  Discussing with the patient and reviewing and independently interpreting the diagnostic studies, communicating that information to the patient along with discussing care coordination and referrals if needed and documenting information in the medical records.

## 2023-11-15 ENCOUNTER — OFFICE VISIT (OUTPATIENT)
Dept: ORTHOPEDIC SURGERY | Facility: CLINIC | Age: 68
End: 2023-11-15
Payer: MEDICARE

## 2023-11-15 VITALS
BODY MASS INDEX: 21.79 KG/M2 | DIASTOLIC BLOOD PRESSURE: 84 MMHG | HEIGHT: 63 IN | SYSTOLIC BLOOD PRESSURE: 120 MMHG | WEIGHT: 123 LBS

## 2023-11-15 DIAGNOSIS — S64.01XA: Primary | ICD-10-CM

## 2023-11-15 RX ORDER — AMOXICILLIN AND CLAVULANATE POTASSIUM 875; 125 MG/1; MG/1
1 TABLET, FILM COATED ORAL 2 TIMES DAILY
Qty: 14 TABLET | Refills: 0 | Status: SHIPPED | OUTPATIENT
Start: 2023-11-15

## 2023-11-15 NOTE — PROGRESS NOTES
Psychiatric Orthopedic     Office Visit       Date: 11/15/2023   Patient Name: Kiesha Dempsey  MRN: [id number]  YOB: 1955    Referring Physician: No ref. provider found     Chief Complaint:   Chief Complaint   Patient presents with    Right Hand - Pain       History of Present Illness:   Kiesha Dempsey is a 68 y.o. female right-hand-dominant presents with right hand pain as well as numbness in the small and ring finger after a dog bite on 11/11/2023.  Patient reports that she was bit by a border collie and sustained a significant laceration on the ulnar aspect of her hand.  She presented to [facility] where she reports avulsed nerve was identified at that time.  She reports that she has had numbness to the entirety of her small finger as well as part of her ring finger since the injury.  Reports some difficulty with motion but has been keeping her hand in a splint.  She is otherwise healthy.  She is right-handed.  She is retired.  She denies smoking.      Subjective   Review of Systems:   Review of Systems   Constitutional: Negative.  Negative for chills, fatigue and fever.   HENT: Negative.  Negative for congestion and dental problem.    Eyes: Negative.  Negative for blurred vision.   Respiratory: Negative.  Negative for shortness of breath.    Cardiovascular: Negative.  Negative for leg swelling.   Gastrointestinal: Negative.  Negative for abdominal pain.   Endocrine: Negative.  Negative for polyuria.   Genitourinary: Negative.  Negative for difficulty urinating.   Musculoskeletal:  Positive for arthralgias.   Skin: Negative.    Allergic/Immunologic: Negative.    Neurological: Negative.    Hematological: Negative.  Negative for adenopathy.   Psychiatric/Behavioral: Negative.  Negative for behavioral problems.         Pertinent review of systems per HPI.     I reviewed the patient's chief complaint, history of present  "illness, review of systems, past medical history, surgical history, family history, social history, medications and allergy list in the EMR on 11/15/2023 and agree with the findings above.    Objective    Vital Signs:   Vitals:    11/15/23 1436   BP: 120/84   Weight: 55.8 kg (123 lb)   Height: 160 cm (62.99\")     BMI: BMI is within normal parameters. No other follow-up for BMI required.       General Appearance: No acute distress. Alert and oriented.     Chest:  Non-labored breathing on room air. Regular rate and rhythm.    Right upper Extremity Exam:    3 cm laceration to the ulnar aspect of the right hand.  Patient has no sensation to light touch in the small finger and diminished along the ulnar aspect of the ring finger.  FDS and FDP to the small and ring finger grossly intact.  She is able to abduct her fingers and ulnar pinch is intact.    Sensation intact to light touch in median radial nerve distributions.    She has a small 1 cm laceration over the dorsal aspect of the second webspace.  Maceration but no purulent drainage.  Minimal tenderness over the second and third MCP.  Flexion of the MCP is intact.    No palpable masses or visible lesions  Fingers are warm, well-perfused with appropriate capillary refill.  Palpable radial pulse.    Non-tender except for in the areas highlighted below    Imaging/Studies:   Imaging Results (Last 24 Hours)       ** No results found for the last 24 hours. **            X-ray of the right hand from 11/11/2023 was independently reviewed without obvious fracture or bony abnormality.    Procedures:  Procedures    Quality Measures:   ACP:   ACP discussion was declined by the patient. Patient does not have an advance directive, declines further assistance.    Tobacco:   Kiesha PADRON Ke  reports that she has never smoked. She has never used smokeless tobacco..  Assessment / Plan    Assessment/Plan:     There are no diagnoses linked to this encounter.     Kiesha PADRON Keis a 68 " y.o. female who presents with:      ICD-10-CM ICD-9-CM   1. Laceration of right ulnar nerve at wrist and hand level, initial encounter  S64.01XA 955.2       Patient is status post dog bite to the right hand with obvious nerve deficits in her small and ring finger concerning for possible ulnar sensory injury.  There is concern that the nerve was avulsed at the initial injury.  Recommend exploration and repair of all injured structures and possible nerve graft reconstruction with allograft if indicated.  Her tendons appear to be intact on exam however if there are Significant injuries these may require repair as well.  Recommend patient continue Augmentin in the meantime and we will anticipate booking her for surgery sometime this week.    Consent right hand nerve repair versus reconstruction with allograft, all indicated procedures    The risks and benefits of the procedure were discussed with the patient and or appropriate guardian, which include but are not limited to the risk of bleeding, infection, neurovascular damage, post-operative stiffness, recurrence, tendon and/or ligament retears, recurrent instability, continued pain, arthritic pain, need for further revision surgeries in the future, deep venous thrombosis, and general risks from anesthesia. We also discussed the post-operative rehabilitation, the need for physical therapy, and the overall expected outcomes from the procedure. We also discussed the possible use of biologics including allograft. We allowed proper time and answered the patient's questions regarding the procedure. The patient expressed understanding. Knowing what the risks are and what the conservative treatment is, the patient elected to forgo any further conservative treatment options and proceed with the surgical intervention. A surgical consent was signed.      Follow Up:   Return for Follow Up after Post Op.        Hernan Reid MD  [facility] Hand and Upper Extremity Surgeon

## 2023-11-16 ENCOUNTER — TELEPHONE (OUTPATIENT)
Dept: ORTHOPEDIC SURGERY | Facility: CLINIC | Age: 68
End: 2023-11-16
Payer: MEDICARE

## 2023-11-16 NOTE — TELEPHONE ENCOUNTER
Ms. Dempsey was wanting to see if probiotics could be sent in for her as she has been prescribed antibiotics.

## 2023-11-17 ENCOUNTER — OUTSIDE FACILITY SERVICE (OUTPATIENT)
Dept: ORTHOPEDIC SURGERY | Facility: CLINIC | Age: 68
End: 2023-11-17
Payer: MEDICARE

## 2023-11-17 ENCOUNTER — DOCUMENTATION (OUTPATIENT)
Dept: ORTHOPEDIC SURGERY | Facility: CLINIC | Age: 68
End: 2023-11-17

## 2023-11-17 DIAGNOSIS — S64.01XA: Primary | ICD-10-CM

## 2023-11-17 RX ORDER — FLUCONAZOLE 150 MG/1
150 TABLET ORAL ONCE
Qty: 1 TABLET | Refills: 0 | Status: SHIPPED | OUTPATIENT
Start: 2023-11-17 | End: 2023-11-17

## 2023-11-17 RX ORDER — OXYCODONE HYDROCHLORIDE 5 MG/1
5 TABLET ORAL EVERY 6 HOURS PRN
Qty: 20 TABLET | Refills: 0 | Status: SHIPPED | OUTPATIENT
Start: 2023-11-17

## 2023-11-17 NOTE — PROGRESS NOTES
DATE OF PROCEDURE: 11/17/2023    LOCATION:  [facility]     PROCEDURES PERFORMED:    1. Right small finger ulnar digital nerve reconstruction with nerve allograft (3 cm) CPT 27602  2. Right small finger radial digital nerve neurolysis CPT 80050  3. Right hand intrinsic muscle repair CPT 16140    SURGEON: Hernan Reid MD      ASSISTANTS:    1. None        * Surgery not found *      ANESTHESIA: General    PREOPERATIVE DIAGNOSES:  1. Dog-bite to the right hand     POSTOPERATIVE DIAGNOSES:    Dog-bite to the right hand with ulnar digital nerve avulsion  Hypothenar muscle laceration     ESTIMATED BLOOD LOSS: 5 mL.    SPECIMENS: None    IMPLANTS: 1-2 mm diameter 30 mm length axogen avance nerve allograft    COMPLICATIONS: None     INDICATIONS:  Kiesha Dempsey is a 68 y.o. female who initially presented with dog bite to the right hand with paresthesias of her small and ring finger concerning for nerve injury.  The risks, benefits, alternatives and potential complications of surgery were discussed with the patient including but not limited to bleeding scarring, infection, recurrence, stiffness, damage to surrounding structures or postoperative pain.  The patient understands the risks and agreed to proceed with surgery.  A surgical informed consent was signed prior to the procedure.     DESCRIPTION OF PROCEDURE:  The patient was greeted in the pre-operative holding area and the surgical site was marked and consent confirmed prior to bringing the patient to the operating room.  The patient was then taken to the operating room and a timeout was performed including the patient's name, procedure and antibiotic administration prior to the patient receiving general anesthesia.  The patient was positioned supine on the operative room table and a non-sterile tourniquet was applied to the right upper extremity and it was then prepped and draped in the usual sterile fashion.  The patient received the  appropriate antibiotics within 1 hour of skin incision.    The right upper extremity was exsanguinated and tourniquet set to 250 mmHg.  The patient's laceration at the ulnar aspect of the hand was extended proximally to the wrist crease and distally into the small finger in Otis fashion.  The incision was made and the patient's prior wound was opened up.  A combination of sharp and blunt dissection was used to carefully elevate skin flaps from the underlying flexor sheath and digital neurovascular bundles.  The wound was then irrigated with copious amounts normal saline solution and debrided of any grossly nonviable tissue.  There was a large only 80% laceration of the hypothenar musculature.  This was repaired in figure-of-eight fashion using 3-0 Vicryl suture.  This comprised the right hand intrinsic muscle repair.    The FDS and FDP were then examined and found to be intact.  There was a rent in the flexor sheath just proximal to the A1 pulley however there is no injury to either flexor tendon.    Next attention was turned towards the radial digital nerve of the small finger.  This is identified distally in the finger and neurolysed proximally into his convergence with the common digital nerve to the fourth webspace.  The common digital nerve was further traced proximally into its takeoff from the ulnar nerve.  It was found to be intact from the entirety of its course.  It is comprised neurolysis of the radial digital nerve to the small finger.    Next the ulnar digital nerve to the small finger was identified significantly distal to the original wound below proximately the middle phalanx.  It had been avulsed just proximal to this area.  The the ulnar digital artery the small finger remained intact.  Proximally the nerve stump of the ulnar digital artery of the small finger was identified approximately 3 cm distal to its takeoff from the ulnar nerve.  The proximal and distal ends were then sharply debrided  back to healthy fascicles.  After debridement,  there was a 3 cm gap in the proximal and distal ends of the ulnar digital nerve to the small finger.  1-2 mm 30mm nerve allograft was selected to reconstruct the digital nerve.  Under loupe magnification the nerve was approximated proximally and distally using 9-0 nylon epineurial sutures in simple interrupted fashion.  The nerve repair was performed in such a way that the there was minimal tension at the repair site.    The tourniquet was then let down and the wound again irrigated with copious amounts of normal saline solution.  Hemostasis achieved with bipolar electrocautery. The incision was then closed with 4-0 nylon in a combination of simple interrupted and horizontal mattress fashion. An ulnar gutter splint was applied.    At the end of the procedure all instrument counts were correct.  The patient was awoken from anesthesia and transferred to the PACU in stable condition.  I participated in all parts of the case.

## 2023-11-26 ENCOUNTER — HEALTH MAINTENANCE LETTER (OUTPATIENT)
Age: 68
End: 2023-11-26

## 2023-11-29 ENCOUNTER — OFFICE VISIT (OUTPATIENT)
Dept: ORTHOPEDIC SURGERY | Facility: CLINIC | Age: 68
End: 2023-11-29
Payer: MEDICARE

## 2023-11-29 VITALS — TEMPERATURE: 97.9 F

## 2023-11-29 DIAGNOSIS — S64.01XA: Primary | ICD-10-CM

## 2023-11-29 PROCEDURE — 99024 POSTOP FOLLOW-UP VISIT: CPT | Performed by: PLASTIC SURGERY

## 2023-11-29 NOTE — PROGRESS NOTES
[facility] Orthopedic     Follow-up Office Visit       Date: 11/29/2023   Patient Name: Kiesha Dempsey  MRN: [id number]  YOB: 1955    Chief Complaint:   Chief Complaint   Patient presents with    Post-op     2 Weeks Status Post  small finger ulnar digital nerve reconstruction with nerve allograft , small finger radial digital nerve neurolysis, and intrinsic muscle repair -- Right Hand 11/17/2023         History of Present Illness:   Kiesha Dempsey is a 68 y.o. female presents for 2-week follow-up status post left small finger digital nerve reconstruction with allograft and left common digital nerve neurolysis to the ring and small finger as as well as left hypothenar muscle repair after dog bite.  She reports her pain has been well controlled since surgery.  She reports that her left small finger remains numb.  She reports that the paresthesias of her left ring finger have improved since surgery.  She otherwise has no concerns today.      Subjective   Review of Systems:   Review of Systems   Constitutional: Negative.  Negative for chills, fatigue and fever.   HENT: Negative.  Negative for congestion and dental problem.    Eyes: Negative.  Negative for blurred vision.   Respiratory: Negative.  Negative for shortness of breath.    Cardiovascular: Negative.  Negative for leg swelling.   Gastrointestinal: Negative.  Negative for abdominal pain.   Endocrine: Negative.  Negative for polyuria.   Genitourinary: Negative.  Negative for difficulty urinating.   Musculoskeletal:  Positive for arthralgias.   Skin: Negative.    Allergic/Immunologic: Negative.    Neurological: Negative.    Hematological: Negative.  Negative for adenopathy.   Psychiatric/Behavioral: Negative.  Negative for behavioral problems.         Pertinent review of systems per HPI    I reviewed the patient's chief complaint, history of present illness, review of systems,  past medical history, surgical history, family history, social history, medications and allergy list in the EMR on 11/29/2023 and agree with the findings above.    Objective    Vital Signs:   Vitals:    11/29/23 0940   Temp: 97.9 °F (36.6 °C)   TempSrc: Temporal     BMI: BMI is within normal parameters. No other follow-up for BMI required.       General Appearance: No acute distress. Alert and oriented.     Chest:  Non-labored breathing on room air      Left upper Extremity:  Incision clear dry intact and healing appropriately.  No sensation to light touch at the left small finger.  Sensation intact to light touch at the left ring finger along the radial and ulnar sides.  Range of motion limited secondary to pain and stiffness of the small and ring fingers.  Fingers warm and well-perfused distally  Sensation intact to light touch in the median, radian and ulnar nerve distributions    Imaging/Studies:   Imaging Results (Last 24 Hours)       ** No results found for the last 24 hours. **            No new imaging reviewed.    Procedures:  Procedures    Quality Measures:   ACP:   ACP discussion was declined by the patient. Patient does not have an advance directive, declines further assistance.    Tobacco:   Kieshasalo Dempsey  reports that she has never smoked. She has never been exposed to tobacco smoke. She has never used smokeless tobacco..  Assessment / Plan    Assessment/Plan:      Diagnosis Plan   1. Laceration of right ulnar nerve at wrist and hand level, initial encounter  Ambulatory Referral to Physical Therapy Evaluate and treat, Ortho        Patient is 2 weeks postop left small finger digital nerve reconstruction with allograft as well as neurolysis of the common digital nerve to the ring and small finger which was found to be intact.  She also underwent left hypothenar muscle repair.  I went over the pathophysiology of nerve injuries with the patient including the expectation of very slow and unpredictable  recovery for a nerve reconstruction of a 3 cm gap.  Nerve recovers approximately 1 mm a day so it may be months before she experiences any sensory recovery on the ulnar aspect of her left small finger.  Regarding the radial aspect, her digital nerve was found and intact so I am much more optimistic for her to have some recovery of protective sensation of the small finger.  Recommend hand therapy to work on active and passive range of motion of the left hand, discontinuing her splint and follow-up in 4 weeks for range of motion check.    Follow Up:   Return in about 4 weeks (around 12/27/2023).        Hernan Reid MD  [facility] [place] Hand and Upper Extremity Surgeon

## 2023-12-01 ENCOUNTER — TRANSFERRED RECORDS (OUTPATIENT)
Dept: HEALTH INFORMATION MANAGEMENT | Facility: CLINIC | Age: 68
End: 2023-12-01
Payer: COMMERCIAL

## 2023-12-07 ENCOUNTER — TELEPHONE (OUTPATIENT)
Dept: ORTHOPEDIC SURGERY | Facility: CLINIC | Age: 68
End: 2023-12-07
Payer: MEDICARE

## 2023-12-07 NOTE — TELEPHONE ENCOUNTER
Patient calling in today with regards to her right hand nerve graph surgery. She has general question about wound care and how to proceed.

## 2024-01-08 ENCOUNTER — ANCILLARY PROCEDURE (OUTPATIENT)
Dept: MAMMOGRAPHY | Facility: CLINIC | Age: 69
End: 2024-01-08
Attending: INTERNAL MEDICINE
Payer: COMMERCIAL

## 2024-01-08 DIAGNOSIS — Z12.31 VISIT FOR SCREENING MAMMOGRAM: ICD-10-CM

## 2024-01-08 PROCEDURE — 77063 BREAST TOMOSYNTHESIS BI: CPT | Mod: TC | Performed by: RADIOLOGY

## 2024-01-08 PROCEDURE — 77067 SCR MAMMO BI INCL CAD: CPT | Mod: TC | Performed by: RADIOLOGY

## 2024-01-10 ENCOUNTER — OFFICE VISIT (OUTPATIENT)
Dept: ORTHOPEDIC SURGERY | Facility: CLINIC | Age: 69
End: 2024-01-10
Payer: MEDICARE

## 2024-01-10 DIAGNOSIS — M65.341 TRIGGER RING FINGER OF RIGHT HAND: ICD-10-CM

## 2024-01-10 DIAGNOSIS — S64.01XA: Primary | ICD-10-CM

## 2024-01-10 RX ORDER — TRIAMCINOLONE ACETONIDE 40 MG/ML
20 INJECTION, SUSPENSION INTRA-ARTICULAR; INTRAMUSCULAR
Status: COMPLETED | OUTPATIENT
Start: 2024-01-10 | End: 2024-01-10

## 2024-01-10 RX ORDER — LIDOCAINE HYDROCHLORIDE 10 MG/ML
0.5 INJECTION, SOLUTION EPIDURAL; INFILTRATION; INTRACAUDAL; PERINEURAL
Status: COMPLETED | OUTPATIENT
Start: 2024-01-10 | End: 2024-01-10

## 2024-01-10 RX ADMIN — LIDOCAINE HYDROCHLORIDE 0.5 ML: 10 INJECTION, SOLUTION EPIDURAL; INFILTRATION; INTRACAUDAL; PERINEURAL at 10:16

## 2024-01-10 RX ADMIN — TRIAMCINOLONE ACETONIDE 20 MG: 40 INJECTION, SUSPENSION INTRA-ARTICULAR; INTRAMUSCULAR at 10:16

## 2024-01-10 NOTE — PROGRESS NOTES
[facility]     Follow-up Office Visit       Date: 01/10/2024   Patient Name: Kiesha Dempsey  MRN: [id number]  YOB: 1955    Chief Complaint:   Chief Complaint   Patient presents with    Post-op     6 week follow up -- 8 weeks status post small finger ulnar digital nerve reconstruction with nerve allograft , small finger radial digital nerve neurolysis, and intrinsic muscle repair -- Right Hand 11/17/2023       History of Present Illness:   Kiesha Dempsey is a 68 y.o. female presents for 2-month follow-up status post ulnar digital nerve reconstruction with right small finger ulnar digital nerve reconstruction with allograft, radial digital nerve neurolysis and intrinsic muscle repair on 11/17/2023.  She reports that she has been doing well since her last visit and her hand wound has entirely healed.  She reports that she does have sensation along the radial aspect of her right small finger.  She continues to have numbness and tingling along the ulnar aspect of her right small finger.  She also noticed intermittent numbness and tingling in her right small and ring fingers with overuse of the right hand as well as occasional nocturnal symptoms with numbness and tingling in her entire hand.  She also complains of pain at the base of the right ring finger without catching or locking.       Subjective   Review of Systems:   Review of Systems   Constitutional: Negative.    HENT: Negative.     Eyes: Negative.    Respiratory: Negative.     Cardiovascular: Negative.    Gastrointestinal: Negative.    Endocrine: Negative.    Genitourinary: Negative.    Musculoskeletal:  Positive for arthralgias.   Skin: Negative.    Allergic/Immunologic: Negative.    Neurological: Negative.    Hematological: Negative.    Psychiatric/Behavioral: Negative.          Pertinent review of systems per HPI    I reviewed the patient's chief complaint,  history of present illness, review of systems, past medical history, surgical history, family history, social history, medications and allergy list in the EMR on 01/10/2024 and agree with the findings above.    Objective    Vital Signs: There were no vitals filed for this visit.  BMI: BMI is within normal parameters. No other follow-up for BMI required.       General Appearance: No acute distress. Alert and oriented.     Chest:  Non-labored breathing on room air      Right upper Extremity:  Right ulnar wrist incision well-healed with some evidence of hypertrophic scarring.  She has sensation intact to light touch in the median and ulnar nerve distribution except for at the ulnar aspect of the right small finger.  There is a positive Tinel along the small finger ulnar neurovascular bundle right at the proximal digital crease.  She does have a positive Tinel at the ulnar nerve at the wrist as well as positive Tinel at the median nerve at the wrist.  Positive carpal compression test.  She is also tender to palpation over the A1 pulley of the right ring finger with a small palpable nodule.  No catching or locking of the finger.  Fingers warm and well-perfused distally  Sensation intact to light touch in the median, radian and ulnar nerve distributions    Imaging/Studies:   Imaging Results (Last 24 Hours)       ** No results found for the last 24 hours. **            No new imaging today.    Procedures:  Procedures    Quality Measures:   ACP:   ACP discussion was declined by the patient. Patient does not have an advance directive, declines further assistance.    Tobacco:   Kiesha PADRON Ke  reports that she has never smoked. She has never been exposed to tobacco smoke. She has never used smokeless tobacco..     Assessment / Plan    Assessment/Plan:      Diagnosis Plan   1. Laceration of right ulnar nerve at wrist and hand level, initial encounter        2. Trigger ring finger of right hand            Patient is now  2-month status post right ulnar digital nerve reconstruction with allograft.  She has recovery of sensation along her radial aspect of her small finger and she does have a progressing Tinel of her right small finger consistent with potential healing of her right ulnar digital nerve.  Her scar is also well-healed and continues to mature.  She does have some ulnar nerve sensitivity at the wrist as well as some carpal tunnel symptoms that may be related to swelling in her right hand.  Recommend nighttime bracing for the right carpal tunnel syndrome and ongoing follow-up.  If she continues to have both right carpal tunnel and right ulnar nerve sensitivity at the wrist she may benefit from right carpal tunnel release and complete Guyon's release with neurolysis of the ulnar nerve however given her ongoing scar maturation will hold off with conservative therapy at this time.  Recommend continue hand therapy as well as scar edema management.    She also has pain at the A1 pulley of the right ring finger consistent with trigger finger.  Discussed the pathophysiology and treatment options for trigger finger with the patient.  She would to undergo right ring finger trigger injection at this time.    Procedure Note:    I discussed with the patient the potential benefits of performing therapeutic aspiration and injections as well as potential risks including but not limited to infection, swelling, pain, bleeding, bruising, nerve/vessel damage, skin color changes, transient elevation in blood glucose levels, and fat atrophy. After informed consent and after the areas were prepped with chlorhexadine soap, ethyl chloride was used to numb the skin. 0.5 mL of 1% lidocaine was injected followed by injection of 20mg of Kenalog into the A1 pulley of the right ring finger.  The patient tolerated the procedure well. There were no complications. A sterile dressing was placed over the injection sites.      Follow Up:   Return in about 3  months (around 4/10/2024).        Hernan Reid MD  [facility] [facility]

## 2024-01-10 NOTE — PROGRESS NOTES
Procedure   - Hand/Upper Extremity Injection: R ring A1 for trigger finger on 1/10/2024 10:16 AM  Indications: pain  Details: 27 G needle, volar approach  Medications: 20 mg triamcinolone acetonide 40 MG/ML; 0.5 mL lidocaine PF 1% 1 %  Outcome: tolerated well, no immediate complications  Procedure, treatment alternatives, risks and benefits explained, specific risks discussed. Consent was given by the patient. Immediately prior to procedure a time out was called to verify the correct patient, procedure, equipment, support staff and site/side marked as required. Patient was prepped and draped in the usual sterile fashion.

## 2024-01-16 ENCOUNTER — OFFICE VISIT (OUTPATIENT)
Dept: NEUROSURGERY | Facility: CLINIC | Age: 69
End: 2024-01-16
Payer: MEDICARE

## 2024-01-16 VITALS
OXYGEN SATURATION: 98 % | WEIGHT: 122 LBS | BODY MASS INDEX: 20.83 KG/M2 | DIASTOLIC BLOOD PRESSURE: 74 MMHG | SYSTOLIC BLOOD PRESSURE: 108 MMHG | HEART RATE: 58 BPM | TEMPERATURE: 96.6 F | HEIGHT: 64 IN

## 2024-01-16 DIAGNOSIS — M51.36 DDD (DEGENERATIVE DISC DISEASE), LUMBAR: Primary | ICD-10-CM

## 2024-01-16 PROCEDURE — 99213 OFFICE O/P EST LOW 20 MIN: CPT

## 2024-01-16 PROCEDURE — 1160F RVW MEDS BY RX/DR IN RCRD: CPT

## 2024-01-16 PROCEDURE — 1159F MED LIST DOCD IN RCRD: CPT

## 2024-01-16 NOTE — PROGRESS NOTES
Name: Kiesha Dempsey    : 1955     MRN: [id number]     Primary Care Provider: Tyler Rucker MD    Chief Complaint  Back Pain      History of Present Illness:  Kiesha Dempsey is a 68 y.o. female who presents to the clinic today for follow-up.  Patient was previously seen for back pain and right leg pain and subsequently sent to physical therapy.  Patient states physical therapy helped her not and she is no longer having leg pain.  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  No RACHEAL symptoms.    PMHX  Allergies:  Allergies   Allergen Reactions    Bactrim [Sulfamethoxazole-Trimethoprim] Hives    Tramadol Shortness Of Breath, Nausea Only and Other (See Comments)     CHEST PAIN    Flecainide Other (See Comments)     Headaches       Medications    Current Outpatient Medications:     aspirin 81 MG EC tablet, Take 1 tablet by mouth Daily., Disp: , Rfl:     Eptinezumab-jjmr (Vyepti) 100 MG/ML solution solution, Infuse 1 mL into a venous catheter Every 3 (Three) Months., Disp: , Rfl:     Estriol 10 % cream, Insert 1 mL into the vagina Daily As Needed. (Patient not taking: Reported on 2024), Disp: , Rfl: 5    hydrOXYzine (ATARAX) 10 MG tablet, Take 1 tablet by mouth Daily., Disp: , Rfl:     mefloquine (LARIAM) 250 MG tablet, , Disp: , Rfl:     Rimegepant Sulfate (Nurtec) 75 MG tablet dispersible tablet, Take  by mouth As Needed., Disp: , Rfl:     Scopolamine 1 MG/3DAYS patch, APPLY 1 PATCH TOPICALLY EVERY 72 HOURS (Patient not taking: Reported on 2024), Disp: , Rfl:   Past Medical History:  Past Medical History:   Diagnosis Date    Abnormal ECG 2017    Anesthesia     post anesthesia headahces     Atrial fibrillation     Colon polyp     DDD (degenerative disc disease), lumbar     Frequent UTI     none recently     Head concussion     Kidney infection     Migraines     Murmur,  cardiac 02/08/2018    VICKY (obstructive sleep apnea) 12/27/2019     Past Surgical History:  Past Surgical History:   Procedure Laterality Date    ABLATION OF DYSRHYTHMIC FOCUS  10/2019    CARDIAC ABLATION  10/31/2019    CARDIAC ELECTROPHYSIOLOGY PROCEDURE N/A 10/31/2019    Procedure: Ablation atrial fibrillation, hold metoprolol 3-5 days prior;  Surgeon: Bruce Rodriguez MD;  Location: [facility] INVASIVE LOCATION;  Service: Cardiovascular    COLONOSCOPY      HAND SURGERY Right 11/17/2023    small finger ulnar digital nerve reconstruction with nerve allograft , small finger radial digital nerve neurolysis, and intrinsic muscle repair -- Dr. Kristina Reid    HYSTERECTOMY  2006    bso    SKIN BIOPSY      TOTAL HIP ARTHROPLASTY Right 2015    VEIN SURGERY  2005     Social Hx:  Social History     Tobacco Use    Smoking status: Never     Passive exposure: Never    Smokeless tobacco: Never   Vaping Use    Vaping Use: Never used   Substance Use Topics    Alcohol use: No    Drug use: No     Family Hx:  Family History   Problem Relation Age of Onset    Heart attack Mother     Heart disease Mother         CABG    Hyperlipidemia Mother     Stroke Mother     Hypertension Mother     COPD Mother     Emphysema Mother     Asthma Mother     Cancer Mother         lung    Angina Father     Heart attack Father     Heart disease Father         CABG,VALVE REPLACEMENT    Arrhythmia Father         pacemaker    Mitral valve prolapse Father     Cancer Father         lung    Hyperlipidemia Father     Stroke Father     Hypertension Father     Sleep apnea Father     Narcolepsy Father     Hypersomnolence Father     Stroke Brother     Hypertension Brother      Review of Systems:        Review of Systems   HENT:  Positive for hearing loss and tinnitus.    Cardiovascular:  Positive for palpitations.   Musculoskeletal:  Positive for neck pain.   Neurological:  Positive for light-headedness and headaches.          Vital Signs: /74 (BP Location:  "Left arm, Patient Position: Sitting, Cuff Size: Adult)   Pulse 58   Temp 96.6 °F (35.9 °C) (Temporal)   Ht 162.6 cm (64\")   Wt 55.3 kg (122 lb)   SpO2 98%   BMI 20.94 kg/m²      Physical Exam  Awake, alert and oriented x 3  Speech f/c  Opens eyes spontaneously  Pupils 3 mm rx bilaterally  Extraocular muscles intact bilaterally  Face symmetric bilaterally  Tongue midline  5/5 in BLE  Gait normal      Social History    Tobacco Use      Smoking status: Never      Smokeless tobacco: Never       Tobacco Use: Low Risk  (1/16/2024)    Patient History     Smoking Tobacco Use: Never     Smokeless Tobacco Use: Never     Passive Exposure: Never         STEADI Fall Risk Assessment has not been completed.      Diagnostic Studies: (All imaging is independently reviewed unless stated otherwise.)      Assessment/Plan    Diagnoses and all orders for this visit:    1. DDD (degenerative disc disease), lumbar (Primary)  -     Ambulatory Referral to Physical Therapy Evaluate and treat; Electrotherapy; Soft Tissue Mobilizaton         This is a 68-year-old female who presents for follow-up regarding back pain and leg pain.  Most of her symptoms have greatly improved with physical therapy.  She does have intermittent back pain that is controlled with traction and e-stim.  I have given her a new referral for PT for this. I have also placed an order for ESTIM that patient can use at home since it is beneficial for long term back pain control. Patient aware if she continues to have back pain that is no longer controlled with physical therapy measures, I can give her referral for pain management injections in her back.  Patient also aware if she develops any new or worsening leg pain that she can call the office and I be happy to see her again.  Patient follow-up on an as-needed basis. Patient encouraged to contact us if she has any changes in her condition or any concerns.    Any copied data from previous notes included in the (1) HPI, " (2) PE, (3) MDM and/or Assessment and Plan has been reviewed and accurate as of 01/16/24.    Savannah Ziegler PA-C  01/16/24    I spent 20 minutes caring for Kiesha Dempsey on 01/16/24.  This time includes activities preparing for the visit, reviewing test, reviewing history and performing an appropriate examination.  Discussing with the patient and reviewing and independently interpreting the diagnostic studies, communicating that information to the patient along with discussing care coordination and referrals if needed and documenting information in the medical records.

## 2024-01-29 DIAGNOSIS — F41.1 GAD (GENERALIZED ANXIETY DISORDER): ICD-10-CM

## 2024-01-29 RX ORDER — LORAZEPAM 0.5 MG/1
TABLET ORAL
Qty: 30 TABLET | Refills: 0 | Status: SHIPPED | OUTPATIENT
Start: 2024-01-29

## 2024-01-31 ENCOUNTER — OFFICE VISIT (OUTPATIENT)
Dept: CARDIOLOGY | Facility: CLINIC | Age: 69
End: 2024-01-31
Payer: MEDICARE

## 2024-01-31 VITALS
BODY MASS INDEX: 21.07 KG/M2 | SYSTOLIC BLOOD PRESSURE: 112 MMHG | WEIGHT: 123.4 LBS | OXYGEN SATURATION: 99 % | HEIGHT: 64 IN | DIASTOLIC BLOOD PRESSURE: 70 MMHG | HEART RATE: 60 BPM

## 2024-01-31 DIAGNOSIS — I49.1 PREMATURE ATRIAL CONTRACTIONS: ICD-10-CM

## 2024-01-31 DIAGNOSIS — I48.0 PAROXYSMAL ATRIAL FIBRILLATION: Primary | ICD-10-CM

## 2024-01-31 DIAGNOSIS — I49.5 TACHY-BRADY SYNDROME: ICD-10-CM

## 2024-01-31 PROCEDURE — 93000 ELECTROCARDIOGRAM COMPLETE: CPT | Performed by: PHYSICIAN ASSISTANT

## 2024-01-31 PROCEDURE — 99214 OFFICE O/P EST MOD 30 MIN: CPT | Performed by: PHYSICIAN ASSISTANT

## 2024-01-31 RX ORDER — PANTOPRAZOLE SODIUM 40 MG/1
1 TABLET, DELAYED RELEASE ORAL AS NEEDED
COMMUNITY
Start: 2024-01-15

## 2024-01-31 NOTE — PROGRESS NOTES
Kiesha PADRON Ke  1955  [phone]      [facility] CARDIOLOGY       Tyler Rucker MD  [address] [place] [address]    Chief Complaint   Patient presents with    Paroxysmal atrial fibrillation       Problem List:      Paroxysmal Atrial Fibrillation  CHADSVasc = 2 on Eliquis  24 Hour Holter 3/2018: no atrial fibrillation.   Diagnosed at PCP by EKG with symptoms of palpitations 11/15/18  Treated with metoprolol - caused low BP and low HR  Flecainide started 12/2018 - intolerant due to HA  Echocardiogram 2/15/18: EF 60%, mild TR  Stress Test 4/9/18: EF 69%, no ischemia. Low risk study.   Admit to [facility] ER for Atypical chest pain, Negative markers. Normal EKG Sinus Bradycardia.  PVA with cryoablation 10/31/19  Echocardiogram 2/12/2020: EF 58%, no significant structural or functional valvular disease  Stress Echocardiogram 2/24/2021: EF 58%, no EKG evidence of ischemia, Normal stress echo with no significant echocardiographic evidence for myocardial ischemia.  Event Monitor 11/3-12/4/2021: No atrial fibrillation, occasional PACs and paroxysmal atrial tachycardia  Frequent UTIs  Migraines  Colon polyps  Chronic nausea  Surgical History  Hyterectomy  Total hip arthroplasty  Vein surgery    Allergies  Allergies   Allergen Reactions    Bactrim [Sulfamethoxazole-Trimethoprim] Hives    Tramadol Shortness Of Breath, Nausea Only and Other (See Comments)     CHEST PAIN    Flecainide Other (See Comments)     Headaches         Current Medications    Current Outpatient Medications:     aspirin 81 MG EC tablet, Take 1 tablet by mouth Daily., Disp: , Rfl:     Eptinezumab-jjmr (Vyepti) 100 MG/ML solution solution, Infuse 1 mL into a venous catheter Every 3 (Three) Months., Disp: , Rfl:     hydrOXYzine (ATARAX) 10 MG tablet, Take 1 tablet by mouth Daily., Disp: , Rfl:     pantoprazole (PROTONIX) 40 MG EC tablet, Take 1 tablet by mouth As Needed., Disp: , Rfl:     Rimegepant Sulfate (Nurtec) 75 MG  "tablet dispersible tablet, Take  by mouth As Needed., Disp: , Rfl:     Estriol 10 % cream, Insert 1 mL into the vagina Daily As Needed. (Patient not taking: Reported on 1/16/2024), Disp: , Rfl: 5    mefloquine (LARIAM) 250 MG tablet, , Disp: , Rfl:     Scopolamine 1 MG/3DAYS patch, APPLY 1 PATCH TOPICALLY EVERY 72 HOURS (Patient not taking: Reported on 1/16/2024), Disp: , Rfl:     History of Present Illness     Pt presents for follow up of atrial fibrillation, PVCs, PACs. She is s/p remote PVA(Cryo) 2019. She was last seen by Dr. Rodriguez 1/11/2023. Since we last saw the pt, she has had one episode of afib last year year lasting about 10minutes. She has \"skipped\" beat that happens almost on a daily basis usually at night. No sustained episodes.  She denies any  SOB, CP, LH, and dizziness. Denies any hospitalizations, ER visits, bleeding, or TIA/CVA symptoms. 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     ROS:  General:  + fatigue, No weight gain or loss  Cardiovascular:  Denies CP, PND, syncope, near syncope, edema or palpitations.  Pulmonary:  Denies LEON, cough, or wheezing      Vitals:    01/31/24 1402   BP: 112/70   BP Location: Right arm   Patient Position: Sitting   Cuff Size: Adult   Pulse: 60   SpO2: 99%   Weight: 56 kg (123 lb 6.4 oz)   Height: 162.6 cm (64\")       Body mass index is 21.18 kg/m².  PE:  General: NAD  Neck: no JVD, no carotid bruits, no TM  Heart RRR, NL S1, S2, S4 present, no rubs, murmurs  Lungs: CTA, no wheezes, rhonchi, or rales  Abd: soft, non-tender, NL BS  Ext: No musculoskeletal deformities, no edema, cyanosis, or clubbing  Psych: normal mood and affect    Diagnostic Data:        ECG 12 Lead    Date/Time: 1/31/2024 2:24 PM  Performed by: Hernan Holland PA    Authorized by: Hernan Holland PA  Comparison: compared with previous ECG from 1/11/2023  Similar to previous ECG  Rhythm: sinus rhythm  Rate: normal  Conduction: conduction normal  ST Segments: " ST segments normal  QRS axis: normal    Clinical impression: normal ECG          1. Paroxysmal atrial fibrillation    2. Premature atrial contractions    3. Tachy-clementina syndrome            Plan:  1. PAF:  - s/p cryo ablation 10/2019  - no recurrence off AAD on recent event monitor  - CHADSvasc = 1 (age) on ASA, She has Eliquis as needed.         2. PACs/PVCs:   -rarely seen on monitor  - monitor for now, overall not limiting to daily activity    3. Marginal SBP, Hydration fluids.   F/up in 12 months  Electronically signed by REINA Salazar, 01/31/24, 2:24 PM EST.

## 2024-03-12 ENCOUNTER — OFFICE VISIT (OUTPATIENT)
Dept: INTERNAL MEDICINE | Facility: CLINIC | Age: 69
End: 2024-03-12
Payer: COMMERCIAL

## 2024-03-12 VITALS
DIASTOLIC BLOOD PRESSURE: 78 MMHG | TEMPERATURE: 97.8 F | BODY MASS INDEX: 23.16 KG/M2 | SYSTOLIC BLOOD PRESSURE: 132 MMHG | HEIGHT: 65 IN | HEART RATE: 84 BPM | RESPIRATION RATE: 14 BRPM | OXYGEN SATURATION: 98 % | WEIGHT: 139 LBS

## 2024-03-12 DIAGNOSIS — Z13.220 LIPID SCREENING: ICD-10-CM

## 2024-03-12 DIAGNOSIS — F41.9 ANXIETY: ICD-10-CM

## 2024-03-12 DIAGNOSIS — Z78.0 POSTMENOPAUSAL STATUS: ICD-10-CM

## 2024-03-12 DIAGNOSIS — Z00.00 ENCOUNTER FOR MEDICARE ANNUAL WELLNESS EXAM: Primary | ICD-10-CM

## 2024-03-12 DIAGNOSIS — Z00.00 HEALTHCARE MAINTENANCE: ICD-10-CM

## 2024-03-12 LAB
ALBUMIN UR-MCNC: NEGATIVE MG/DL
APPEARANCE UR: CLEAR
BILIRUB UR QL STRIP: NEGATIVE
COLOR UR AUTO: YELLOW
ERYTHROCYTE [DISTWIDTH] IN BLOOD BY AUTOMATED COUNT: 11.9 % (ref 10–15)
GLUCOSE UR STRIP-MCNC: NEGATIVE MG/DL
HCT VFR BLD AUTO: 39.2 % (ref 35–47)
HGB BLD-MCNC: 13.3 G/DL (ref 11.7–15.7)
HGB UR QL STRIP: NEGATIVE
KETONES UR STRIP-MCNC: 80 MG/DL
LEUKOCYTE ESTERASE UR QL STRIP: NEGATIVE
MCH RBC QN AUTO: 31 PG (ref 26.5–33)
MCHC RBC AUTO-ENTMCNC: 33.9 G/DL (ref 31.5–36.5)
MCV RBC AUTO: 91 FL (ref 78–100)
NITRATE UR QL: NEGATIVE
PH UR STRIP: 5.5 [PH] (ref 5–8)
PLATELET # BLD AUTO: 254 10E3/UL (ref 150–450)
RBC # BLD AUTO: 4.29 10E6/UL (ref 3.8–5.2)
SP GR UR STRIP: >=1.03 (ref 1–1.03)
UROBILINOGEN UR STRIP-ACNC: 0.2 E.U./DL
WBC # BLD AUTO: 4.9 10E3/UL (ref 4–11)

## 2024-03-12 PROCEDURE — 36415 COLL VENOUS BLD VENIPUNCTURE: CPT | Performed by: INTERNAL MEDICINE

## 2024-03-12 PROCEDURE — 80053 COMPREHEN METABOLIC PANEL: CPT | Performed by: INTERNAL MEDICINE

## 2024-03-12 PROCEDURE — 81003 URINALYSIS AUTO W/O SCOPE: CPT | Performed by: INTERNAL MEDICINE

## 2024-03-12 PROCEDURE — G0439 PPPS, SUBSEQ VISIT: HCPCS | Performed by: INTERNAL MEDICINE

## 2024-03-12 PROCEDURE — 99213 OFFICE O/P EST LOW 20 MIN: CPT | Mod: 25 | Performed by: INTERNAL MEDICINE

## 2024-03-12 PROCEDURE — 85027 COMPLETE CBC AUTOMATED: CPT | Performed by: INTERNAL MEDICINE

## 2024-03-12 PROCEDURE — 80061 LIPID PANEL: CPT | Mod: GZ | Performed by: INTERNAL MEDICINE

## 2024-03-12 PROCEDURE — 83036 HEMOGLOBIN GLYCOSYLATED A1C: CPT | Performed by: INTERNAL MEDICINE

## 2024-03-12 RX ORDER — RESPIRATORY SYNCYTIAL VIRUS VACCINE 120MCG/0.5
0.5 KIT INTRAMUSCULAR ONCE
Qty: 1 EACH | Refills: 0 | Status: CANCELLED | OUTPATIENT
Start: 2024-03-12 | End: 2024-03-12

## 2024-03-12 RX ORDER — ZOLPIDEM TARTRATE 5 MG/1
1 TABLET ORAL DAILY PRN
COMMUNITY

## 2024-03-12 RX ORDER — CLOBETASOL PROPIONATE 0.5 MG/G
OINTMENT TOPICAL 2 TIMES DAILY
COMMUNITY
Start: 2023-08-25

## 2024-03-12 SDOH — HEALTH STABILITY: PHYSICAL HEALTH: ON AVERAGE, HOW MANY DAYS PER WEEK DO YOU ENGAGE IN MODERATE TO STRENUOUS EXERCISE (LIKE A BRISK WALK)?: 6 DAYS

## 2024-03-12 ASSESSMENT — SOCIAL DETERMINANTS OF HEALTH (SDOH): HOW OFTEN DO YOU GET TOGETHER WITH FRIENDS OR RELATIVES?: TWICE A WEEK

## 2024-03-12 NOTE — PROGRESS NOTES
Preventive Care Visit  [facility]  Richar Rodas MD, Internal Medicine  Mar 12, 2024    Assessment & Plan     Encounter for Medicare annual wellness exam  Immunizations are reviewed and recommending getting a Td booster along with Shingrix and RSV completed.  She has a living will.  Non-smoker.  Uses alcohol in moderation.  Regular exercise and good diet habits to maintain a healthy weight discussed.  Up to date with colonoscopies and this should be repeated in 2030.  Recommending annual mammogram for breast cancer screening.   Recommending DEXA for osteoporosis screening.  Dementia and depression screening completed.  Recommending annual eye exam.  Recommending seeing a dentist every 6 months.  Skin exam performed and recommending regular use of sunblock.  We discussed seeing a dermatologist every 1 to 2 years.  Hepatitis C antibody for screening was normal.  Will screen for diabetes with fasting glucose.  Checking fasting lipid profile.      We discussed obtaining a CT coronary calcium score to help assess her cardiovascular risk.  She can call [facility] and phone number provided.    Anxiety  She will use lorazepam infrequently to help manage anxiety    Postmenopausal status  Ordering DEXA  - DEXA HIP/PELVIS/SPINE - Future; Future    Healthcare maintenance    - CBC with platelets; Future  - Comprehensive metabolic panel (BMP + Alb, Alk Phos, ALT, AST, Total. Bili, TP); Future  - Lipid Profile (Chol, Trig, HDL, LDL calc); Future  - UA Macroscopic with reflex to Microscopic and Culture - Lab Collect; Future  - CBC with platelets  - Comprehensive metabolic panel (BMP + Alb, Alk Phos, ALT, AST, Total. Bili, TP)  - Lipid Profile (Chol, Trig, HDL, LDL calc)  - UA Macroscopic with reflex to Microscopic and Culture - Lab Collect    Lipid screening    - Lipid Profile (Chol, Trig, HDL, LDL calc); Future  - Lipid Profile (Chol, Trig, HDL, LDL calc)    Patient has been advised of split  billing requirements and indicates understanding: Yes          Counseling  Appropriate preventive services were discussed with this patient, including applicable screening as appropriate for fall prevention, nutrition, physical activity, Tobacco-use cessation, weight loss and cognition.  Checklist reviewing preventive services available has been given to the patient.  Reviewed patient's diet, addressing concerns and/or questions.   She is at risk for psychosocial distress and has been provided with information to reduce risk.       Follow-up in 1 year for annual wellness visit    Karmen Wolf is a 68 year old, presenting for the following: Here for annual wellness visit and to follow-up with intermittent anxiety.  See assessment and plan for details  Physical (AWV)        3/12/2024     2:19 PM   Additional Questions   Roomed by Jennifer MONZON         Health Care Directive   Patient states has Advance Directive and will bring in a copy to clinic.          3/12/2024   General Health   How would you rate your overall physical health? Excellent   Feel stress (tense, anxious, or unable to sleep) Only a little   (!) STRESS CONCERN      3/12/2024   Nutrition   Diet: Regular (no restrictions)         3/12/2024   Exercise   Days per week of moderate/strenous exercise 6 days         3/12/2024   Social Factors   Frequency of gathering with friends or relatives Twice a week   Worry food won't last until get money to buy more No   Food not last or not have enough money for food? No   Do you have housing?  Yes   Are you worried about losing your housing? No   Lack of transportation? No   Unable to get utilities (heat,electricity)? No         3/12/2024   Activities of Daily Living- Home Safety   Needs help with the following daily activites None of the above   Safety concerns in the home None of the above         3/12/2024   Dental   Dentist two times every year? Yes         3/12/2024   Hearing Screening   Hearing concerns? None of  the above         3/12/2024   Driving Risk Screening   Patient/family members have concerns about driving No         3/12/2024   General Alertness/Fatigue Screening   Have you been more tired than usual lately? No         3/12/2024   Urinary Incontinence Screening   Bothered by leaking urine in past 6 months No            Today's PHQ-2 Score:       3/12/2024     1:51 PM   PHQ-2 ( 1999 Pf[employer])   Q1: Little interest or pleasure in doing things 0   Q2: Feeling down, depressed or hopeless 0   PHQ-2 Score 0   Q1: Little interest or pleasure in doing things Not at all   Q2: Feeling down, depressed or hopeless Not at all   PHQ-2 Score 0           3/12/2024   Substance Use   Alcohol more than 3/day or more than 7/wk No   Do you have a current opioid prescription? No   How severe/bad is pain from 1 to 10? 0/10 (No Pain)   Do you use any other substances recreationally? No     Social History     Tobacco Use    Smoking status: Never     Passive exposure: Never    Smokeless tobacco: Never   Vaping Use    Vaping Use: Never used   Substance Use Topics    Alcohol use: Not Currently     Comment: Alcoholic Drinks/day: rare    Drug use: Never           1/8/2024   LAST FHS-7 RESULTS   1st degree relative breast or ovarian cancer No   Any relative bilateral breast cancer No   Any male have breast cancer No   Any ONE woman have BOTH breast AND ovarian cancer No   Any woman with breast cancer before 50yrs No   2 or more relatives with breast AND/OR ovarian cancer Yes   2 or more relatives with breast AND/OR bowel cancer Yes            ASCVD Risk   The ASCVD Risk score (Vianney GLASS, et al., 2019) failed to calculate for the following reasons:    The valid HDL cholesterol range is 20 to 100 mg/dL            Reviewed and updated as needed this visit by Provider   Tobacco  Allergies  Meds  Problems  Med Hx  Surg Hx  Fam Hx            Past Medical History:   Diagnosis Date    Acute pain of right knee 06/23/2016    Anxiety  8/18/2022    Family history of esophageal cancer 04/08/2021    SHAMA (generalized anxiety disorder) 12/13/2016    GERD without esophagitis     EGD with no esophagitis or stricture May 2021    Hot flashes 4/15/2022    Palpitations 07/13/2021    Holter monitor worn previously    Screening for colon cancer     Normal colonoscopy July 2020    TMJ (temporomandibular joint syndrome) 10/03/2018     Past Surgical History:   Procedure Laterality Date    BIOPSY BREAST Left     TONSILLECTOMY      ZZC TOTAL ABDOM HYSTERECTOMY      Description: Total Abdominal Hysterectomy;  Recorded: 02/17/2011;  Comments: done 2001     Current providers sharing in care for this patient include:  Patient Care Team:  Richar Rodas MD as PCP - General  Richar Rodas MD as Assigned PCP    The following health maintenance items are reviewed in Epic and correct as of today:  Health Maintenance   Topic Date Due    DEXA  Never done    ZOSTER IMMUNIZATION (1 of 2) Never done    RSV VACCINE (Pregnancy & 60+) (1 - 1-dose 60+ series) Never done    DTAP/TDAP/TD IMMUNIZATION (2 - Td or Tdap) 03/14/2023    COVID-19 Vaccine (4 - 2023-24 season) 09/01/2023    MEDICARE ANNUAL WELLNESS VISIT  03/12/2025    ANNUAL REVIEW OF HM ORDERS  03/12/2025    FALL RISK ASSESSMENT  03/12/2025    GLUCOSE  08/11/2025    MAMMO SCREENING  01/08/2026    ADVANCE CARE PLANNING  07/13/2026    LIPID  08/11/2027    COLORECTAL CANCER SCREENING  07/31/2030    HEPATITIS C SCREENING  Completed    PHQ-2 (once per calendar year)  Completed    INFLUENZA VACCINE  Completed    Pneumococcal Vaccine: 65+ Years  Completed    IPV IMMUNIZATION  Aged Out    HPV IMMUNIZATION  Aged Out    MENINGITIS IMMUNIZATION  Aged Out    RSV MONOCLONAL ANTIBODY  Aged Out         Review of Systems  Constitutional, HEENT, cardiovascular, pulmonary, GI, , musculoskeletal, neuro, skin, endocrine and psych systems are negative, except as otherwise noted.     Objective    Exam  /78 (BP Location: Right  "arm, Patient Position: Sitting, Cuff Size: Adult Regular)   Pulse 84   Temp 97.8  F (36.6  C) (Oral)   Resp 14   Ht 1.651 m (5' 5\")   Wt 63 kg (139 lb)   LMP  (LMP Unknown)   SpO2 98%   Breastfeeding No   BMI 23.13 kg/m     Estimated body mass index is 23.13 kg/m  as calculated from the following:    Height as of this encounter: 1.651 m (5' 5\").    Weight as of this encounter: 63 kg (139 lb).    Physical Exam  EYES: Eyelids, conjunctiva, and sclera were normal. Pupils were normal. Cornea, iris, and lens were normal bilaterally.  HEAD, EARS, NOSE, MOUTH, AND THROAT: Head and face were normal. TMs and external auditory canals are normal.  Oropharynx normal  NECK: Neck appearance was normal. There were no neck masses and the thyroid was not enlarged and no nodules are felt.  No lymphadenopathy.  RESPIRATORY: Breathing pattern was normal and the chest moved symmetrically.   Lung sounds were normal and there were no rales or wheezes.  CARDIOVASCULAR: Heart rate and rhythm were normal.  S1 and S2 were normal and there were no extra sounds or murmurs. Peripheral pulses in arms and legs were normal.  There was no peripheral edema.  No carotid bruits.  GASTROINTESTINAL:  Bowel sounds were present.   Palpation detected no tenderness, mass, or enlarged organs.   MUSCULOSKELETAL: Skeletal configuration was normal and muscle mass was normal for age. Joint appearance was overall normal.  LYMPHATIC: There were no enlarged nodes.  SKIN/HAIR/NAILS: Skin color was normal.  There were no concerning skin lesions.  NEUROLOGIC: The patient was alert and oriented to person, place, time, and circumstance. Speech was normal. Cranial nerves were normal. Motor strength was normal for age. The patient was normally coordinated.  Sensation intact.  PSYCHIATRIC:  Mood and affect were normal and the patient had normal recent and remote memory. The patient's judgment and insight were normal.         3/12/2024   Mini Cog   Clock Draw Score " 2 Normal   3 Item Recall 3 objects recalled   Mini Cog Total Score 5            Signed Electronically by: Richar Rodas MD

## 2024-03-12 NOTE — PATIENT INSTRUCTIONS
Preventive Care Advice   This is general advice given by our system to help you stay healthy. However, your care team may have specific advice just for you. Please talk to your care team about your preventive care needs.  Nutrition  Eat 5 or more servings of fruits and vegetables each day.  Try wheat bread, brown rice and whole grain pasta (instead of white bread, rice, and pasta).  Get enough calcium and vitamin D.  Try to get at least 1200 mg of calcium daily in your diet  Lifestyle  Exercise at least 150 minutes each week   (30 minutes a day, 5 days a week).  Do muscle strengthening activities 2 days a week. These help control your weight and prevent disease.  No smoking.  Wear sunscreen to prevent skin cancer.  Have a dental exam and cleaning every 6 months.  Annual eye exam  Yearly exams  See your health care team every year to talk about:  Any changes in your health.  Any medicines your care team has prescribed.  Preventive care, family planning, and ways to prevent chronic diseases.  Shots (vaccines)   COVID-19 shot: Get this shot when it's due.  Flu shot: Get a flu shot every year.  Tetanus shot: You are due for a tetanus booster.  You can schedule a Td vaccine at your pharmacy  I would recommend getting an RSV vaccine completed.  This can also be scheduled at your pharmacy  Shingles shot (for age 50 and up).  Getting a Shingrix vaccine series completed is also recommended  General health tests  Diabetes screening:  Cholesterol screening  Bone density scan (DEXA): DEXA is recommended for osteoporosis screening and this will be scheduled  Hepatitis C: Get tested at least once in your life.  You can schedule a CT coronary calcium score by calling [facility] at [phone].  You can arrange to pay out-of-pocket leaving insurance out and it should cost no more than $99    Cancer screening tests  Breast cancer scan (mammogram): Annual mammogram  Colon cancer screening: It is important to start screening  for colon cancer at age 45.  Colonoscopy should be repeated in 2030  For informational purposes only. Not to replace the advice of your health care provider. Copyright   2023 [employer]. All rights reserved. Clinically reviewed by the [facility] Transitions Program. [employer] Direct [id number] - REV 01/24.

## 2024-03-13 LAB
ALBUMIN SERPL BCG-MCNC: 4.5 G/DL (ref 3.5–5.2)
ALP SERPL-CCNC: 84 U/L (ref 40–150)
ALT SERPL W P-5'-P-CCNC: 20 U/L (ref 0–50)
ANION GAP SERPL CALCULATED.3IONS-SCNC: 11 MMOL/L (ref 7–15)
AST SERPL W P-5'-P-CCNC: 22 U/L (ref 0–45)
BILIRUB SERPL-MCNC: 0.4 MG/DL
BUN SERPL-MCNC: 14.3 MG/DL (ref 8–23)
CALCIUM SERPL-MCNC: 9.6 MG/DL (ref 8.8–10.2)
CHLORIDE SERPL-SCNC: 102 MMOL/L (ref 98–107)
CHOLEST SERPL-MCNC: 234 MG/DL
CREAT SERPL-MCNC: 0.67 MG/DL (ref 0.51–0.95)
DEPRECATED HCO3 PLAS-SCNC: 26 MMOL/L (ref 22–29)
EGFRCR SERPLBLD CKD-EPI 2021: >90 ML/MIN/1.73M2
FASTING STATUS PATIENT QL REPORTED: YES
GLUCOSE SERPL-MCNC: 111 MG/DL (ref 70–99)
HDLC SERPL-MCNC: 110 MG/DL
LDLC SERPL CALC-MCNC: 112 MG/DL
NONHDLC SERPL-MCNC: 124 MG/DL
POTASSIUM SERPL-SCNC: 3.8 MMOL/L (ref 3.4–5.3)
PROT SERPL-MCNC: 6.9 G/DL (ref 6.4–8.3)
SODIUM SERPL-SCNC: 139 MMOL/L (ref 135–145)
TRIGL SERPL-MCNC: 59 MG/DL

## 2024-03-14 DIAGNOSIS — R73.01 IMPAIRED FASTING GLUCOSE: Primary | ICD-10-CM

## 2024-03-14 LAB — HBA1C MFR BLD: 5.5 % (ref 0–5.6)

## 2024-04-10 ENCOUNTER — OFFICE VISIT (OUTPATIENT)
Dept: ORTHOPEDIC SURGERY | Facility: CLINIC | Age: 69
End: 2024-04-10
Payer: MEDICARE

## 2024-04-10 VITALS
WEIGHT: 118.6 LBS | HEIGHT: 63 IN | SYSTOLIC BLOOD PRESSURE: 114 MMHG | DIASTOLIC BLOOD PRESSURE: 72 MMHG | BODY MASS INDEX: 21.02 KG/M2

## 2024-04-10 DIAGNOSIS — S64.01XA: Primary | ICD-10-CM

## 2024-04-10 NOTE — PROGRESS NOTES
[facility]     Follow-up Office Visit       Date: 04/10/2024   Patient Name: Kiesha Dempsey  MRN: [id number]  YOB: 1955    Chief Complaint:   Chief Complaint   Patient presents with    Follow-up     3 month follow-up--4.5 month status post RIGHT small finger ulnar digital nerve reconstruction with nerve allograft , small finger radial digital nerve neurolysis, and intrinsic muscle repair       History of Present Illness:   Kiesha Dempsey is a 69 y.o. female presents for follow-up status post right small finger ulnar digital nerve reconstruction with allograft November 2023.  She reports has been doing well since her last visit.  Is completed hand therapy and has near normal range of motion of the hand.  Full flexion extension.  She does report she continues to have some paresthesias on the ulnar aspect of her right small finger that have improved.  She also reports that she intermittently has numbness and tingling in all 5 fingers of her hand that is worse at night and wakes her from sleep.  Denies weakness.  Does have some sensitivity over her ulnar palmar scar.    He is also noticed a small right dorsal hand mass that has been present since the injury, denies drainage, pain or erythema.      Subjective   Review of Systems:   Review of Systems   Constitutional: Negative.  Negative for chills, fatigue and fever.   HENT: Negative.  Negative for congestion and dental problem.    Eyes: Negative.  Negative for blurred vision.   Respiratory: Negative.  Negative for shortness of breath.    Cardiovascular: Negative.  Negative for leg swelling.   Gastrointestinal: Negative.  Negative for abdominal pain.   Endocrine: Negative.  Negative for polyuria.   Genitourinary: Negative.  Negative for difficulty urinating.   Musculoskeletal:  Positive for arthralgias.   Skin: Negative.    Allergic/Immunologic: Negative.    Neurological:  "Negative.    Hematological: Negative.  Negative for adenopathy.   Psychiatric/Behavioral: Negative.  Negative for behavioral problems.         Pertinent review of systems per HPI    I reviewed the patient's chief complaint, history of present illness, review of systems, past medical history, surgical history, family history, social history, medications and allergy list in the EMR on 04/10/2024 and agree with the findings above.    Objective    Vital Signs:   Vitals:    04/10/24 0937   BP: 114/72   Weight: 53.8 kg (118 lb 9.6 oz)   Height: 160 cm (63\")     BMI: Body mass index is 21.01 kg/m².     General Appearance: No acute distress. Alert and oriented.     Chest:  Non-labored breathing on room air      Ortho Exam:  Right upper extremity scar well-healed.  Positive Tinel at the hook of the hamate.  Positive Tinel at the carpal tunnel.  Positive carpal compression test.  Negative Tinel at the cubital tunnel.  Negative cubital tunnel compression test.  Sensation intact in the ulnar nerve distribution except for distally at the ulnar aspect of the small finger.  She does have some sensation of light touch to approximately the level of the PIP of the small finger on the ulnar side.  Full flexion extension of the hand.  She is able to abduct the small finger.    There is a small subcutaneous dorsal mass that is firm and mobile over the dorsal hand.  No overlying wound.  Fingers warm and well-perfused distally  Sensation intact to light touch in the median, radial  nerve distributions    Imaging/Studies:   Imaging Results (Last 24 Hours)       Procedure Component Value Units Date/Time    XR Hand 3+ View Right [[id number]] Resulted: 04/10/24 1006     Updated: 04/10/24 1006    Narrative:      Right Hand X-Ray    Indication: dorsal mass    Views:  AP, Lateral, and Oblique     Comparison:  None    Findings:  No fracture  No bony lesion  Normal soft tissues  Normal joint spaces    Impression:   No bony abnormality        "       Procedures:  Procedures    Quality Measures:   ACP:   ACP discussion was deferred.    Tobacco:   Kiesha Dempsey  reports that she has never smoked. She has never been exposed to tobacco smoke. She has never used smokeless tobacco.    Assessment / Plan    Assessment/Plan:      Diagnosis Plan   1. Laceration of right ulnar nerve at wrist and hand level, initial encounter  EMG & Nerve Conduction Test    XR Hand 3+ View Right          Patient presents for 4 and half month follow-up status post right small finger ulnar digital nerve reconstruction as well as neurolysis after dog bite.  She has significant improvement in her scar sensitivity and has had progression of her Tinel at the site of the ulnar digital nerve reconstruction.  She continues to have some hypersensitivity at Guyon's canal as well as some evidence of mild carpal tunnel syndrome on exam.  I will get an EMG nerve conduction study to evaluate the severity of her carpal tunnel syndrome.  Recommend nighttime bracing for carpal tunnel syndrome.  Recommend continue scar massage and home therapy.  Follow-up with me after EMG nerve conduction study to discuss the results.    Follow Up:   Return for Follow-up after EMG/NCS.        Hernan Reid MD  [facility] Hand and Upper Extremity Surgeon

## 2024-04-19 DIAGNOSIS — S64.01XA: ICD-10-CM

## 2024-05-17 ENCOUNTER — OFFICE VISIT (OUTPATIENT)
Dept: FAMILY MEDICINE | Facility: CLINIC | Age: 69
End: 2024-05-17
Payer: COMMERCIAL

## 2024-05-17 VITALS
WEIGHT: 140.3 LBS | RESPIRATION RATE: 14 BRPM | SYSTOLIC BLOOD PRESSURE: 146 MMHG | HEART RATE: 108 BPM | DIASTOLIC BLOOD PRESSURE: 64 MMHG | TEMPERATURE: 99 F | OXYGEN SATURATION: 98 % | HEIGHT: 65 IN | BODY MASS INDEX: 23.38 KG/M2

## 2024-05-17 DIAGNOSIS — R10.9 ABDOMINAL CRAMPING: ICD-10-CM

## 2024-05-17 DIAGNOSIS — R30.0 DYSURIA: Primary | ICD-10-CM

## 2024-05-17 DIAGNOSIS — R03.0 ELEVATED BLOOD PRESSURE READING WITHOUT DIAGNOSIS OF HYPERTENSION: ICD-10-CM

## 2024-05-17 LAB
ALBUMIN UR-MCNC: NEGATIVE MG/DL
APPEARANCE UR: CLEAR
BILIRUB UR QL STRIP: NEGATIVE
COLOR UR AUTO: YELLOW
GLUCOSE UR STRIP-MCNC: NEGATIVE MG/DL
HGB UR QL STRIP: NEGATIVE
KETONES UR STRIP-MCNC: NEGATIVE MG/DL
LEUKOCYTE ESTERASE UR QL STRIP: NEGATIVE
NITRATE UR QL: NEGATIVE
PH UR STRIP: 5 [PH] (ref 5–8)
SP GR UR STRIP: 1.02 (ref 1–1.03)
UROBILINOGEN UR STRIP-ACNC: 0.2 E.U./DL

## 2024-05-17 PROCEDURE — 99213 OFFICE O/P EST LOW 20 MIN: CPT | Performed by: NURSE PRACTITIONER

## 2024-05-17 PROCEDURE — 81003 URINALYSIS AUTO W/O SCOPE: CPT | Performed by: NURSE PRACTITIONER

## 2024-05-17 ASSESSMENT — ENCOUNTER SYMPTOMS: ABDOMINAL PAIN: 1

## 2024-05-17 NOTE — PROGRESS NOTES
Assessment & Plan     Dysuria  Negative urine.  No evidence of urinary tract infection  - UA Macroscopic with reflex to Microscopic and Culture - Clinic Collect    Abdominal cramping  Negative stool sample.  Unclear cause of the abdominal cramping.  I did discuss this is potentially a viral illness.  No red flags or acute abdomen on exam today.  I recommend follow-up if symptoms are not improving over the next 3 to 5 days.  We could consider doing abdominal CT to evaluate further.  - Enteric Bacteria and Virus Panel by [facility] Stool; Future  - Enteric Bacteria and Virus Panel by [facility] Stool    Elevated blood pressure reading without diagnosis of hypertension  Blood pressure was elevated today in clinic.  I would recommend follow-up in the next 2 to 4 weeks to recheck                Subjective   Maryann is a 69 year old, presenting for the following health issues:  Abdominal Pain (Lower Quad Pain - 3-4 days. )        5/17/2024     2:35 PM   Additional Questions   Roomed by Ally MONZON CMA   Accompanied by None     History of Present Illness       Reason for visit:  Lower abdominal pain  Symptom onset:  1-3 days ago  Symptoms include:  Pain and cramping  Symptom intensity:  Moderate  Symptom progression:  Worsening  Had these symptoms before:  No  What makes it better:  Going to the bathroom    She eats 2-3 servings of fruits and vegetables daily.She consumes 0 sweetened beverage(s) daily.She exercises with enough effort to increase her heart rate 30 to 60 minutes per day.  She exercises with enough effort to increase her heart rate 6 days per week.   She is taking medications regularly.   Crampy feeling. Felt better to urinate. Went to bathroom and felt better.  Mid low abdomen.   Last evening would get cramps and if passed gas felt better. In the middle of the night woke up and peed and poopped and didn't feel better.    Pain 7/10 at the highest.    Denies pain with urination. Some urination frequency     Recently in [place].  "Loose stools when trying to collect urine and there was some contamination.               Objective    BP (!) 146/64   Pulse 108   Temp 99  F (37.2  C)   Resp 14   Ht 1.651 m (5' 5\")   Wt 63.6 kg (140 lb 4.8 oz)   LMP  (LMP Unknown)   SpO2 98%   BMI 23.35 kg/m    Body mass index is 23.35 kg/m .  Physical Exam  Constitutional:       Appearance: Normal appearance.   Abdominal:      General: Abdomen is flat. Bowel sounds are normal.      Tenderness: There is generalized abdominal tenderness. There is no right CVA tenderness or left CVA tenderness. Negative signs include Juárez's sign and McBurney's sign.   Neurological:      General: No focal deficit present.      Mental Status: She is alert and oriented to person, place, and time.   Psychiatric:         Mood and Affect: Mood normal.         Behavior: Behavior normal.            Results for orders placed or performed in visit on 05/17/24   UA Macroscopic with reflex to Microscopic and Culture - Clinic Collect     Status: Normal    Specimen: Urine, Midstream   Result Value Ref Range    Color Urine Yellow Colorless, Straw, Light Yellow, Yellow    Appearance Urine Clear Clear    Glucose Urine Negative Negative mg/dL    Bilirubin Urine Negative Negative    Ketones Urine Negative Negative mg/dL    Specific Gravity Urine 1.020 1.005 - 1.030    Blood Urine Negative Negative    pH Urine 5.0 5.0 - 8.0    Protein Albumin Urine Negative Negative mg/dL    Urobilinogen Urine 0.2 0.2, 1.0 E.U./dL    Nitrite Urine Negative Negative    Leukocyte Esterase Urine Negative Negative    Narrative    Microscopic not indicated   Enteric Bacteria and Virus Panel by [facility] Stool     Status: Normal    Specimen: Per Rectum; Stool   Result Value Ref Range    Campylobacter species Negative Negative    Salmonella species Negative Negative    Vibrio species Negative Negative    Vibrio cholerae Negative Negative    Yersinia enterocolitica Negative Negative    Enteropathogenic E. coli (EPEC) " Negative Negative, NA    Shiga-like toxin-producing E. coli (STEC) Negative Negative    Shigella/Enteroinvasive E. coli (EIEC) Negative Negative    Cryptosporidium species Negative Negative    Giardia lamblia Negative Negative    Norovirus Gl/Gll Negative Negative    Rotavirus A Negative Negative    Plesiomonas shigelloides Negative Negative    Enteroaggregative E. coli (EAEC) Negative Negative    Enterotoxigenic E. coli (ETEC) Negative Negative    E. coli O157 NA Negative, NA    Cyclospora cayetanensis Negative Negative    Entamoeba histolytica Negative Negative    Adenovirus F40/41 Negative Negative    Astrovirus Negative Negative    Sapovirus Negative Negative    Narrative    Assay performed using the FDA-cleared FilmArray GI Panel from [employer]  A negative result should not rule out infection in patients with a probability for gastrointestinal infection. The assay does not test for all potential infectious agents of diarrheal disease.  Positive results do not distinguish between a viable or replicating organism and the presence of a nonviable organism or nucleic acid, nor do they exclude the possibility of coinfection by organisms not in the panel.  Results are intended to aid in the diagnosis of illness and are meant to be used in conjunction with other clinical findings.  This test has been verified and is performed by the Infectious Diseases Diagnostic Laboratory at [facility]. This laboratory is certified under the Clinical Laboratory Improvement Amendments of 1988 (CLIA-88) as qualified to perform high complexity clinical laboratory testing.           Signed Electronically by: CASH JUAREZ CNP

## 2024-05-18 PROCEDURE — 87507 IADNA-DNA/RNA PROBE TQ 12-25: CPT | Performed by: NURSE PRACTITIONER

## 2024-05-19 LAB
ADV 40+41 DNA STL QL NAA+NON-PROBE: NEGATIVE
ASTRO TYP 1-8 RNA STL QL NAA+NON-PROBE: NEGATIVE
C CAYETANENSIS DNA STL QL NAA+NON-PROBE: NEGATIVE
CAMPYLOBACTER DNA SPEC NAA+PROBE: NEGATIVE
CRYPTOSP DNA STL QL NAA+NON-PROBE: NEGATIVE
E COLI O157 DNA STL QL NAA+NON-PROBE: NORMAL
E HISTOLYT DNA STL QL NAA+NON-PROBE: NEGATIVE
EAEC ASTA GENE ISLT QL NAA+PROBE: NEGATIVE
EC STX1+STX2 GENES STL QL NAA+NON-PROBE: NEGATIVE
EPEC EAE GENE STL QL NAA+NON-PROBE: NEGATIVE
ETEC LTA+ST1A+ST1B TOX ST NAA+NON-PROBE: NEGATIVE
G LAMBLIA DNA STL QL NAA+NON-PROBE: NEGATIVE
NOROVIRUS GI+II RNA STL QL NAA+NON-PROBE: NEGATIVE
P SHIGELLOIDES DNA STL QL NAA+NON-PROBE: NEGATIVE
RVA RNA STL QL NAA+NON-PROBE: NEGATIVE
SALMONELLA SP RPOD STL QL NAA+PROBE: NEGATIVE
SAPO I+II+IV+V RNA STL QL NAA+NON-PROBE: NEGATIVE
SHIGELLA SP+EIEC IPAH ST NAA+NON-PROBE: NEGATIVE
V CHOLERAE DNA SPEC QL NAA+PROBE: NEGATIVE
VIBRIO DNA SPEC NAA+PROBE: NEGATIVE
Y ENTEROCOL DNA STL QL NAA+PROBE: NEGATIVE

## 2024-05-20 ENCOUNTER — HOSPITAL ENCOUNTER (EMERGENCY)
Facility: CLINIC | Age: 69
Discharge: HOME | End: 2024-05-20
Admitting: EMERGENCY MEDICINE
Payer: COMMERCIAL

## 2024-05-20 ENCOUNTER — NURSE TRIAGE (OUTPATIENT)
Dept: NURSING | Facility: CLINIC | Age: 69
End: 2024-05-20
Payer: COMMERCIAL

## 2024-05-20 VITALS
DIASTOLIC BLOOD PRESSURE: 70 MMHG | HEART RATE: 81 BPM | SYSTOLIC BLOOD PRESSURE: 144 MMHG | BODY MASS INDEX: 23.32 KG/M2 | WEIGHT: 140 LBS | RESPIRATION RATE: 19 BRPM | OXYGEN SATURATION: 98 % | TEMPERATURE: 97.1 F | HEIGHT: 65 IN

## 2024-05-20 DIAGNOSIS — S61.011A LACERATION OF RIGHT THUMB WITHOUT FOREIGN BODY WITHOUT DAMAGE TO NAIL, INITIAL ENCOUNTER: ICD-10-CM

## 2024-05-20 DIAGNOSIS — Z23 NEED FOR DIPHTHERIA-TETANUS-PERTUSSIS (TDAP) VACCINE: ICD-10-CM

## 2024-05-20 PROCEDURE — 250N000011 HC RX IP 250 OP 636: Performed by: PHYSICIAN ASSISTANT

## 2024-05-20 PROCEDURE — 99283 EMERGENCY DEPT VISIT LOW MDM: CPT | Mod: 25

## 2024-05-20 PROCEDURE — 12001 RPR S/N/AX/GEN/TRNK 2.5CM/<: CPT

## 2024-05-20 PROCEDURE — 90471 IMMUNIZATION ADMIN: CPT | Performed by: PHYSICIAN ASSISTANT

## 2024-05-20 PROCEDURE — 90715 TDAP VACCINE 7 YRS/> IM: CPT | Performed by: PHYSICIAN ASSISTANT

## 2024-05-20 RX ADMIN — CLOSTRIDIUM TETANI TOXOID ANTIGEN (FORMALDEHYDE INACTIVATED), CORYNEBACTERIUM DIPHTHERIAE TOXOID ANTIGEN (FORMALDEHYDE INACTIVATED), BORDETELLA PERTUSSIS TOXOID ANTIGEN (GLUTARALDEHYDE INACTIVATED), BORDETELLA PERTUSSIS FILAMENTOUS HEMAGGLUTININ ANTIGEN (FORMALDEHYDE INACTIVATED), BORDETELLA PERTUSSIS PERTACTIN ANTIGEN, AND BORDETELLA PERTUSSIS FIMBRIAE 2/3 ANTIGEN 0.5 ML: 5; 2; 2.5; 5; 3; 5 INJECTION, SUSPENSION INTRAMUSCULAR at 13:33

## 2024-05-20 ASSESSMENT — COLUMBIA-SUICIDE SEVERITY RATING SCALE - C-SSRS
6. HAVE YOU EVER DONE ANYTHING, STARTED TO DO ANYTHING, OR PREPARED TO DO ANYTHING TO END YOUR LIFE?: NO
2. HAVE YOU ACTUALLY HAD ANY THOUGHTS OF KILLING YOURSELF IN THE PAST MONTH?: NO
1. IN THE PAST MONTH, HAVE YOU WISHED YOU WERE DEAD OR WISHED YOU COULD GO TO SLEEP AND NOT WAKE UP?: NO

## 2024-05-20 NOTE — ED NOTES
Patient verbalized understanding of discharge instructions including medication administration and recommended follow up care as noted on discharge instructions.  Written discharge instructions given, denies any further questions.  Prescriptions: none.  Barriers to learning identified and addressed:  None observed

## 2024-05-20 NOTE — TELEPHONE ENCOUNTER
S-(situation): cut on right thumb pad    B-(background):   Pt was slicing cucumbers using a mandolin slicer without her glove on, and accidentally cut her thumb pad.    A-(assessment):   3/4 inch cut on right thumb pad, area is bleeding.    R-(recommendations):   ED per protocol.    Pt prefers to go to [facility].  FNA attempted to call [facility] to notify, line kept hanging up after multiple attempts.    Alexia Singh RN/[place] Nurse Advisor          Reason for Disposition   Bleeding and won't stop after 10 minutes of direct pressure (using correct technique)   Skin is split open or gaping (or length > 1/2 inch or 12 mm on the skin, 1/4 inch or 6 mm on the face)    Additional Information   Negative: Major bleeding (e.g., actively dripping or spurting) and can't be stopped   Negative: Amputation   Negative: Shock suspected (e.g., cold/pale/clammy skin, too weak to stand, low BP, rapid pulse)   Negative: Knife wound (or other possibly deep cut) and to chest, abdomen, back, neck, or head   Negative: Self-injury (e.g., cutting, self-harm) and suicidal or out-of-control   Negative: Sounds like a life-threatening emergency to the triager   Negative: Animal bite and broken skin   Negative: Human bite and broken skin   Negative: Puncture wound    Protocols used: Cuts and [id number]-Y-MF

## 2024-05-20 NOTE — ED TRIAGE NOTES
Pt has a laceration to the tip of her right thumb from a Mandelin.      Triage Assessment (Adult)       Row Name 05/20/24 1232          Triage Assessment    Airway WDL WDL        Respiratory WDL    Respiratory WDL WDL        Skin Circulation/Temperature WDL    Skin Circulation/Temperature WDL WDL        Cardiac WDL    Cardiac WDL WDL        Peripheral/Neurovascular WDL    Peripheral Neurovascular WDL WDL        Cognitive/Neuro/Behavioral WDL    Cognitive/Neuro/Behavioral WDL WDL

## 2024-05-20 NOTE — ED PROVIDER NOTES
EMERGENCY DEPARTMENT ENCOUNTER   NAME: Maryann Atkinson ; AGE: 69 year old female ; YOB: 1955 ; MRN: [id number] ; PCP: Richar Rodas     Evaluation Date & Time: No admission date for patient encounter.    ED Provider: Hailey Cavanaugh PA-C    CHIEF COMPLAINT     Laceration      FINAL ASSESSMENT       ICD-10-CM    1. Laceration of right thumb without foreign body without damage to nail, initial encounter  S61.011A       2. Need for diphtheria-tetanus-pertussis (Tdap) vaccine  Z23           ED COURSE, MEDICAL DECISION MAKING, PLAN     ED course     1:20 PM: Evaluated patient. Performed physical exam. Plan for tetanus shot, wound cleaning, and dermabond repair.   1:32 PM: Wound was cleaned out and repaired with Dermabond.  Discharge plans discussed.  RN to discharge after Tdap.    ______________________________________________________________________    Maryann Atkinson is a 69 year old female with no pertinent past medical history presenting for a right thumb laceration that occurred shortly prior to presentation while she was cutting cucumbers with a mandolin.      Last tetanus shot in 2023.  Will be updated here today.    Exam reveals a 1 cm well-approximated laceration on the pad of the right thumb.  Very small amount of active bleeding.  The rest of the exam is unremarkable.  She is hypertensive at 152/65, but otherwise vitally normal.  No suggestion of endorgan damage.  Patient should monitor and follow-up with PCP as needed.    The wound was cleaned and repaired with Dermabond as discussed below.  Bandage applied and an aluminum finger splint provided for protection.  Tdap updated.    Indications for reevaluation discussed with patient.    She is understanding and agreeable with the plan will discharged home in good condition.    ______________________________________________________________________    *All pertinent lab & imaging studies independently reviewed. (See chart for details)    *Discussed the results of all the tests and plan with patient and family/guardians.   *The patient and/or family/guardian acknowledged understanding and was agreeable with the care plan.      HISTORY OF PRESENT ILLNESS   Patient information was obtained from: Patient    Use of Intrepreter: N/A     Maryann Atkinson is a 69 year old female with no pertinent PMH who presents to the ED by means of walk-in for evaluation of a right thumb laceration that occurred shortly prior to presentation.    States that she was slicing cucumbers with a mandolin and accidentally caught her thumb.    She states she poured a little bit of rubbing alcohol over the wound, wrapped it up, and came here for assessment.    Last tetanus shot was in 2023.    No other concerns.      MEDICAL HISTORY     Past Medical History:   Diagnosis Date    Acute pain of right knee 06/23/2016    Anxiety 8/18/2022    Family history of esophageal cancer 04/08/2021    SHAMA (generalized anxiety disorder) 12/13/2016    GERD without esophagitis     Hot flashes 4/15/2022    Palpitations 07/13/2021    Screening for colon cancer     TMJ (temporomandibular joint syndrome) 10/03/2018       Past Surgical History:   Procedure Laterality Date    BIOPSY BREAST Left     TONSILLECTOMY      ZZC TOTAL ABDOM HYSTERECTOMY      Description: Total Abdominal Hysterectomy;  Recorded: 02/17/2011;  Comments: done 2001       Family History   Problem Relation Age of Onset    Esophageal Cancer Mother     Heart Disease Mother         MI age 49  CABG in 70s    Brain Tumor Father     Cerebrovascular Disease Brother     Heart Disease Maternal Grandfather     Breast Cancer Maternal Aunt     Breast Cancer Paternal Aunt        Social History     Tobacco Use    Smoking status: Never     Passive exposure: Never    Smokeless tobacco: Never   Vaping Use    Vaping status: Never Used   Substance Use Topics    Alcohol use: Not Currently     Comment: Alcoholic Drinks/day: rare    Drug use: Never  "      clobetasol (TEMOVATE) 0.05 % external ointment  LORazepam (ATIVAN) 0.5 MG tablet  triamcinolone (KENALOG) 0.1 % external cream  zolpidem (AMBIEN) 5 MG tablet          PHYSICAL EXAM     First Vitals:  Patient Vitals for the past 24 hrs:   BP Temp Pulse Resp SpO2 Height Weight   05/20/24 1339 (!) 144/70 -- 81 -- 98 % -- --   05/20/24 1233 -- 97.1  F (36.2  C) -- -- -- -- --   05/20/24 1232 (!) 152/65 -- 86 19 99 % 1.651 m (5' 5\") 63.5 kg (140 lb)         PHYSICAL EXAM:   Constitutional: No acute distress.  Neuro: Awake and alert.   Psych: Calm and cooperative.  Eyes: PERRL. EOMI. Conjunctivae clear.   Cardio: Regular rate. Adequate perfusion to extremities.   Pulmonary: Oxygenating well on RA.   Upper extremities: Moves freely. No edema. Distal pulses intact. Sensations intact.   Skin: 1 cm laceration to the pad of the right thumb.  Tiny amount of active bleeding.  Wound edges approximate well.  Depth is superficial.  All other exposed skin is natural color, warm, dry, intact.       RESULTS     LAB:  All pertinent labs reviewed and interpreted  Labs Ordered and Resulted from Time of ED Arrival to Time of ED Departure - No data to display    RADIOLOGY:  No orders to display       ECG:    N/A      PROCEDURES     PROCEDURE: Laceration Repair   INDICATIONS: Laceration   PROCEDURE PROVIDER: Hailey Cavanaugh PA-C   SITE: Pad of right thumb   TYPE/SIZE: simple, superficial, clean, and no foreign body visualized  1 cm (total length)   FUNCTIONAL ASSESSMENT: Distal sensation, circulation, and motor intact   MEDICATION: None    PREPARATION: scrubbing and irrigation with Normal saline and Hibiclens   DEBRIDEMENT: no debridement   CLOSURE:   Dermabond                   MEDICAL DECISION MAKING:  Obtained supplemental history:Supplemental history obtained?: No  Reviewed external records: External records reviewed?: No  Care impacted by chronic illness:N/A  Care significantly affected by social determinants of health:N/A  Did " you consider but not order tests?: Work up considered but not performed and documented in chart, if applicable  Did you interpret images independently?: Independent interpretation of ECG and images noted in documentation, when applicable.  Consultation discussion with other provider:Did you involve another provider (consultant, , pharmacy, etc.)?: No  Discharge. No recommendations on prescription strength medication(s). See documentation for any additional details.      FINAL IMPRESSION:    ICD-10-CM    1. Laceration of right thumb without foreign body without damage to nail, initial encounter  S61.011A       2. Need for diphtheria-tetanus-pertussis (Tdap) vaccine  Z23             MEDICATIONS GIVEN IN THE EMERGENCY DEPARTMENT:  Medications   Tdap (tetanus-diphtheria-acell pertussis) (ADACEL) injection 0.5 mL (0.5 mLs Intramuscular $Given 5/20/24 1333)         NEW PRESCRIPTIONS STARTED AT TODAY'S ED VISIT:  Discharge Medication List as of 5/20/2024  1:35 PM                 Some or all of this documentation has been completed using dictation software and mild grammatical errors may be present. Please contact me with any concerns regarding this.       Hailey Cavanaugh PA-C  Emergency Medicine   [facility] EMERGENCY ROOM       Hailey Cavanaugh PA-C  05/20/24 2393

## 2024-12-09 ENCOUNTER — OFFICE VISIT (OUTPATIENT)
Dept: NEUROSURGERY | Facility: CLINIC | Age: 69
End: 2024-12-09
Payer: MEDICARE

## 2024-12-09 ENCOUNTER — HOSPITAL ENCOUNTER (OUTPATIENT)
Dept: GENERAL RADIOLOGY | Facility: HOSPITAL | Age: 69
Discharge: HOME | End: 2024-12-09
Admitting: PHYSICIAN ASSISTANT
Payer: MEDICARE

## 2024-12-09 VITALS
BODY MASS INDEX: 21.79 KG/M2 | HEIGHT: 63 IN | SYSTOLIC BLOOD PRESSURE: 104 MMHG | WEIGHT: 123 LBS | DIASTOLIC BLOOD PRESSURE: 68 MMHG | TEMPERATURE: 96.9 F

## 2024-12-09 DIAGNOSIS — M50.30 DDD (DEGENERATIVE DISC DISEASE), CERVICAL: ICD-10-CM

## 2024-12-09 DIAGNOSIS — M43.16 SPONDYLOLISTHESIS OF LUMBAR REGION: ICD-10-CM

## 2024-12-09 DIAGNOSIS — M54.16 LUMBAR RADICULOPATHY: ICD-10-CM

## 2024-12-09 DIAGNOSIS — R20.0 LEFT LEG NUMBNESS: ICD-10-CM

## 2024-12-09 DIAGNOSIS — M81.8 OTHER OSTEOPOROSIS WITHOUT CURRENT PATHOLOGICAL FRACTURE: ICD-10-CM

## 2024-12-09 DIAGNOSIS — M51.362 DEGENERATION OF INTERVERTEBRAL DISC OF LUMBAR REGION WITH DISCOGENIC BACK PAIN AND LOWER EXTREMITY PAIN: ICD-10-CM

## 2024-12-09 DIAGNOSIS — M43.16 SPONDYLOLISTHESIS OF LUMBAR REGION: Primary | ICD-10-CM

## 2024-12-09 PROCEDURE — 72050 X-RAY EXAM NECK SPINE 4/5VWS: CPT

## 2024-12-09 PROCEDURE — 72114 X-RAY EXAM L-S SPINE BENDING: CPT

## 2024-12-09 NOTE — PROGRESS NOTES
Kiesha Dempsey   1955   [id number]       12/09/2024     Chief Complaint   Patient presents with    Leg Pain     LLE    Numbness     LLE    Extremity Weakness     LLE        Leg Pain   Pertinent negatives include no numbness.   Extremity Weakness   Pertinent negatives include no fever or numbness.      Ms. Dempsey is a 69-year-old female who presents today with reoccurrence of her back pain that radiates into the left leg to the knee, she has occasionally had pain that goes down to the top of the ankle.  She saw Dr. Amos in August 2023 regarding neck pain and upper extremity symptoms as well as low back pain radiating into the left lower extremity.   She was seen in the office in January 2024 and was having back pain and right leg pain, she went to physical therapy in January February and into March with improvement in her symptoms.  She has had another recent travel and her symptoms have worsened.  Today not only the back and the leg but she is also having a flareup of neck pain radiating into both shoulders and into the arm.    Chronic Illnesses:  Past Medical History:  2017: Abnormal ECG  No date: Anesthesia      Comment:  post anesthesia headahces   No date: Atrial fibrillation  No date: Colon polyp  No date: DDD (degenerative disc disease), lumbar  No date: Frequent UTI      Comment:  none recently   No date: Head concussion  No date: Kidney infection  No date: Migraines  02/08/2018: Murmur, cardiac  12/27/2019: VICKY (obstructive sleep apnea)     Past Surgical History:   Procedure Laterality Date    ABLATION OF DYSRHYTHMIC FOCUS  10/2019    CARDIAC ABLATION  10/31/2019    CARDIAC ELECTROPHYSIOLOGY PROCEDURE N/A 10/31/2019    Procedure: Ablation atrial fibrillation, hold metoprolol 3-5 days prior;  Surgeon: Bruce Rodriguez MD;  Location: [facility] INVASIVE LOCATION;  Service: Cardiovascular    COLONOSCOPY      EAR TUBES Left     HAND SURGERY Right 11/17/2023    small finger ulnar digital nerve  "reconstruction with nerve allograft , small finger radial digital nerve neurolysis, and intrinsic muscle repair -- Dr. Kristina Reid    HYSTERECTOMY  2006    bso    SKIN BIOPSY      TOTAL HIP ARTHROPLASTY Right 2015    VEIN SURGERY  2005        Allergies   Allergen Reactions    Bactrim [Sulfamethoxazole-Trimethoprim] Hives    Tramadol Shortness Of Breath, Nausea Only and Other (See Comments)     CHEST PAIN    Flecainide Other (See Comments)     Headaches      Prednisone Palpitations and GI Intolerance     \"Can't remember exact reaction; just got really sick.   Advised to not take it again\"  \"I think my heart skipped beats\"          Current Outpatient Medications:     aspirin 81 MG EC tablet, Take 1 tablet by mouth Daily., Disp: , Rfl:     Eptinezumab-jjmr (Vyepti) 100 MG/ML solution solution, Infuse 1 mL into a venous catheter Every 3 (Three) Months., Disp: , Rfl:     pantoprazole (PROTONIX) 40 MG EC tablet, Take 1 tablet by mouth As Needed., Disp: , Rfl:     Rimegepant Sulfate (Nurtec) 75 MG tablet dispersible tablet, Take  by mouth As Needed., Disp: , Rfl:      Social History     Socioeconomic History    Marital status:    Tobacco Use    Smoking status: Never     Passive exposure: Never    Smokeless tobacco: Never   Vaping Use    Vaping status: Never Used   Substance and Sexual Activity    Alcohol use: No    Drug use: No    Sexual activity: Yes     Partners: Male     Birth control/protection: Hysterectomy        family history includes Angina in her father; Arrhythmia in her father; Asthma in her mother; COPD in her mother; Cancer in her father and mother; Emphysema in her mother; Heart attack in her father and mother; Heart disease in her father and mother; Hyperlipidemia in her father and mother; Hypersomnolence in her father; Hypertension in her brother, father, and mother; Mitral valve prolapse in her father; Narcolepsy in her father; Sleep apnea in her father; Stroke in her brother, father, and " mother.     Review of Systems   Constitutional:  Negative for activity change, appetite change, chills, diaphoresis, fatigue, fever and unexpected weight change.   HENT:  Positive for hearing loss and tinnitus. Negative for congestion, dental problem, drooling, ear discharge, ear pain, facial swelling, mouth sores, nosebleeds, postnasal drip, rhinorrhea, sinus pressure, sinus pain, sneezing, sore throat, trouble swallowing and voice change.    Eyes:  Negative for photophobia, pain, discharge, redness, itching and visual disturbance.   Respiratory:  Negative for apnea, cough, choking, chest tightness, shortness of breath, wheezing and stridor.    Cardiovascular:  Negative for chest pain, palpitations and leg swelling.   Gastrointestinal:  Negative for abdominal distention, abdominal pain, anal bleeding, blood in stool, constipation, diarrhea, nausea, rectal pain and vomiting.   Endocrine: Negative for cold intolerance, heat intolerance, polydipsia, polyphagia and polyuria.   Genitourinary:  Negative for decreased urine volume, difficulty urinating, dysuria, enuresis, flank pain, frequency, genital sores, hematuria and urgency.   Musculoskeletal:  Positive for extremity weakness and neck pain. Negative for arthralgias, back pain, gait problem, joint swelling, myalgias and neck stiffness.   Skin:  Negative for color change, pallor, rash and wound.   Allergic/Immunologic: Negative for environmental allergies, food allergies and immunocompromised state.   Neurological:  Positive for light-headedness and headaches. Negative for dizziness, tremors, seizures, syncope, facial asymmetry, speech difficulty, weakness and numbness.   Hematological:  Negative for adenopathy. Does not bruise/bleed easily.   Psychiatric/Behavioral:  Negative for agitation, behavioral problems, confusion, decreased concentration, dysphoric mood, hallucinations, self-injury, sleep disturbance and suicidal ideas. The patient is not nervous/anxious and  "is not hyperactive.    All other systems reviewed and are negative.         Gait & Balance Assessment :  Risk assessment for falls. Fall precautions.  universal fall precautions, such as;   Using gait aids a cane, walker at the appropriate height at all times for ambulation or if necessary a wheelchair  Removing all area rugs and coffee tables to create a safe environment at home  Ensure clean, dry floors  Wearing supportive footwear and properly fitting clothing  Ensure bed/chair is appropriate height and patient's feet can touch the floor  Using a shower transfer bench  Using walk-in shower and having shower safety bars installed  Ensure proper lighting, minimize glare  Have nightlights operational and in use  Participation in an exercise program for gait training, balance training and strength  Avoid carrying laundry up and down steps  Ensure proper compliance and organization of medications to avoid errors   Avoid use of over the counter sedatives and alcohol consumption  Ensure easy access to call bell, glasses, TV control, telephone  Ensure glasses/hearing aids are in use or close by (on top of night table)     Social History    Tobacco Use      Smoking status: Never        Passive exposure: Never      Smokeless tobacco: Never      Body mass index is 21.79 kg/m².   BMI is within normal parameters. No other follow-up for BMI required.       Physical Examination:  /68 (BP Location: Right arm, Patient Position: Sitting, Cuff Size: Adult)   Temp 96.9 °F (36.1 °C) (Infrared)   Ht 160 cm (63\")   Wt 55.8 kg (123 lb)   BMI 21.79 kg/m²    HEENT-wnl  Lungs-No wheezing or SOB  Patient is awake, alert and oriented.  5/5 in the extremities.  Sensation intact.  Reflexes 1+.  Gait is normal  SLR causes back and left leg pain  Hip rotation negative.  Range of motion of the cervical spine is intact.    Radiological Data Review:  All neurological imaging studies were independently reviewed unless otherwise " documented.          Assessment and Plan:  Diagnoses and all orders for this visit:    1. Spondylolisthesis of lumbar region (Primary)  -     Ambulatory Referral to Physical Therapy for Evaluation & Treatment  -     XR Spine Lumbar Complete With Flex & Ext; Future  -     dexa bone density axial; Future  -     Ambulatory Referral to Pain Management Clinic    2. Degeneration of intervertebral disc of lumbar region with discogenic back pain and lower extremity pain  -     Ambulatory Referral to Physical Therapy for Evaluation & Treatment  -     XR Spine Lumbar Complete With Flex & Ext; Future  -     dexa bone density axial; Future  -     Ambulatory Referral to Pain Management Clinic    3. Lumbar radiculopathy  -     Ambulatory Referral to Physical Therapy for Evaluation & Treatment  -     XR Spine Lumbar Complete With Flex & Ext; Future  -     dexa bone density axial; Future  -     Ambulatory Referral to Pain Management Clinic    4. Left leg numbness  -     Ambulatory Referral to Physical Therapy for Evaluation & Treatment  -     XR spine cervical ap and lat w flex and ext; Future  -     XR Spine Lumbar Complete With Flex & Ext; Future  -     dexa bone density axial; Future  -     Ambulatory Referral to Pain Management Clinic    5. DDD (degenerative disc disease), cervical  -     MRI Cervical Spine Without Contrast; Future  -     Ambulatory Referral to Physical Therapy for Evaluation & Treatment  -     XR spine cervical ap and lat w flex and ext; Future  -     XR Spine Lumbar Complete With Flex & Ext; Future  -     dexa bone density axial; Future  -     Ambulatory Referral to Pain Management Clinic    6. Other osteoporosis without current pathological fracture  -     dexa bone density axial; Future    Ms. Dempsey is a 69-year-old female who presents with an exacerbation of back and left leg pain after recent travel.  She has a long history of neck pain and endorses worsening neck pain that radiates into the top of  both shoulders and down the left arm.  Years ago she had an MRI of the cervical spine which showed cervical spondylosis with multilevel degenerative disc disease.  She had an MRI of the lumbar spine from 7/21/2023 that revealed a grade 1 retrolisthesis of L1 on L2, L2 on L3 and L4 on L5 with a grade 1 anterior listhesis of L5 on S1.  Her new flexion-extension x-rays do show the offset at L5-S1 unfortunately I am unable to determine if there is movement at L5-S1.  There is multilevel facet arthropathy with stenosis at L3-4, L4-5 and L5-S1.  There is perhaps a synovial cyst on the left causing severe left foraminal narrowing.  We have discussed her returning to physical therapy and she is in agreement.,  TENS unit was beneficial.  I am making referral to pain management for an injection.  She would like to continue with conservative treatment as long as possible.  I am ordering a new cervical MRI, she went for x-rays of the cervical spine which shows degenerative disc at C3-4, disc base narrowing at C4-5, degenerative disc and bone spurring at C5-6 and C6-7.  Alignment is intact.  The patient is in agreement with all that we have discussed today.  She will return to see me after physical therapy, DEXA scan and pain management.    Including assessment, review of prior documentation, review and interpretation of new and old diagnostic studies, discussing these findings with the patient and documentation, more than 30 minutes total time was spent on this appointment.    Any copied data from previous notes included in the (1) HPI, (2) PE, (3) MDM and/or assessment and plan has been reviewed and is accurate as of this date.    Lesly Johnston, PAC    PCP:  Tyler Rucker MD

## 2024-12-19 ENCOUNTER — HOSPITAL ENCOUNTER (OUTPATIENT)
Dept: MRI IMAGING | Facility: HOSPITAL | Age: 69
Discharge: HOME | End: 2024-12-19
Admitting: PHYSICIAN ASSISTANT
Payer: MEDICARE

## 2024-12-19 DIAGNOSIS — M50.30 DDD (DEGENERATIVE DISC DISEASE), CERVICAL: ICD-10-CM

## 2024-12-19 PROCEDURE — 72141 MRI NECK SPINE W/O DYE: CPT

## 2025-01-08 ENCOUNTER — OFFICE VISIT (OUTPATIENT)
Dept: CARDIOLOGY | Facility: CLINIC | Age: 70
End: 2025-01-08
Payer: MEDICARE

## 2025-01-08 VITALS
SYSTOLIC BLOOD PRESSURE: 109 MMHG | HEART RATE: 66 BPM | WEIGHT: 121.2 LBS | HEIGHT: 63 IN | DIASTOLIC BLOOD PRESSURE: 58 MMHG | BODY MASS INDEX: 21.48 KG/M2 | OXYGEN SATURATION: 98 %

## 2025-01-08 DIAGNOSIS — I49.3 PVC (PREMATURE VENTRICULAR CONTRACTION): ICD-10-CM

## 2025-01-08 DIAGNOSIS — I48.0 PAROXYSMAL ATRIAL FIBRILLATION: Primary | ICD-10-CM

## 2025-01-08 PROCEDURE — 99214 OFFICE O/P EST MOD 30 MIN: CPT | Performed by: INTERNAL MEDICINE

## 2025-01-08 NOTE — PROGRESS NOTES
"Kiesha Dempsey  1955  [phone]    01/08/2025    [facility] CARDIOLOGY     Referring Provider: No ref. provider found     Tyler Rucker MD  [address] [place] [address]    Chief Complaint   Patient presents with    Paroxysmal atrial fibrillation       Problem List:     Paroxysmal Atrial Fibrillation  CHADSVasc = 2 on Eliquis  24 Hour Holter 3/2018: no atrial fibrillation.   Diagnosed at PCP by EKG with symptoms of palpitations 11/15/18  Treated with metoprolol - caused low BP and low HR  Flecainide started 12/2018 - intolerant due to HA  Echocardiogram 2/15/18: EF 60%, mild TR  Stress Test 4/9/18: EF 69%, no ischemia. Low risk study.   Admit to [facility] ER for Atypical chest pain, Negative markers. Normal EKG Sinus Bradycardia.  PVA with cryoablation 10/31/19  Echocardiogram 2/12/2020: EF 58%, no significant structural or functional valvular disease  Stress Echocardiogram 2/24/2021: EF 58%, no EKG evidence of ischemia, Normal stress echo with no significant echocardiographic evidence for myocardial ischemia.  Event Monitor 11/3-12/4/2021: No atrial fibrillation, occasional PACs and paroxysmal atrial tachycardia  Event monitor 6/2024 3 week No AF per pt  CP         A. Heart cath in 7/2024 Dr Lan. NL per pt            Frequent UTIs  Migraines  Colon polyps  Chronic nausea  Surgical History  Hyterectomy  Total hip arthroplasty  Vein surgery    Allergies  Allergies   Allergen Reactions    Bactrim [Sulfamethoxazole-Trimethoprim] Hives    Tramadol Shortness Of Breath, Nausea Only and Other (See Comments)     CHEST PAIN    Flecainide Other (See Comments)     Headaches      Prednisone Palpitations and GI Intolerance     \"Can't remember exact reaction; just got really sick.   Advised to not take it again\"  \"I think my heart skipped beats\"       Current Medications    Current Outpatient Medications:     aspirin 81 MG EC tablet, Take 1 tablet by mouth Daily., Disp: , Rfl:     " "Eptinezumab-jjmr (Vyepti) 100 MG/ML solution solution, Infuse 1 mL into a venous catheter Every 3 (Three) Months., Disp: , Rfl:     pantoprazole (PROTONIX) 40 MG EC tablet, Take 1 tablet by mouth As Needed., Disp: , Rfl:     Rimegepant Sulfate (Nurtec) 75 MG tablet dispersible tablet, Take  by mouth As Needed., Disp: , Rfl:     History of Present Illness     Pt presents for follow up of PAF. Since we last saw the pt, pt apparently had substantial episode of chest pain, shortness of breath, and tachypalpitations in May 2024.  She saw Dr. Lan and subsequently underwent left heart catheterization that was normal per the patient.  She also underwent a 3-week event monitor that showed no episodes of A-fib per the patient.  She stopped her hydroxyzine and subsequently her symptoms resolved.  She has done well until the other night when she had an episode of irregular heartbeat for approximately 3 hours that woke her during sleep.  This was in the setting of a migraine and initiation of medications 24 hours prior.  Subsequently she has done well with no other complaints.  She does get some lightheadedness on occasion due to her low blood pressures but no near syncope.        Vitals:    01/08/25 1415   BP: 109/58   BP Location: Right arm   Patient Position: Sitting   Cuff Size: Adult   Pulse: 66   SpO2: 98%   Weight: 55 kg (121 lb 3.2 oz)   Height: 160 cm (63\")     Body mass index is 21.47 kg/m².  PE:  General: NAD  Neck: no JVD, no carotid bruits, no TM  Heart RRR, NL S1, S2, S4 present, no rubs, murmurs  Lungs: CTA, no wheezes, rhonchi, or rales  Abd: soft, non-tender, NL BS  Ext: No musculoskeletal deformities, no edema, cyanosis, or clubbing  Psych: normal mood and affect    Diagnostic Data:      Procedures           1. Paroxysmal atrial fibrillation    2. PVC (premature ventricular contraction)          Plan:    1. PAF:  - s/p cryo ablation 10/2019 episode of tachypalpitations 3 hours long in duration most likely " attributable to migraine and/or ingestion of medications.  Monitor for recurrence at this time.  Follow-up on her event recorder results from May of this year as well as a left heart catheterization.  - CHADSvasc = 1 (age) on ASA, She has Eliquis as needed.         2. PACs/PVCs:   -rarely seen on monitor see #1  - monitor for now, overall not limiting to daily activity     3. Marginal SBP, Hydration fluids    F/up in 12 months

## 2025-01-09 ENCOUNTER — ANCILLARY PROCEDURE (OUTPATIENT)
Dept: MAMMOGRAPHY | Facility: CLINIC | Age: 70
End: 2025-01-09
Attending: INTERNAL MEDICINE
Payer: COMMERCIAL

## 2025-01-09 DIAGNOSIS — Z12.31 VISIT FOR SCREENING MAMMOGRAM: ICD-10-CM

## 2025-01-21 DIAGNOSIS — F41.1 GAD (GENERALIZED ANXIETY DISORDER): ICD-10-CM

## 2025-01-21 RX ORDER — LORAZEPAM 0.5 MG/1
TABLET ORAL
Qty: 30 TABLET | Refills: 0 | Status: SHIPPED | OUTPATIENT
Start: 2025-01-21

## 2025-01-27 ENCOUNTER — TELEPHONE (OUTPATIENT)
Dept: CARDIOLOGY | Facility: CLINIC | Age: 70
End: 2025-01-27
Payer: MEDICARE

## 2025-01-27 DIAGNOSIS — R00.2 PALPITATIONS: Primary | ICD-10-CM

## 2025-01-27 NOTE — TELEPHONE ENCOUNTER
Patient states that she was riding her recumbent bike yesterday and went in to Afib. She said that she has not had an episode of Afib since her PVA that was done on 10-31-19. She has been taking all of her medications as prescribed and has been taking PRN Eliquis since she went in to Afib.    She states that she felt short of breath, weak, dizzy and nauseated. Her HR was ranging between 120-130 bpm so she went to the ER at [facility] in [place]. She said that they did a cardioversion but it did not work. So they gave her Metoprolol and her BP dropped to 78/41. She said that her BP normally runs low, but not that low. When she was seen in clinic on 1/8/25 her BP was 109/58 and her HR was 66 bpm.    At 10:30 this morning she said that it felt like her HR was becoming more regular. She is still short of breath, but denies chest pain or edema. Her BP is now 121/70 and her HR is 68 bpm.    She states that she is feeling better today. I instructed her to continue taking Eliquis twice a day. I requested the records from [facility] for you to review.

## 2025-01-28 ENCOUNTER — PATIENT MESSAGE (OUTPATIENT)
Dept: CARDIOLOGY | Facility: CLINIC | Age: 70
End: 2025-01-28
Payer: MEDICARE

## 2025-01-28 NOTE — TELEPHONE ENCOUNTER
I reviewed her records from [facility]. EKG shows AFIB. She underwent ECV x 2, which are reported to both be unsuccessful in restoring NSR. She was given Metoprolol and told to continued Eliquis. Sounds like she might be back in NSR now. Please have her to get an EKG. She should continue Eliquis 5 mg BID for 3 weeks after she converted back to NSR. She might need a prescription if she does not have enough.

## 2025-01-29 NOTE — TELEPHONE ENCOUNTER
Patient notified and aware.     She states that since Thanksgiving her left leg has been swollen from her hip to above her ankle. She said that it is so bad that she cannot get her jeans on today. She was evaluated by one of Dr. Rucker's PA's, her PCP. She said that nobody has been able to tell her what is wrong. So she is going to have her records faxed to us for Dr. Rodriguez to review.

## 2025-01-29 NOTE — TELEPHONE ENCOUNTER
I called and spoke with patient. She will get EKG at Dr. Tyler Rucker's office (P) [phone].     She states she has not been taking the metoprolol because she fears she can not tolerate it. She states her BP today is 90/60 with a HR 71 bpm.     She is concerned because she states she has not had an episode of afib since 2019. She states this episode was exercised induced. She was on a stationary bike for 8 minutes and then she went into afib. Since she hasn't had an episode since 2019 and now she is having afib again she is worried she will continue to have episodes.     Patient is aware someone will be in contact to follow up.

## 2025-01-31 NOTE — TELEPHONE ENCOUNTER
Have her to see me, Will, or GFT in clinic in the next couple of weeks. Her EKG from 1/30/2025 shows sinus bradycardia. She does not need to take Metoprolol, but she should continue Eliquis for 3 more weeks.

## 2025-02-13 ENCOUNTER — HOSPITAL ENCOUNTER (OUTPATIENT)
Dept: BONE DENSITY | Facility: HOSPITAL | Age: 70
Discharge: HOME | End: 2025-02-13
Admitting: PHYSICIAN ASSISTANT
Payer: MEDICARE

## 2025-02-13 DIAGNOSIS — R20.0 LEFT LEG NUMBNESS: ICD-10-CM

## 2025-02-13 DIAGNOSIS — M43.16 SPONDYLOLISTHESIS OF LUMBAR REGION: ICD-10-CM

## 2025-02-13 DIAGNOSIS — M81.8 OTHER OSTEOPOROSIS WITHOUT CURRENT PATHOLOGICAL FRACTURE: ICD-10-CM

## 2025-02-13 DIAGNOSIS — M54.16 LUMBAR RADICULOPATHY: ICD-10-CM

## 2025-02-13 DIAGNOSIS — M51.362 DEGENERATION OF INTERVERTEBRAL DISC OF LUMBAR REGION WITH DISCOGENIC BACK PAIN AND LOWER EXTREMITY PAIN: ICD-10-CM

## 2025-02-13 DIAGNOSIS — M50.30 DDD (DEGENERATIVE DISC DISEASE), CERVICAL: ICD-10-CM

## 2025-02-13 PROCEDURE — 77080 DXA BONE DENSITY AXIAL: CPT

## 2025-02-19 PROBLEM — M47.816 SPONDYLOSIS OF LUMBAR REGION WITHOUT MYELOPATHY OR RADICULOPATHY: Status: ACTIVE | Noted: 2025-02-19

## 2025-02-19 PROBLEM — M43.16 SPONDYLOLISTHESIS OF LUMBAR REGION: Status: ACTIVE | Noted: 2025-02-19

## 2025-02-19 PROBLEM — M71.38 SYNOVIAL CYST OF LUMBAR FACET JOINT: Status: ACTIVE | Noted: 2025-02-19

## 2025-02-19 PROBLEM — M50.30 DDD (DEGENERATIVE DISC DISEASE), CERVICAL: Status: ACTIVE | Noted: 2025-02-19

## 2025-02-19 PROBLEM — M48.062 LUMBAR STENOSIS WITH NEUROGENIC CLAUDICATION: Status: ACTIVE | Noted: 2025-02-19

## 2025-02-19 PROBLEM — R53.81 PHYSICAL DECONDITIONING: Status: ACTIVE | Noted: 2025-02-19

## 2025-02-19 PROBLEM — M51.370 DEGENERATION OF INTERVERTEBRAL DISC OF LUMBOSACRAL REGION WITH DISCOGENIC BACK PAIN: Status: ACTIVE | Noted: 2025-02-19

## 2025-02-19 PROBLEM — M47.812 CERVICAL SPONDYLOSIS WITHOUT MYELOPATHY: Status: ACTIVE | Noted: 2025-02-19

## 2025-02-19 PROBLEM — R26.9 GAIT DISTURBANCE: Status: ACTIVE | Noted: 2025-02-19

## 2025-02-19 PROBLEM — M99.41 CONNECTIVE TISSUE STENOSIS OF NEURAL CANAL OF CERVICAL REGION: Status: ACTIVE | Noted: 2025-02-19

## 2025-02-19 NOTE — PROGRESS NOTES
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 lumbar region without myelopathy or radiculopathy    6. Cervical spondylosis without myelopathy    7. DDD (degenerative disc disease), cervical    8. 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 Physical deconditioning        PLAN/MEDICAL DECISION MAKING:  Ms. Kiesha Dempsey, 69 y.o. female presents with a longstanding history of chronic intractable pain, which began without incident. Kiesha Dempsey reports reoccurrence of lower back pain that radiates now down to both legs. She also has a long history of neck pain and also endorses worsening neck pain that radiates to her shoulders and down her left arm. She underwent consultation with Dr. Amos in August 2023 regarding her neck pain and upper extremity symptoms as well as her lower back pain and left lower extremity pain. She was seen again in January 2024 when she was having lower back pain and right leg pain. She participated in physical therapy from January through March 2024 with improvement in her symptoms. She now presents with lower back pain radiating to both lower extremities. MRI of the lumbar spine on 07/21/2023 revealed leftward rotatory scoliosis of the lumbar spine. Grade 1 retrolisthesis of L1 on L2, L2 on L3, L4 on L5. Grade 1 anterolisthesis of L5 on S1. Diffuse disc desiccation. Multilevel facet  arthropathy with stenosis at L3-L4, L4-L5 and L5-S1. At L3-L4: Disc osteophyte complex formation. Facet hypertrophy. Severe left canal and left lateral recess stenosis with possible impingement of the exiting left L3 nerve root. Moderate to severe right NF stenosis. Complex signal along the descending left L4 nerve root. At L4-L5: Disc osteophyte complex formation. Facet hypertrophy. Severe left and moderate right NF stenosis. At L5-S1: Disc osteophyte complex formation. Facet hypertrophy. Grade 1 anterior listhesis of L5 on S1. Synovial cyst on the left causing severe left foraminal stenosis. Lumbar spine x-rays full views with flexion-extension on 12/09/2024. Levoscoliosis of 7 degrees. Multilevel degenerative changes. Loss of the disc spaces throughout the lumbar spine. Facet arthropathy. Anterolisthesis of L5 on S1 stable during flexion and extension. MRI of the cervical spine wo contrast on 12/19/2024 revealed multilevel disc osteophyte complexes and facet arthropathy. Moderate to severe spinal canal stenosis and moderate to severe neural foraminal stenosis at C3-C4, and worse at C4-C5. Cervical spine x-rays with flexion-extension on 12/09/2024 revealed facet arthropathy. Slight retrolisthesis of C3 on C4. No evidence of instability during flexion and extension. DEXA 02/17/2025 Osteopenia of the left femoral neck. Kiesha Dempsey underwent neurosurgical consultation with JARRETT Ye on 12/09/2024, and was found not to be a surgical candidate. Kiesha Dempsey would like to continue with conservative treatment as long as possible.  Kiesha Dempsey will return for follow up with NSA after pain management and a new course of physical therapy. Kiesha Dempsey has failed to obtain pain relief with conservative measures for more than 2 years including oral analgesics, topical analgesics, ice, heat, TENs, physical therapy (new round, ongoing, last visit yesterday), physical therapist directed home exercise  program HEP (ongoing, daily for 20 minutes for the past 12 months), therapeutic massage, acupuncture, independent exercise program (ongoing), to name a few.  Pain has progressed in intensity over the past years. A comprehensive evaluation including history and physical exam along with pertinent physiologic and functional assessment was performed. Patient presents with intractable pain due to the diagnoses listed above. Patient has failed to respond to conservative modalities, as referenced under HPI. I have documented the impact of patient's moderate-to-severe pain contributing to significant impairment in daily activities, ADLs, and a negative impact on the patient's quality of life, as reflected on Global Pain Scale 25/100;  Neck pain disability index questionnaire 18/50; The [place] Back Pain Disability Scale 29/100; Tinetti Gait & Balance Assessment Tool  (low risk for falls). I have reviewed pertinent supporting diagnostic studies of patient's chronic pain condition as well as all available pertinent medical records to patient's chronic pain condition including previous therapies, as referenced above.  PHQ-2 Depression Screening 0; Pain Self-Efficacy Questionnaire (PSEQ) 32/60. I had a lengthy conversation with Ms. Kiesha Dempsey regarding her chronic pain condition and potential therapeutic options including risks, benefits, alternative therapies, to name a few. We have discussed using a stepwise approach starting with the least intense level of care as determined by the extent required to diagnose and or treat a patient's condition. The proposed treatments are consistent with the patient's medical condition and known to be safe and effective by current guidelines and the standard of care. The duration and frequency proposed are considered appropriate for the service in accordance with accepted standards of medical practice for the diagnosis and treatment of the patient's condition and intended to improve the  patient's level of function. These services will be furnished in a setting appropriate to the patient's medical needs and condition. Therefore, I have proposed the following plan:    1. Interventional pain management measures: Patient will need to stop apixaban (Eliquis) at least 72 hours between last dose and procedure (will need clearance) in order to proceed with diagnostic and therapeutic bilateral L3-L4 transforaminal epidural steroid injections using the lowest effective dose of steroids, under C-arm fluoroscopic guidance, with the use of contrast dye to confirm appropriate needle placement and spread of contrast dye. We may repeat therapeutic bilateral L3-L4 transforaminal epidural steroid injections depending on patient's outcome and following current guidelines. Epidurals will be limited to a maximum of 4 sessions per spinal region in a rolling twelve (12) month period. Continuation of epidural steroid injections over 12 months would only be considered under the following provisions;  Patient is a high-risk surgical candidate, or the patient does not desire surgery, or recurrence of pain in the same location relieved with ESIs for at least three months and epidural provides at least 50% sustained improvement of pain and/or 50% objective improvement in function (using same scale as baseline)  Pain is severe enough to cause a significant degree of functional disability or vocational disability  The primary care provider will be notified regarding continuation of procedures and repeat steroid use   Patient would like to avoid surgery. We have discussed other options for treatment of her chronic pain such as regenerative therapies, MILD, Vertiflex, PNS Sprint, Nalu, SCS  For Treatment of Chronic Cervicalgia:  we have briefly discussed prospects of a first set of diagnostic bilateral cervical medial branch blocks at C3, C4, C5; for bilateral cervical facet joints at C3-C4, and C4-C5, to clarify the origin of  chronic refractory mechanical/axial cervicalgia. If the patient experiences 80% or more pain relief along with significant functional improvement and increase in the range of motion of the cervical spine, then, the patient will be scheduled for a second set of diagnostic bilateral cervical medial branch blocks, to then, proceed with bilateral cervical medial branch rhizotomies. If the patient experiences 50% or more pain relief and functional improvement for at least six months, we could repeat the procedure. If more than 2 years elapse since last RFTC, then, the patient will be required to undergo diagnostic blocks prior to repeating RFTC. Other options, Dx Tx bilateral MOOSE blocks  Patient will follow-up with Dr. Amos thereafter.     2. Diagnostic studies: None indicated at this time    3. Pharmacological measures: Reviewed and discussed;   A. Patient takes Rimegepant Sulfate (Nurtec) 75 mg PRN, OTC  B. Trial with Rheumate one tablet once daily  C. Start pyridoxine 100 mg one tablet by mouth daily take for 30 days, #30, no refills  D. Start alpha lipoid acid 0050-1691 mg per day divided into 3 doses  E. Trial with prilocaine 2%, lidocaine 10%, imipramine 3%, capsaicin 0.001%, and mannitol 20%  cream, apply 1 to 2 grams of cream to the affected areas every 4 to 6 hours as needed  F.  We have discussed potential trials with nortriptyline Vs amitriptyline Vs duloxetine     4. Long-term rehabilitation efforts:  A. The patient does not have a history of falls. I performed a risk assessment for falls using the Tinetti gait & balance assessment tool (scored low risk for falls).   B. Continue a comprehensive physical therapy program   C. Contrast therapy: Apply ice-packs for 15-20 minutes, followed by heating pads for 15-20 minutes to affected area   D. Continue a low impact exercise program such as water therapy, swimming, yoga  E. Referral to Dr. Brandon Hu in consultation for mouth appliance device for sleep apnea  F.  Prior to SCS: Referral to Dr. Nj Mckeon for psychological clearance for peripheral nerve stimulation, spinal cord stimulation  G. Kiesha Dempsey  reports that she has never smoked. She has never been exposed to tobacco smoke. She has never used smokeless tobacco.     5. The patient has been instructed to contact my office with any questions or difficulties. The patient understands the plan and agrees to proceed accordingly.    The patient has a documented plan of care to address chronic pain. Kiesha Dempsey reports a pain score of 3/10.  Given her pain assessment as noted, treatment options were discussed and the following options were decided upon as a follow-up plan to address the patient's pain: continuation of current treatment plan for pain, educational materials on pain management, home exercises and therapy, prescription for non-opiod analgesics, referral to Physical Therapy, referral to specialist for assistance in pain treatment guidance, steroid injections, use of non-medical modalities (ice, heat, stretching and/or behavior modifications), and interventional pain management measures .             Pain Management Panel           No data to display                 EUGENIE query complete. EUGENIE reviewed by Jan Manning MD.         No orders of the defined types were placed in this encounter.     Total Time Spent: 92 minutes    Please note that portions of this note were completed with a voice recognition program.   Any copied data in any portion of my note from previous notes included in the HPI, PE, MDM and/or assessment and plan has been reviewed by myself and accurate as of this date.   The 21st Century Cures Act makes medical notes like this available to patients in the interest of transparency. This is a medical document intended as peer to peer communication. It is written in medical language and may contain abbreviations or verbiage that are unfamiliar. It may appear blunt or direct.  Medical documents are intended to carry relevant information, facts as evident, and the clinical opinion of the practitioner.     Jan Manning MD    Patient Care Team:  Tyler Rucker MD as PCP - General  Hernan Holland PA as Physician Assistant (Cardiology)     No orders of the defined types were placed in this encounter.        Future Appointments   Date Time Provider Department Center   2/20/2025  1:00 PM Hernan Holland PA [facility]   3/7/2025 10:00 AM [facility] MRI 2 [facility]   3/7/2025  2:00 PM Lesly Johnston PA-C [facility]   1/28/2026 10:00 AM Bruce Rodriguez MD [facility]

## 2025-02-20 ENCOUNTER — OFFICE VISIT (OUTPATIENT)
Dept: PAIN MEDICINE | Facility: CLINIC | Age: 70
End: 2025-02-20
Payer: MEDICARE

## 2025-02-20 ENCOUNTER — OFFICE VISIT (OUTPATIENT)
Dept: NEUROSURGERY | Facility: CLINIC | Age: 70
End: 2025-02-20
Payer: MEDICARE

## 2025-02-20 ENCOUNTER — OFFICE VISIT (OUTPATIENT)
Dept: CARDIOLOGY | Facility: CLINIC | Age: 70
End: 2025-02-20
Payer: MEDICARE

## 2025-02-20 VITALS
BODY MASS INDEX: 21.83 KG/M2 | HEART RATE: 68 BPM | OXYGEN SATURATION: 99 % | HEIGHT: 63 IN | WEIGHT: 123.2 LBS | SYSTOLIC BLOOD PRESSURE: 132 MMHG | DIASTOLIC BLOOD PRESSURE: 78 MMHG

## 2025-02-20 VITALS
DIASTOLIC BLOOD PRESSURE: 72 MMHG | SYSTOLIC BLOOD PRESSURE: 128 MMHG | WEIGHT: 122.4 LBS | TEMPERATURE: 97.1 F | HEIGHT: 63 IN | BODY MASS INDEX: 21.69 KG/M2

## 2025-02-20 VITALS — HEIGHT: 63 IN | WEIGHT: 123 LBS | BODY MASS INDEX: 21.79 KG/M2

## 2025-02-20 DIAGNOSIS — M50.30 DDD (DEGENERATIVE DISC DISEASE), CERVICAL: ICD-10-CM

## 2025-02-20 DIAGNOSIS — R00.2 PALPITATIONS: ICD-10-CM

## 2025-02-20 DIAGNOSIS — M47.812 CERVICAL SPONDYLOSIS WITHOUT MYELOPATHY: ICD-10-CM

## 2025-02-20 DIAGNOSIS — M99.41 CONNECTIVE TISSUE STENOSIS OF NEURAL CANAL OF CERVICAL REGION: ICD-10-CM

## 2025-02-20 DIAGNOSIS — M48.062 LUMBAR STENOSIS WITH NEUROGENIC CLAUDICATION: ICD-10-CM

## 2025-02-20 DIAGNOSIS — M47.816 SPONDYLOSIS OF LUMBAR REGION WITHOUT MYELOPATHY OR RADICULOPATHY: ICD-10-CM

## 2025-02-20 DIAGNOSIS — R53.81 PHYSICAL DECONDITIONING: ICD-10-CM

## 2025-02-20 DIAGNOSIS — M71.38 SYNOVIAL CYST OF LUMBAR FACET JOINT: ICD-10-CM

## 2025-02-20 DIAGNOSIS — M51.370 DEGENERATION OF INTERVERTEBRAL DISC OF LUMBOSACRAL REGION WITH DISCOGENIC BACK PAIN: ICD-10-CM

## 2025-02-20 DIAGNOSIS — I48.0 PAROXYSMAL ATRIAL FIBRILLATION: Primary | ICD-10-CM

## 2025-02-20 DIAGNOSIS — I49.1 PREMATURE ATRIAL CONTRACTIONS: ICD-10-CM

## 2025-02-20 DIAGNOSIS — M54.16 LUMBAR RADICULOPATHY: Primary | ICD-10-CM

## 2025-02-20 DIAGNOSIS — M43.16 SPONDYLOLISTHESIS OF LUMBAR REGION: ICD-10-CM

## 2025-02-20 PROCEDURE — 93000 ELECTROCARDIOGRAM COMPLETE: CPT | Performed by: PHYSICIAN ASSISTANT

## 2025-02-20 PROCEDURE — 99214 OFFICE O/P EST MOD 30 MIN: CPT | Performed by: PHYSICIAN ASSISTANT

## 2025-02-20 RX ORDER — MULTIVITAMIN WITH IRON
100 TABLET ORAL DAILY
Qty: 90 TABLET | Refills: 1 | Status: SHIPPED | OUTPATIENT
Start: 2025-02-20

## 2025-02-20 RX ORDER — MV-MIN NO.113/IRON/FOLIC ACID 4.5 MG-2
400 CAPSULE ORAL 3 TIMES DAILY
Qty: 180 CAPSULE | Refills: 5 | Status: SHIPPED | OUTPATIENT
Start: 2025-02-20

## 2025-02-20 RX ORDER — APIXABAN 5 MG/1
1 TABLET, FILM COATED ORAL EVERY 12 HOURS SCHEDULED
COMMUNITY
Start: 2025-02-11

## 2025-02-20 RX ORDER — ME-TETRAHYDROFOLATE/B12/HRB236 1-1-500 MG
1 CAPSULE ORAL DAILY
Qty: 90 CAPSULE | Refills: 1 | Status: SHIPPED | OUTPATIENT
Start: 2025-02-20

## 2025-02-20 RX ORDER — METOPROLOL TARTRATE 25 MG/1
12.5 TABLET, FILM COATED ORAL 2 TIMES DAILY
Qty: 60 TABLET | Refills: 11 | Status: SHIPPED | OUTPATIENT
Start: 2025-02-20
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
## 2025-02-20 NOTE — PROGRESS NOTES
[facility]   OFFICE NOTE      Kiesha Dempsey  1955  PCP: Tyler Rucker MD    SUBJECTIVE:   Kiesha Dempsey is a 69 y.o. female seen for a follow up visit regarding the following:     CC: Atrial fibrillation    HPI:   69-year-old female presents today for follow-up regarding atrial fibrillation.  She recently had a breakthrough event that occurred January 25, 2025.  She was admitted to [facility].  She states that she had attempted cardioversion but maintaining A-fib.  She was then placed on IV medication, Cardizem but still remained in A-fib she was minimally symptomatic so therefore she was discharged home she states when she got home she converted to sinus rhythm.  She also reports prior to this she had an episode of what she thought was her typical confusional migraine headaches.  Although her neurologist felt an MRI should be completed to rule out possibility of a TIA.  She now remains on Eliquis therapy.  She denies chest pain or chest tightness worsening shortness of breath.  She denies any syncope events.  She is followed with Sunshine Griffin regarding some radiculopathy in her legs.  He states she has been maintaining sinus rhythm since this event occurred back in January.    Cardiac PMH: (Old records have been reviewed and summarized below)    Paroxysmal Atrial Fibrillation  CHADSVasc = 2 on Eliquis  24 Hour Holter 3/2018: no atrial fibrillation.   Diagnosed at PCP by EKG with symptoms of palpitations 11/15/18  Treated with metoprolol - caused low BP and low HR  Flecainide started 12/2018 - intolerant due to HA  Echocardiogram 2/15/18: EF 60%, mild TR  Stress Test 4/9/18: EF 69%, no ischemia. Low risk study.   Admit to [facility] ER for Atypical chest pain, Negative markers. Normal EKG Sinus Bradycardia.  PVA with cryoablation 10/31/19  Echocardiogram 2/12/2020: EF 58%, no significant structural or functional valvular disease  Stress Echocardiogram  "2/24/2021: EF 58%, no EKG evidence of ischemia, Normal stress echo with no significant echocardiographic evidence for myocardial ischemia.  Event Monitor 11/3-12/4/2021: No atrial fibrillation, occasional PACs and paroxysmal atrial tachycardia  Event monitor 6/2024 3 week No AF   Recurrent A-fib admission to [facility] attempted cardioversion failed.  Patient placed on IV medications, diltiazem.  She was discharged in A-fib but she states she spontaneously converted when she returned home.  January 2025  CP             A. Heart cath in 7/2024 Dr Lan. Mild atherosclerotic disease             Frequent UTIs  Migraines  Colon polyps  Chronic nausea  Surgical History  Hyterectomy  Total hip arthroplasty  Vein surgery    Past Medical History, Past Surgical History, Family history, Social History, and Medications were all reviewed with the patient today and updated as necessary.       Current Outpatient Medications:     Eliquis 5 MG tablet tablet, Take 1 tablet by mouth Every 12 (Twelve) Hours., Disp: , Rfl:     Eptinezumab-jjmr (Vyepti) 100 MG/ML solution solution, Infuse 1 mL into a venous catheter Every 3 (Three) Months., Disp: , Rfl:     pantoprazole (PROTONIX) 40 MG EC tablet, Take 1 tablet by mouth As Needed., Disp: , Rfl:     Rimegepant Sulfate (Nurtec) 75 MG tablet dispersible tablet, Take  by mouth As Needed., Disp: , Rfl:       Allergies   Allergen Reactions    Bactrim [Sulfamethoxazole-Trimethoprim] Hives    Tramadol Shortness Of Breath, Nausea Only and Other (See Comments)     CHEST PAIN    Flecainide Other (See Comments)     Headaches      Prednisone Palpitations and GI Intolerance     \"Can't remember exact reaction; just got really sick.   Advised to not take it again\"  \"I think my heart skipped beats\"         PHYSICAL EXAM:    /78 (BP Location: Right arm, Patient Position: Sitting, Cuff Size: Adult)   Pulse 68   Ht 160 cm (62.99\")   Wt 55.9 kg (123 lb 3.2 oz)   SpO2 99%   BMI " 21.83 kg/m²        Wt Readings from Last 5 Encounters:   02/20/25 55.9 kg (123 lb 3.2 oz)   02/20/25 55.5 kg (122 lb 6.4 oz)   02/20/25 55.8 kg (123 lb)   01/08/25 55 kg (121 lb 3.2 oz)   12/09/24 55.8 kg (123 lb)       BP Readings from Last 5 Encounters:   02/20/25 132/78   02/20/25 128/72   01/08/25 109/58   12/09/24 104/68   04/10/24 114/72       General appearance - Alert, well appearing, and in no distress   Mental status - Affect appropriate to mood.  Eyes - Sclerae anicteric,  ENMT - Hearing grossly normal bilaterally, Dental hygiene good.  Neck - Carotids upstroke normal bilaterally, no bruits, no JVD.  Resp - Clear to auscultation, no wheezes, rales or rhonchi, symmetric air entry.  Heart - Normal rate, regular rhythm, normal S1, S2, no murmurs, rubs, clicks or gallops.  GI - Soft, nontender, nondistended, no masses or organomegaly.  Neurological - Grossly intact - normal speech, no focal findings  Musculoskeletal - No joint tenderness, deformity or swelling, no muscular tenderness noted.  Extremities - Peripheral pulses normal, no pedal edema, no clubbing or cyanosis.  Skin - Normal coloration and turgor.  Psych -  oriented to person, place, and time.    Medical problems and test results were reviewed with the patient today.     No results found for this or any previous visit (from the past 4 weeks).      EKG: (EKG has been independently visualized by me and summarized below)    ECG 12 Lead    Date/Time: 2/20/2025 1:52 PM  Performed by: Hernan Holland PA    Authorized by: Hernan Holland PA  Comparison: compared with previous ECG from 1/30/2025  Similar to previous ECG  Rhythm: sinus rhythm  Rate: bradycardic  Conduction: conduction normal  QRS axis: normal    Clinical impression: normal ECG             ASSESSMENT   1. PAF:  - s/p cryo ablation 10/2019   - CHADSvasc = 1 (age),   -Recurrent AFib, ER visit [place] 1/26/2024. Apparently failed ECV currently sinus bradycardia.  Early recommend stay  on Eliquis therapy 5 mg twice daily due to recent breakthrough episodes of A-fib.     2. PACs/PVCs:   -rarely seen on monitor see #1  - monitor for now, overall not limiting to daily activity     3. Marginal SBP, Hydration fluids    4. Nonobstructive LHC 7/2024.  Dr. Lan , Echo 2021 Normal EF    5.  Migraine headaches atypical follow-up MRI with neurology rule out TIA/CVA.      PLAN  Continue Eliquis 5 mg twice daily due to recent breakthrough episode of A-fib.  Will try low-dose Lopressor 12.5 twice daily she may not tolerate this due to bradycardia.  She reports also she is intolerant to flecainide due to headaches she does not wish to pursue antiarrhythmic medications due to having intolerance to multiple medicine in the past.  We discussed possibility of redo ablation procedure if she has recurrent A-fib she states she would consider this.  Follow-up with neurology regarding recent atypical migraines, MRI of the head.  Return follow-up with Ester Mayer PA-C in 6 months and Dr. Rodriguez as scheduled January 2026.            2/20/2025  13:12 EST  Electronically signed by REINA Salazar, 02/20/25, 11:48 AM EST.

## 2025-02-26 ENCOUNTER — OFFICE VISIT (OUTPATIENT)
Dept: URGENT CARE | Facility: URGENT CARE | Age: 70
End: 2025-02-26
Payer: COMMERCIAL

## 2025-02-26 VITALS
HEART RATE: 95 BPM | BODY MASS INDEX: 22.4 KG/M2 | DIASTOLIC BLOOD PRESSURE: 77 MMHG | SYSTOLIC BLOOD PRESSURE: 133 MMHG | OXYGEN SATURATION: 97 % | TEMPERATURE: 97.6 F | RESPIRATION RATE: 16 BRPM | WEIGHT: 134.6 LBS

## 2025-02-26 DIAGNOSIS — M79.10 MYALGIA: ICD-10-CM

## 2025-02-26 DIAGNOSIS — J06.9 VIRAL URI WITH COUGH: ICD-10-CM

## 2025-02-26 DIAGNOSIS — J11.1 INFLUENZA-LIKE ILLNESS: Primary | ICD-10-CM

## 2025-02-26 LAB
FLUAV AG SPEC QL IA: NEGATIVE
FLUBV AG SPEC QL IA: NEGATIVE

## 2025-02-26 PROCEDURE — 3075F SYST BP GE 130 - 139MM HG: CPT | Performed by: INTERNAL MEDICINE

## 2025-02-26 PROCEDURE — 87635 SARS-COV-2 COVID-19 AMP PRB: CPT | Performed by: INTERNAL MEDICINE

## 2025-02-26 PROCEDURE — 99213 OFFICE O/P EST LOW 20 MIN: CPT | Performed by: INTERNAL MEDICINE

## 2025-02-26 PROCEDURE — 87804 INFLUENZA ASSAY W/OPTIC: CPT | Performed by: INTERNAL MEDICINE

## 2025-02-26 PROCEDURE — 3078F DIAST BP <80 MM HG: CPT | Performed by: INTERNAL MEDICINE

## 2025-02-26 RX ORDER — OSELTAMIVIR PHOSPHATE 75 MG/1
75 CAPSULE ORAL 2 TIMES DAILY
Qty: 10 CAPSULE | Refills: 0 | Status: SHIPPED | OUTPATIENT
Start: 2025-02-26 | End: 2025-03-03

## 2025-02-26 ASSESSMENT — ENCOUNTER SYMPTOMS
HEADACHES: 1
WHEEZING: 0
MYALGIAS: 1
SHORTNESS OF BREATH: 0
COUGH: 1

## 2025-02-26 NOTE — PROGRESS NOTES
ASSESSMENT AND PLAN:      ICD-10-CM    1. Influenza-like illness  J11.1 oseltamivir (TAMIFLU) 75 MG capsule      2. Viral URI with cough  J06.9 Influenza A & B Antigen - Clinic Collect     COVID-19 Virus (Coronavirus) by PCR Nose      3. Myalgia  M79.10 Influenza A & B Antigen - Clinic Collect     COVID-19 Virus (Coronavirus) by PCR Nose        PLAN:  URI Adult:  Fluids, Rest, OTC cough suppressant/expectorant, Saline gargles, and tamiflu  Results reviewed for influenza   Covid pending.    Patient Instructions     treatment for presumed influenza  Combined decision making to treat with tamiflu    Results will be on my-chart  No follow-ups on file.    Reviewed negative influenza results on [facility] and sent patient message    Mariam Ortiz MD  [facility]    Subjective     Maryann Atkinson is a 69 year old who presents for Patient presents with:  Urgent Care: Pt presents with upper respiratory issues; cough with phlegm onset yesterday.    an established patient of [facility].    URI Adult    Onset of symptoms was 2 day(s) ago.    Current and Associated symptoms: postnasal drip, coughing, body aches  Treatment measures tried include OTC Cough med.  Predisposing factors include ill contact: hospital.       was in hospital for 2 weeks, aortic tear repair  Has fluid in lungs - may need to be drained  Would like to be on antiviral    Review of Systems   Constitutional:         Felt hot & cold   HENT:  Positive for congestion and postnasal drip.    Respiratory:  Positive for cough. Negative for shortness of breath and wheezing.    Musculoskeletal:  Positive for myalgias (back).   Neurological:  Positive for headaches (little).           Objective    /77   Pulse 95   Temp 97.6  F (36.4  C) (Temporal)   Resp 16   Wt 61.1 kg (134 lb 9.6 oz)   LMP  (LMP Unknown)   SpO2 97%   BMI 22.40 kg/m    Physical Exam  Vitals reviewed.   Constitutional:       Appearance: Normal appearance. She  is not ill-appearing.   HENT:      Right Ear: Tympanic membrane normal.      Left Ear: Tympanic membrane normal.      Nose: Nose normal.      Mouth/Throat:      Mouth: Mucous membranes are moist.      Pharynx: Oropharynx is clear.   Cardiovascular:      Rate and Rhythm: Normal rate and regular rhythm.      Pulses: Normal pulses.      Heart sounds: Normal heart sounds.   Pulmonary:      Effort: Pulmonary effort is normal.      Breath sounds: Normal breath sounds.   Neurological:      Mental Status: She is alert.   Psychiatric:      Comments: tearful            Results for orders placed or performed in visit on 02/26/25 (from the past 24 hours)   Influenza A & B Antigen - Clinic Collect    Specimen: Nose; Swab   Result Value Ref Range    Influenza A antigen Negative Negative    Influenza B antigen Negative Negative    Narrative    Test results must be correlated with clinical data. If necessary, results should be confirmed by a molecular assay or viral culture.

## 2025-02-26 NOTE — PATIENT INSTRUCTIONS
treatment for presumed influenza  Combined decision making to treat with tamiflu    Results will be on my-chart

## 2025-02-27 LAB — SARS-COV-2 RNA RESP QL NAA+PROBE: NEGATIVE

## 2025-03-04 ENCOUNTER — PATIENT OUTREACH (OUTPATIENT)
Dept: CARE COORDINATION | Facility: CLINIC | Age: 70
End: 2025-03-04
Payer: COMMERCIAL

## 2025-03-07 ENCOUNTER — OFFICE VISIT (OUTPATIENT)
Dept: NEUROSURGERY | Facility: CLINIC | Age: 70
End: 2025-03-07
Payer: MEDICARE

## 2025-03-07 ENCOUNTER — HOSPITAL ENCOUNTER (OUTPATIENT)
Dept: MRI IMAGING | Facility: HOSPITAL | Age: 70
Discharge: HOME | End: 2025-03-07
Payer: MEDICARE

## 2025-03-07 VITALS — WEIGHT: 123.6 LBS | HEIGHT: 63 IN | TEMPERATURE: 96.9 F | BODY MASS INDEX: 21.9 KG/M2

## 2025-03-07 DIAGNOSIS — M47.816 FACET ARTHROPATHY, LUMBAR: Primary | ICD-10-CM

## 2025-03-07 DIAGNOSIS — M51.362 DEGENERATION OF INTERVERTEBRAL DISC OF LUMBAR REGION WITH DISCOGENIC BACK PAIN AND LOWER EXTREMITY PAIN: ICD-10-CM

## 2025-03-07 DIAGNOSIS — M54.16 LUMBAR RADICULOPATHY: ICD-10-CM

## 2025-03-07 PROCEDURE — A9577 INJ MULTIHANCE: HCPCS | Performed by: PHYSICIAN ASSISTANT

## 2025-03-07 PROCEDURE — 25510000002 GADOBENATE DIMEGLUMINE 529 MG/ML SOLUTION: Performed by: PHYSICIAN ASSISTANT

## 2025-03-07 PROCEDURE — 99214 OFFICE O/P EST MOD 30 MIN: CPT | Performed by: PHYSICIAN ASSISTANT

## 2025-03-07 PROCEDURE — 72158 MRI LUMBAR SPINE W/O & W/DYE: CPT

## 2025-03-07 RX ADMIN — GADOBENATE DIMEGLUMINE 10 ML: 529 INJECTION, SOLUTION INTRAVENOUS at 10:40
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
## 2025-03-13 ENCOUNTER — OFFICE VISIT (OUTPATIENT)
Dept: INTERNAL MEDICINE | Facility: CLINIC | Age: 70
End: 2025-03-13
Payer: COMMERCIAL

## 2025-03-13 VITALS
WEIGHT: 133.9 LBS | SYSTOLIC BLOOD PRESSURE: 126 MMHG | RESPIRATION RATE: 14 BRPM | HEART RATE: 85 BPM | OXYGEN SATURATION: 99 % | HEIGHT: 65 IN | BODY MASS INDEX: 22.31 KG/M2 | DIASTOLIC BLOOD PRESSURE: 60 MMHG | TEMPERATURE: 97.6 F

## 2025-03-13 DIAGNOSIS — R63.4 WEIGHT LOSS: ICD-10-CM

## 2025-03-13 DIAGNOSIS — Z00.00 HEALTHCARE MAINTENANCE: ICD-10-CM

## 2025-03-13 DIAGNOSIS — M19.079 PRIMARY OSTEOARTHRITIS OF FOOT, UNSPECIFIED LATERALITY: ICD-10-CM

## 2025-03-13 DIAGNOSIS — F41.9 ANXIETY: ICD-10-CM

## 2025-03-13 DIAGNOSIS — Z78.0 POSTMENOPAUSAL STATUS: ICD-10-CM

## 2025-03-13 DIAGNOSIS — L30.9 ECZEMA, UNSPECIFIED TYPE: ICD-10-CM

## 2025-03-13 DIAGNOSIS — Z13.220 LIPID SCREENING: ICD-10-CM

## 2025-03-13 DIAGNOSIS — R23.2 HOT FLASHES: ICD-10-CM

## 2025-03-13 DIAGNOSIS — Z00.00 ENCOUNTER FOR MEDICARE ANNUAL WELLNESS EXAM: Primary | ICD-10-CM

## 2025-03-13 LAB
ERYTHROCYTE [DISTWIDTH] IN BLOOD BY AUTOMATED COUNT: 11.5 % (ref 10–15)
HCT VFR BLD AUTO: 39.8 % (ref 35–47)
HGB BLD-MCNC: 13.5 G/DL (ref 11.7–15.7)
MCH RBC QN AUTO: 31.1 PG (ref 26.5–33)
MCHC RBC AUTO-ENTMCNC: 33.9 G/DL (ref 31.5–36.5)
MCV RBC AUTO: 92 FL (ref 78–100)
PLATELET # BLD AUTO: 276 10E3/UL (ref 150–450)
RBC # BLD AUTO: 4.34 10E6/UL (ref 3.8–5.2)
WBC # BLD AUTO: 5 10E3/UL (ref 4–11)

## 2025-03-13 RX ORDER — CLOBETASOL PROPIONATE 0.5 MG/G
OINTMENT TOPICAL 2 TIMES DAILY PRN
Qty: 30 G | Refills: 5 | Status: SHIPPED | OUTPATIENT
Start: 2025-03-13

## 2025-03-13 RX ORDER — TRIAMCINOLONE ACETONIDE 1 MG/G
CREAM TOPICAL 2 TIMES DAILY PRN
Qty: 15 G | Refills: 3 | Status: SHIPPED | OUTPATIENT
Start: 2025-03-13

## 2025-03-13 SDOH — HEALTH STABILITY: PHYSICAL HEALTH: ON AVERAGE, HOW MANY DAYS PER WEEK DO YOU ENGAGE IN MODERATE TO STRENUOUS EXERCISE (LIKE A BRISK WALK)?: 6 DAYS

## 2025-03-13 ASSESSMENT — SOCIAL DETERMINANTS OF HEALTH (SDOH): HOW OFTEN DO YOU GET TOGETHER WITH FRIENDS OR RELATIVES?: TWICE A WEEK

## 2025-03-13 NOTE — PATIENT INSTRUCTIONS
Patient Education   Preventive Care Advice   This is general advice given by our system to help you stay healthy. However, your care team may have specific advice just for you. Please talk to your care team about your preventive care needs.  Nutrition  Eat 5 or more servings of fruits and vegetables each day.  Try wheat bread, brown rice and whole grain pasta (instead of white bread, rice, and pasta).  Get enough calcium and vitamin D. Check the label on foods and aim for 100% of the RDA (recommended daily allowance).  Lifestyle  Exercise at least 150 minutes each week  (30 minutes a day, 5 days a week).  Do muscle strengthening activities 2 days a week. These help control your weight and prevent disease.  No smoking.  Wear sunscreen to prevent skin cancer.  I would recommend seeing a dermatologist every 2 years for a total-body skin exam  Have a dental exam and cleaning every 6 months.  Annual eye exam  Yearly exams  See your health care team every year to talk about:  Any changes in your health.  Any medicines your care team has prescribed.  Preventive care, family planning, and ways to prevent chronic diseases.  Shots (vaccines)   COVID-19 shot: Recommended  Flu shot: Get a flu shot every year.  Tetanus shot: Get a tetanus shot every 10 years.  Pneumococcal vaccines are completed.  RSV vaccine is now being recommended beginning at age 75  Shingles shot (for age 50 and up).  Shingrix is recommended and can be scheduled at your pharmacy  General health tests  Diabetes screening: Annual  Cholesterol test: Annually  Bone density scan (DEXA): DEXA is recommended for osteoporosis screening and I have ordered.  I would try to get this done in the next 6 months  Hepatitis C: Get tested at least once in your life.  Cancer screening tests  Breast cancer scan (mammogram): Annual mammogram  Colon cancer screening: It is important to start screening for colon cancer at age 45.  Colonoscopy will be due in 2030  For  informational purposes only. Not to replace the advice of your health care provider. Copyright   2023 [facility]. All rights reserved. Clinically reviewed by the [facility]. Patientco [id number] - REV 01/24.  Learning About Stress  What is stress?     Stress is your body's response to a hard situation. Your body can have a physical, emotional, or mental response. Stress is a fact of life for most people, and it affects everyone differently. What causes stress for you may not be stressful for someone else.  A lot of things can cause stress. You may feel stress when you go on a job interview, take a test, or run a race. This kind of short-term stress is normal and even useful. It can help you if you need to work hard or react quickly. For example, stress can help you finish an important job on time.  Long-term stress is caused by ongoing stressful situations or events. Examples of long-term stress include long-term health problems, ongoing problems at work, or conflicts in your family. Long-term stress can harm your health.  How does stress affect your health?  When you are stressed, your body responds as though you are in danger. It makes hormones that speed up your heart, make you breathe faster, and give you a burst of energy. This is called the fight-or-flight stress response. If the stress is over quickly, your body goes back to normal and no harm is done.  But if stress happens too often or lasts too long, it can have bad effects. Long-term stress can make you more likely to get sick, and it can make symptoms of some diseases worse. If you tense up when you are stressed, you may develop neck, shoulder, or low back pain. Stress is linked to high blood pressure and heart disease.  Stress also harms your emotional health. It can make you beal, tense, or depressed. Your relationships may suffer, and you may not do well at work or school.  What can you do to manage stress?  You  can try these things to help manage stress:   Do something active. Exercise or activity can help reduce stress. Walking is a great way to get started. Even everyday activities such as housecleaning or yard work can help.  Try yoga or jessa chi. These techniques combine exercise and meditation. You may need some training at first to learn them.  Do something you enjoy. For example, listen to music or go to a movie. Practice your hobby or do volunteer work.  Meditate. This can help you relax, because you are not worrying about what happened before or what may happen in the future.  Do guided imagery. Imagine yourself in any setting that helps you feel calm. You can use online videos, books, or a teacher to guide you.  Do breathing exercises. For example:  From a standing position, bend forward from the waist with your knees slightly bent. Let your arms dangle close to the floor.  Breathe in slowly and deeply as you return to a standing position. Roll up slowly and lift your head last.  Hold your breath for just a few seconds in the standing position.  Breathe out slowly and bend forward from the waist.  Let your feelings out. Talk, laugh, cry, and express anger when you need to. Talking with supportive friends or family, a counselor, or a cammie leader about your feelings is a healthy way to relieve stress. Avoid discussing your feelings with people who make you feel worse.  Write. It may help to write about things that are bothering you. This helps you find out how much stress you feel and what is causing it. When you know this, you can find better ways to cope.  What can you do to prevent stress?  You might try some of these things to help prevent stress:  Manage your time. This helps you find time to do the things you want and need to do.  Get enough sleep. Your body recovers from the stresses of the day while you are sleeping.  Get support. Your family, friends, and community can make a difference in how you  "experience stress.  Limit your news feed. Avoid or limit time on social media or news that may make you feel stressed.  Do something active. Exercise or activity can help reduce stress. Walking is a great way to get started.  Where can you learn more?  Go to https://www.[employer].net/patiented  Enter N032 in the search box to learn more about \"Learning About Stress.\"  Current as of: October 24, 2023  Content Version: 14.3    2024 [facility].   Care instructions adapted under license by your healthcare professional. If you have questions about a medical condition or this instruction, always ask your healthcare professional. [facility] disclaims any warranty or liability for your use of this information.       "

## 2025-03-13 NOTE — PROGRESS NOTES
Preventive Care Visit  [facility]  Richar Rodas MD, Internal Medicine  Mar 13, 2025      Assessment & Plan     Encounter for Medicare annual wellness exam  Immunizations are reviewed and recommending Shingrix.  Declines COVID vaccination.  Discussed RSV.  Everything else is up-to-date.  She has a living will.  Non-smoker.  Uses alcohol minimally.  Regular exercise and good diet habits discussed.  Up to date with colonoscopies and this should be repeated in 2030.  Recommending annual mammogram for breast cancer screening.  DEXA is recommended for osteoporosis screening.  Dementia and depression screening completed.  Recommending annual eye exam.  Recommending seeing a dentist every 6 months.  Skin exam performed and recommending regular use of sunblock.  We discussed seeing a dermatologist every 1 to 2 years.  Hepatitis C antibody for screening was normal.  Will screen for diabetes with fasting glucose.  Checking fasting lipid profile.      Anxiety  She has been under high levels of stress the past several weeks after her  underwent surgery for repair of dissecting aneurysm.  She has been his caregiver.  He seems to be stabilizing which is helpful.  She has been sleeping poorly and has been using more of her lorazepam.  She has not been able to exercise which is usually helpful to relieve stress.  I am encouraging her to resume her walking and we discussed the risks of taking clonazepam on a daily basis.  She understands the risk of developing dependence.  Stress levels seem to be less as he is currently stable and she has been able to cut back on the dose of lorazepam that she has been taking and will hopefully be able to avoid taking on a daily basis going forward.    Eczema, unspecified type  Refilling clobetasol ointment and her triamcinolone cream  - clobetasol (TEMOVATE) 0.05 % external ointment; Apply topically 2 times daily as needed (eczema).  - triamcinolone (KENALOG)  0.1 % external cream; Apply topically 2 times daily as needed for irritation.    Hot flashes  He will still get hot flashes but they are manageable.  We discussed trying tofu which is said to help with symptoms.  She did see an OB/GYN regarding this and is not interested in taking hormone replacement and I would agree that this would carry some risk in her age group  - TSH with free T4 reflex; Future    Primary osteoarthritis of foot, unspecified laterality  Chronic pain in her foot related to arthritis.  She had cortisone injection that was helpful but it is wearing off rather quickly.  We discussed using Voltaren gel as needed  - diclofenac (VOLTAREN) 1 % topical gel; Apply 2 g topically 3 times daily as needed (Foot pain).    Weight loss  She is down 6 pounds from last year and has had a decreased appetite due to the stress that she has been under.  I am checking appropriate labs to make sure nothing else might be contributing.  She is up-to-date with her mammograms.  Up-to-date with colonoscopies.  - CBC with platelets; Future  - Comprehensive metabolic panel (BMP + Alb, Alk Phos, ALT, AST, Total. Bili, TP); Future  - TSH with free T4 reflex; Future    Postmenopausal status  She has yet to get a DEXA completed for osteoporosis screening and I am recommending getting that done in the next 6 months with order placed  - DEXA HIP/PELVIS/SPINE - Future; Future    Healthcare maintenance    - CBC with platelets; Future  - Comprehensive metabolic panel (BMP + Alb, Alk Phos, ALT, AST, Total. Bili, TP); Future  - Lipid Profile (Chol, Trig, HDL, LDL calc); Future  - UA Macroscopic with reflex to Microscopic and Culture - Lab Collect; Future  - TSH with free T4 reflex; Future    Lipid screening    - Lipid Profile (Chol, Trig, HDL, LDL calc); Future    The longitudinal plan of care for the diagnosis(es)/condition(s) as documented were addressed during this visit. Due to the added complexity in care, I will continue to  support Maryann in the subsequent management and with ongoing continuity of care.     Patient has been advised of split billing requirements and indicates understanding: Yes        Counseling  Appropriate preventive services were addressed with this patient via screening, questionnaire, or discussion as appropriate for fall prevention, nutrition, physical activity, Tobacco-use cessation, social engagement, weight loss and cognition.  Checklist reviewing preventive services available has been given to the patient.  Reviewed patient's diet, addressing concerns and/or questions.   She is at risk for psychosocial distress and has been provided with information to reduce risk.       See Patient Instructions    Subjective   Maryann is a 69 year old, presenting for the following: Here for annual wellness visit and follow-up medical problems including anxiety/stress, postmenopausal symptoms including hot flashes, arthritis involving foot, and other concerns.  See assessment and plan for details  Physical (AWV)        3/13/2025    10:06 AM   Additional Questions   Roomed by            Advance Care Planning  Patient does have a Health Care Directive: Patient states has Advance Directive and will bring in a copy to clinic.      3/13/2025   General Health   How would you rate your overall physical health? Good   Feel stress (tense, anxious, or unable to sleep) To some extent   (!) STRESS CONCERN      3/13/2025   Nutrition   Diet: Regular (no restrictions)         3/13/2025   Exercise   Days per week of moderate/strenous exercise 6 days         3/13/2025   Social Factors   Frequency of gathering with friends or relatives Twice a week   Worry food won't last until get money to buy more No   Food not last or not have enough money for food? No   Do you have housing? (Housing is defined as stable permanent housing and does not include staying ouside in a car, in a tent, in an abandoned building, in an overnight shelter, or  couch-surfing.) Yes   Are you worried about losing your housing? No   Lack of transportation? No   Unable to get utilities (heat,electricity)? No         3/13/2025   Fall Risk   Fallen 2 or more times in the past year? No    No   Trouble with walking or balance? No    No       Multiple values from one day are sorted in reverse-chronological order          3/13/2025   Activities of Daily Living- Home Safety   Needs help with the following daily activites None of the above   Safety concerns in the home None of the above         3/13/2025   Dental   Dentist two times every year? Yes         3/13/2025   Hearing Screening   Hearing concerns? None of the above         3/13/2025   Driving Risk Screening   Patient/family members have concerns about driving No         3/13/2025   General Alertness/Fatigue Screening   Have you been more tired than usual lately? No         3/13/2025   Urinary Incontinence Screening   Bothered by leaking urine in past 6 months No           Today's PHQ-2 Score:       3/13/2025    10:02 AM   PHQ-2 ( 1999 Pf[employer])   Q1: Little interest or pleasure in doing things 0   Q2: Feeling down, depressed or hopeless 0   PHQ-2 Score 0    Q1: Little interest or pleasure in doing things Not at all   Q2: Feeling down, depressed or hopeless Not at all   PHQ-2 Score 0       Patient-reported           3/13/2025   Substance Use   Alcohol more than 3/day or more than 7/wk No   Do you have a current opioid prescription? No   How severe/bad is pain from 1 to 10? 0/10 (No Pain)   Do you use any other substances recreationally? No     Social History     Tobacco Use    Smoking status: Never     Passive exposure: Never    Smokeless tobacco: Never   Vaping Use    Vaping status: Never Used   Substance Use Topics    Alcohol use: Not Currently     Comment: Alcoholic Drinks/day: rare    Drug use: Never           1/9/2025   LAST FHS-7 RESULTS   1st degree relative breast or ovarian cancer No   Any relative bilateral breast  cancer No   Any male have breast cancer No   Any ONE woman have BOTH breast AND ovarian cancer No   Any woman with breast cancer before 50yrs No   2 or more relatives with breast AND/OR ovarian cancer --   2 or more relatives with breast AND/OR bowel cancer No            ASCVD Risk   The ASCVD Risk score (Vianney GLASS, et al., 2019) failed to calculate for the following reasons:    The valid HDL cholesterol range is 20 to 100 mg/dL            Reviewed and updated as needed this visit by Provider                    Past Medical History:   Diagnosis Date    Acute pain of right knee 06/23/2016    Anxiety 08/18/2022    Family history of esophageal cancer 04/08/2021    SHAMA (generalized anxiety disorder) 12/13/2016    GERD without esophagitis     EGD with no esophagitis or stricture May 2021    Hot flashes 04/15/2022    Palpitations 07/13/2021    Holter monitor worn previously    Primary osteoarthritis of foot, unspecified laterality 03/13/2025    Screening for colon cancer     Normal colonoscopy July 2020    TMJ (temporomandibular joint syndrome) 10/03/2018     Past Surgical History:   Procedure Laterality Date    BIOPSY BREAST Left     TONSILLECTOMY      ZZC TOTAL ABDOM HYSTERECTOMY      Description: Total Abdominal Hysterectomy;  Recorded: 02/17/2011;  Comments: done 2001     Current providers sharing in care for this patient include:  Patient Care Team:  Richar Rodas MD as PCP - General  Richar Rodas MD as Assigned PCP    The following health maintenance items are reviewed in Epic and correct as of today:  Health Maintenance   Topic Date Due    DEXA  Never done    ZOSTER IMMUNIZATION (1 of 2) Never done    COVID-19 Vaccine (4 - 2024-25 season) 09/01/2024    ANNUAL REVIEW OF HM ORDERS  03/12/2025    MEDICARE ANNUAL WELLNESS VISIT  03/12/2025    FALL RISK ASSESSMENT  03/13/2026    MAMMO SCREENING  01/09/2027    GLUCOSE  03/12/2027    RSV VACCINE (1 - 1-dose 75+ series) 03/18/2028    LIPID  03/12/2029  "   ADVANCE CARE PLANNING  03/12/2029    COLORECTAL CANCER SCREENING  07/31/2030    DTAP/TDAP/TD IMMUNIZATION (3 - Td or Tdap) 05/20/2034    HEPATITIS C SCREENING  Completed    PHQ-2 (once per calendar year)  Completed    INFLUENZA VACCINE  Completed    Pneumococcal Vaccine: 50+ Years  Completed    HPV IMMUNIZATION  Aged Out    MENINGITIS IMMUNIZATION  Aged Out         Review of Systems  Constitutional, HEENT, cardiovascular, pulmonary, GI, , musculoskeletal, neuro, skin, endocrine and psych systems are negative, except as otherwise noted.     Objective    Exam  /60 (BP Location: Right arm, Patient Position: Sitting, Cuff Size: Adult Regular)   Pulse 85   Temp 97.6  F (36.4  C) (Oral)   Resp 14   Ht 1.654 m (5' 5.12\")   Wt 60.7 kg (133 lb 14.4 oz)   LMP  (LMP Unknown)   SpO2 99%   Breastfeeding No   BMI 22.20 kg/m     Estimated body mass index is 22.2 kg/m  as calculated from the following:    Height as of this encounter: 1.654 m (5' 5.12\").    Weight as of this encounter: 60.7 kg (133 lb 14.4 oz).    Physical Exam  EYES: Eyelids, conjunctiva, and sclera were normal. Pupils were normal. Cornea, iris, and lens were normal bilaterally.  HEAD, EARS, NOSE, MOUTH, AND THROAT: Head and face were normal. TMs and external auditory canals are normal.  Oropharynx normal  NECK: Neck appearance was normal. There were no neck masses and the thyroid was not enlarged and no nodules are felt.  No lymphadenopathy.  RESPIRATORY: Breathing pattern was normal and the chest moved symmetrically.   Lung sounds were normal and there were no rales or wheezes.  CARDIOVASCULAR: Heart rate and rhythm were normal.  S1 and S2 were normal and there were no extra sounds or murmurs. Peripheral pulses in arms and legs were normal.  There was no peripheral edema.  No carotid bruits.  GASTROINTESTINAL:  Bowel sounds were present.   Palpation detected no tenderness, mass, or enlarged organs.   MUSCULOSKELETAL: Skeletal configuration was " normal and muscle mass was normal for age. Joint appearance was overall normal.  LYMPHATIC: There were no enlarged nodes.  SKIN/HAIR/NAILS: Skin color was normal.  There were no concerning skin lesions.  NEUROLOGIC: The patient was alert and oriented to person, place, time, and circumstance. Speech was normal. Cranial nerves were normal. Motor strength was normal for age. The patient was normally coordinated.  Sensation intact.  PSYCHIATRIC:  Mood and affect were normal and the patient had normal recent and remote memory. The patient's judgment and insight were normal.  PHQ 2 score is 0        3/13/2025   Mini Cog   Clock Draw Score 2 Normal   3 Item Recall 3 objects recalled   Mini Cog Total Score 5              Signed Electronically by: Richar Rodas MD

## 2025-03-17 LAB
ALBUMIN SERPL BCG-MCNC: 4.4 G/DL (ref 3.5–5.2)
ALP SERPL-CCNC: 85 U/L (ref 40–150)
ALT SERPL W P-5'-P-CCNC: 23 U/L (ref 0–50)
ANION GAP SERPL CALCULATED.3IONS-SCNC: 11 MMOL/L (ref 7–15)
AST SERPL W P-5'-P-CCNC: 23 U/L (ref 0–45)
BILIRUB SERPL-MCNC: 0.6 MG/DL
BUN SERPL-MCNC: 15.4 MG/DL (ref 8–23)
CALCIUM SERPL-MCNC: 9.9 MG/DL (ref 8.8–10.4)
CHLORIDE SERPL-SCNC: 103 MMOL/L (ref 98–107)
CREAT SERPL-MCNC: 0.68 MG/DL (ref 0.51–0.95)
EGFRCR SERPLBLD CKD-EPI 2021: >90 ML/MIN/1.73M2
FASTING STATUS PATIENT QL REPORTED: YES
GLUCOSE SERPL-MCNC: 98 MG/DL (ref 70–99)
HCO3 SERPL-SCNC: 25 MMOL/L (ref 22–29)
POTASSIUM SERPL-SCNC: 3.9 MMOL/L (ref 3.4–5.3)
PROT SERPL-MCNC: 6.6 G/DL (ref 6.4–8.3)
SODIUM SERPL-SCNC: 139 MMOL/L (ref 135–145)

## 2025-08-13 DIAGNOSIS — F41.1 GAD (GENERALIZED ANXIETY DISORDER): ICD-10-CM

## 2025-08-14 RX ORDER — LORAZEPAM 0.5 MG/1
0.5 TABLET ORAL DAILY
Qty: 30 TABLET | Refills: 0 | Status: SHIPPED | OUTPATIENT
Start: 2025-08-14

## 2025-08-22 DIAGNOSIS — M43.16 SPONDYLOLISTHESIS OF LUMBAR REGION: ICD-10-CM

## 2025-08-22 DIAGNOSIS — M51.362 DEGENERATION OF INTERVERTEBRAL DISC OF LUMBAR REGION WITH DISCOGENIC BACK PAIN AND LOWER EXTREMITY PAIN: Primary | ICD-10-CM

## (undated) DEVICE — Device: Brand: SMARTABLATE

## (undated) DEVICE — STERILE (15.2 TAPERED TO 7.6 X 183CM) POLYETHYLENE ACCORDION-FOLDED COVER FOR USE WITH SIEMENS ACUNAV ULTRASOUND CATHETER FAMILY CONNECTOR: Brand: SWIFTLINK TRANSDUCER COVER

## (undated) DEVICE — Device: Brand: MEDEX

## (undated) DEVICE — DRSNG SURESITE123 4X4.8IN

## (undated) DEVICE — ST EXT IV SMARTSITE 2VLV SP M LL 5ML IV1

## (undated) DEVICE — SHEATH FLXCATH STEER 12FR

## (undated) DEVICE — GW DIAG .032

## (undated) DEVICE — KT VLV HEMO MAP ACC PLS LG/BORE MTL/INTRO W/TORQ/DEV

## (undated) DEVICE — CATH QUAD CRD 6F5MM

## (undated) DEVICE — PRESSURE MONITORING SET: Brand: TRUWAVE

## (undated) DEVICE — INTRO SHEATH 8F60CM

## (undated) DEVICE — Device

## (undated) DEVICE — CATH DIAG EXPO MPA1 6F .041IN 100CM

## (undated) DEVICE — CATH ABL ACHIEVE MP 3.3F20MM 165CM

## (undated) DEVICE — SOL NACL 0.9PCT 1000ML

## (undated) DEVICE — SYS TRNSEP ACC BRK ACROSS A/ 71CM

## (undated) DEVICE — CONTRL CONTRST CHMBRD W/TBG72IN REUS

## (undated) DEVICE — CABL CONN CATH EP UMB 48IN

## (undated) DEVICE — DOME MONITORING W BONDED STPCK BIOTRANS2

## (undated) DEVICE — ADULT, W/LG. BACK PAD, RADIOTRANSPARENT ELEMENT AND LEAD WIRE: Brand: DEFIBRILLATION ELECTRODES

## (undated) DEVICE — DECANT BG O JET

## (undated) DEVICE — Device: Brand: LASSO NAV

## (undated) DEVICE — KT MANIFLD EP

## (undated) DEVICE — Device: Brand: SOUNDSTAR

## (undated) DEVICE — LEX ELECTRO PHYSIOLOGY: Brand: MEDLINE INDUSTRIES, INC.

## (undated) DEVICE — Device: Brand: WEBSTER

## (undated) DEVICE — SET PRIMARY GRVTY 10DP MALE LL 104IN

## (undated) DEVICE — INTRO SHEATH ENGAGE W/50 GW .038 9F12

## (undated) DEVICE — CATH ABL ARCTIC FRNT ADV 10.5F3.5X28MM

## (undated) DEVICE — CATH SHEATH GUIDE SWARTZ 6FR

## (undated) DEVICE — CABL CONN CATH EP COAXL UMB 72IN

## (undated) DEVICE — Device: Brand: REFERENCE PATCH CARTO 3

## (undated) DEVICE — INTRO SHEATH ENGAGE W/50 GW .038 7F12

## (undated) DEVICE — INTRO SHEATH FAST/CATH LG/LUM 11F .038IN 12CM

## (undated) DEVICE — ST INF PRI SMRTSTE 20DRP 2VLV 24ML 117